# Patient Record
Sex: FEMALE | Race: WHITE | NOT HISPANIC OR LATINO | Employment: UNEMPLOYED | URBAN - METROPOLITAN AREA
[De-identification: names, ages, dates, MRNs, and addresses within clinical notes are randomized per-mention and may not be internally consistent; named-entity substitution may affect disease eponyms.]

---

## 2023-05-11 ENCOUNTER — PATIENT OUTREACH (OUTPATIENT)
Dept: PRIMARY CARE | Facility: CLINIC | Age: 58
End: 2023-05-11

## 2023-05-11 RX ORDER — CIPROFLOXACIN 750 MG/1
TABLET, FILM COATED ORAL 2 TIMES DAILY
COMMUNITY
Start: 2023-05-09 | End: 2023-07-11 | Stop reason: ALTCHOICE

## 2023-05-11 RX ORDER — SULFAMETHOXAZOLE AND TRIMETHOPRIM 800; 160 MG/1; MG/1
TABLET ORAL 2 TIMES DAILY
COMMUNITY
Start: 2023-05-22 | End: 2023-07-11 | Stop reason: ALTCHOICE

## 2023-05-11 RX ORDER — OXYCODONE HYDROCHLORIDE 5 MG/1
TABLET ORAL EVERY 6 HOURS PRN
COMMUNITY
Start: 2023-05-09 | End: 2023-05-25 | Stop reason: ALTCHOICE

## 2023-05-11 RX ORDER — VANCOMYCIN 1 G/200ML
1 INJECTION, SOLUTION INTRAVENOUS EVERY 12 HOURS
COMMUNITY
Start: 2023-05-09 | End: 2023-07-11 | Stop reason: ALTCHOICE

## 2023-05-11 NOTE — PROGRESS NOTES
Discharge Facility: [facility]  Discharge Diagnosis: LLE cellulitise/lymphedema  Admission Date: 5/4/23  Discharge Date:  5/9/23    PCP Appointment Date: TBD  Specialist Appointment Date:   Dr. Joaquín Vazquez - Infectious Disease - 02-Jun-2023 10:00    Hospital Encounter and Summary: Linked   See discharge assessment below for further details    Medications  Medications reviewed with patient/caregiver?: Yes (no new meds) (5/11/2023 10:28 AM)  Is the patient having any side effects they believe may be caused by any medication additions or changes?: No (5/11/2023 10:28 AM)  Does the patient have all medications ordered at discharge?: Yes (5/11/2023 10:28 AM)  Care Management Interventions: No intervention needed (5/11/2023 10:28 AM)  Is the patient taking all medications as directed (includes completed medication regime)?: Yes (5/11/2023 10:28 AM)  Care Management Interventions: Provided patient education (5/11/2023 10:28 AM)    Appointments  Does the patient have a primary care provider?: Yes (5/11/2023 10:28 AM)  Care Management Interventions: Advised patient to make appointment; Educated patient on importance of making appointment (5/11/2023 10:28 AM)  Has the patient kept scheduled appointments due by today?: Yes (5/11/2023 10:28 AM)    Self Management  What is the home health agency?: [facility] (5/11/2023 10:28 AM)  Has home health visited the patient within 72 hours of discharge?: Yes (5/11/2023 10:28 AM)  What Durable Medical Equipment (DME) was ordered?: n/a (5/11/2023 10:28 AM)    Patient Teaching  Does the patient have access to their discharge instructions?: Yes (5/11/2023 10:28 AM)  Care Management Interventions: Reviewed instructions with patient (5/11/2023 10:28 AM)  What is the patient's perception of their health status since discharge?: Improving (5/11/2023 10:28 AM)

## 2023-05-15 LAB
ANION GAP IN SER/PLAS: 14 MMOL/L (ref 10–20)
BASOPHILS (10*3/UL) IN BLOOD BY AUTOMATED COUNT: 0.05 X10E9/L (ref 0–0.1)
BASOPHILS/100 LEUKOCYTES IN BLOOD BY AUTOMATED COUNT: 0.6 % (ref 0–2)
CALCIUM (MG/DL) IN SER/PLAS: 9 MG/DL (ref 8.6–10.6)
CARBON DIOXIDE, TOTAL (MMOL/L) IN SER/PLAS: 27 MMOL/L (ref 21–32)
CHLORIDE (MMOL/L) IN SER/PLAS: 103 MMOL/L (ref 98–107)
CREATININE (MG/DL) IN SER/PLAS: 0.83 MG/DL (ref 0.5–1.05)
EOSINOPHILS (10*3/UL) IN BLOOD BY AUTOMATED COUNT: 0.48 X10E9/L (ref 0–0.7)
EOSINOPHILS/100 LEUKOCYTES IN BLOOD BY AUTOMATED COUNT: 5.6 % (ref 0–6)
ERYTHROCYTE DISTRIBUTION WIDTH (RATIO) BY AUTOMATED COUNT: 17.2 % (ref 11.5–14.5)
ERYTHROCYTE MEAN CORPUSCULAR HEMOGLOBIN CONCENTRATION (G/DL) BY AUTOMATED: 29.1 G/DL (ref 32–36)
ERYTHROCYTE MEAN CORPUSCULAR VOLUME (FL) BY AUTOMATED COUNT: 87 FL (ref 80–100)
ERYTHROCYTES (10*6/UL) IN BLOOD BY AUTOMATED COUNT: 4.2 X10E12/L (ref 4–5.2)
GFR FEMALE: 83 ML/MIN/1.73M2
GLUCOSE (MG/DL) IN SER/PLAS: 97 MG/DL (ref 74–99)
HEMATOCRIT (%) IN BLOOD BY AUTOMATED COUNT: 36.4 % (ref 36–46)
HEMOGLOBIN (G/DL) IN BLOOD: 10.6 G/DL (ref 12–16)
IMMATURE GRANULOCYTES/100 LEUKOCYTES IN BLOOD BY AUTOMATED COUNT: 0.7 % (ref 0–0.9)
LEUKOCYTES (10*3/UL) IN BLOOD BY AUTOMATED COUNT: 8.5 X10E9/L (ref 4.4–11.3)
LYMPHOCYTES (10*3/UL) IN BLOOD BY AUTOMATED COUNT: 1.8 X10E9/L (ref 1.2–4.8)
LYMPHOCYTES/100 LEUKOCYTES IN BLOOD BY AUTOMATED COUNT: 21.1 % (ref 13–44)
MONOCYTES (10*3/UL) IN BLOOD BY AUTOMATED COUNT: 0.62 X10E9/L (ref 0.1–1)
MONOCYTES/100 LEUKOCYTES IN BLOOD BY AUTOMATED COUNT: 7.3 % (ref 2–10)
NEUTROPHILS (10*3/UL) IN BLOOD BY AUTOMATED COUNT: 5.52 X10E9/L (ref 1.2–7.7)
NEUTROPHILS/100 LEUKOCYTES IN BLOOD BY AUTOMATED COUNT: 64.7 % (ref 40–80)
NRBC (PER 100 WBCS) BY AUTOMATED COUNT: 0 /100 WBC (ref 0–0)
PLATELETS (10*3/UL) IN BLOOD AUTOMATED COUNT: 350 X10E9/L (ref 150–450)
POTASSIUM (MMOL/L) IN SER/PLAS: 4.4 MMOL/L (ref 3.5–5.3)
SODIUM (MMOL/L) IN SER/PLAS: 140 MMOL/L (ref 136–145)
UREA NITROGEN (MG/DL) IN SER/PLAS: 10 MG/DL (ref 6–23)
VANCOMYCIN (UG/ML) IN SER/PLAS - TROUGH: 16.5 UG/ML (ref 5–20)

## 2023-05-22 ENCOUNTER — APPOINTMENT (OUTPATIENT)
Dept: PRIMARY CARE | Facility: CLINIC | Age: 58
End: 2023-05-22

## 2023-05-25 ENCOUNTER — OFFICE VISIT (OUTPATIENT)
Dept: PRIMARY CARE | Facility: CLINIC | Age: 58
End: 2023-05-25
Payer: MEDICARE

## 2023-05-25 ENCOUNTER — TELEPHONE (OUTPATIENT)
Dept: PRIMARY CARE | Facility: CLINIC | Age: 58
End: 2023-05-25

## 2023-05-25 VITALS
HEIGHT: 63 IN | BODY MASS INDEX: 51.91 KG/M2 | SYSTOLIC BLOOD PRESSURE: 146 MMHG | WEIGHT: 293 LBS | OXYGEN SATURATION: 96 % | TEMPERATURE: 97.7 F | DIASTOLIC BLOOD PRESSURE: 85 MMHG | HEART RATE: 88 BPM

## 2023-05-25 DIAGNOSIS — G62.9 NEUROPATHY: ICD-10-CM

## 2023-05-25 DIAGNOSIS — F41.9 ANXIETY: ICD-10-CM

## 2023-05-25 DIAGNOSIS — R53.81 MALAISE: ICD-10-CM

## 2023-05-25 DIAGNOSIS — D64.9 ANEMIA, UNSPECIFIED TYPE: Primary | ICD-10-CM

## 2023-05-25 PROCEDURE — 99495 TRANSJ CARE MGMT MOD F2F 14D: CPT | Performed by: FAMILY MEDICINE

## 2023-05-25 RX ORDER — ESCITALOPRAM OXALATE 10 MG/1
10 TABLET ORAL DAILY
Qty: 90 TABLET | Refills: 2 | Status: SHIPPED | OUTPATIENT
Start: 2023-05-25 | End: 2024-06-10

## 2023-05-25 RX ORDER — GABAPENTIN 300 MG/1
300 CAPSULE ORAL 3 TIMES DAILY
Qty: 90 CAPSULE | Refills: 5 | Status: SHIPPED | OUTPATIENT
Start: 2023-05-25 | End: 2024-01-25

## 2023-05-25 RX ORDER — ESCITALOPRAM OXALATE 10 MG/1
TABLET ORAL
COMMUNITY
Start: 2022-05-21 | End: 2023-05-25 | Stop reason: SDUPTHER

## 2023-05-25 RX ORDER — HYDROXYZINE HYDROCHLORIDE 25 MG/1
1 TABLET, FILM COATED ORAL 3 TIMES DAILY PRN
COMMUNITY
Start: 2022-05-21 | End: 2023-05-25 | Stop reason: SDUPTHER

## 2023-05-25 RX ORDER — CALCIUM CARBONATE 500(1250)
TABLET,CHEWABLE ORAL
COMMUNITY
Start: 2023-05-09 | End: 2023-10-09 | Stop reason: ENTERED-IN-ERROR

## 2023-05-25 RX ORDER — HYDROXYZINE HYDROCHLORIDE 25 MG/1
25 TABLET, FILM COATED ORAL 3 TIMES DAILY PRN
Qty: 270 TABLET | Refills: 1 | Status: SHIPPED | OUTPATIENT
Start: 2023-05-25

## 2023-05-25 RX ORDER — CEPHALEXIN 500 MG/1
CAPSULE ORAL EVERY 6 HOURS
COMMUNITY
Start: 2022-07-10 | End: 2023-07-11 | Stop reason: ALTCHOICE

## 2023-05-25 ASSESSMENT — ENCOUNTER SYMPTOMS
ARTHRALGIAS: 1
RESPIRATORY NEGATIVE: 1
FATIGUE: 1
CARDIOVASCULAR NEGATIVE: 1
GASTROINTESTINAL NEGATIVE: 1

## 2023-05-25 ASSESSMENT — PATIENT HEALTH QUESTIONNAIRE - PHQ9
2. FEELING DOWN, DEPRESSED OR HOPELESS: NOT AT ALL
1. LITTLE INTEREST OR PLEASURE IN DOING THINGS: NOT AT ALL
SUM OF ALL RESPONSES TO PHQ9 QUESTIONS 1 AND 2: 0

## 2023-05-25 NOTE — TELEPHONE ENCOUNTER
Rx Refill Request Telephone Encounter    Name:  Patricia Mckeon  :  [id number]  Medication Name:  Lomotil  Dose : 2.5 Mg  Route : Oral  Frequency : 1 per day  Quantity : 10 tablets  Directions : Take one tablet every day  Specific Pharmacy location:  [facility]  Date of last appointment:  2023  Date of next appointment:  N/A    Patient said she talked to Ottoniel about this med. They gave her 10 in the hospital. She was hoping to get some more or a medication like it.

## 2023-05-25 NOTE — PROGRESS NOTES
Subjective   Patient ID: Patricia Mckeon is a 57 y.o. female who presents for No chief complaint on file..  HPI  Sp hosp with cellulitis here for fu visit leg improved no acute dc cont with pain and swelling  Review of Systems   Constitutional:  Positive for fatigue.   HENT: Negative.     Respiratory: Negative.     Cardiovascular: Negative.    Gastrointestinal: Negative.    Genitourinary: Negative.    Musculoskeletal:  Positive for arthralgias and gait problem.       Objective   Physical Exam  Constitutional:       Appearance: Normal appearance.   HENT:      Head: Normocephalic and atraumatic.      Nose: Nose normal.      Mouth/Throat:      Mouth: Mucous membranes are moist.   Eyes:      Extraocular Movements: Extraocular movements intact.      Pupils: Pupils are equal, round, and reactive to light.   Cardiovascular:      Rate and Rhythm: Normal rate and regular rhythm.   Pulmonary:      Effort: Pulmonary effort is normal.      Breath sounds: Normal breath sounds.   Musculoskeletal:         General: Swelling, tenderness and deformity present.      Right lower leg: Edema present.      Left lower leg: Edema present.   Skin:     General: Skin is warm and dry.   Neurological:      Mental Status: She is alert.         Assessment/Plan

## 2023-05-26 ENCOUNTER — PATIENT OUTREACH (OUTPATIENT)
Dept: PRIMARY CARE | Facility: CLINIC | Age: 58
End: 2023-05-26

## 2023-05-26 NOTE — PROGRESS NOTES
Call regarding appt. with PCP on (5/25/23) after hospitalization.  At time of outreach call the patient feels as if their condition has (improved) since last visit.  Reviewed the PCP appointment with the pt and addressed any questions or concerns.  Patient has questions regarding her lasix and if she should continue 20mg daily. Will send message to provider to address.

## 2023-06-12 ENCOUNTER — LAB (OUTPATIENT)
Dept: LAB | Facility: LAB | Age: 58
End: 2023-06-12
Payer: MEDICARE

## 2023-06-12 DIAGNOSIS — R53.81 MALAISE: ICD-10-CM

## 2023-06-12 DIAGNOSIS — G62.9 NEUROPATHY: ICD-10-CM

## 2023-06-12 DIAGNOSIS — D64.9 ANEMIA, UNSPECIFIED TYPE: ICD-10-CM

## 2023-06-12 LAB
COBALAMIN (VITAMIN B12) (PG/ML) IN SER/PLAS: 126 PG/ML (ref 211–911)
ERYTHROCYTE DISTRIBUTION WIDTH (RATIO) BY AUTOMATED COUNT: 16.7 % (ref 11.5–14.5)
ERYTHROCYTE MEAN CORPUSCULAR HEMOGLOBIN CONCENTRATION (G/DL) BY AUTOMATED: 28.8 G/DL (ref 32–36)
ERYTHROCYTE MEAN CORPUSCULAR VOLUME (FL) BY AUTOMATED COUNT: 89 FL (ref 80–100)
ERYTHROCYTES (10*6/UL) IN BLOOD BY AUTOMATED COUNT: 4.55 X10E12/L (ref 4–5.2)
FOLATE (NG/ML) IN SER/PLAS: 4.1 NG/ML
HEMATOCRIT (%) IN BLOOD BY AUTOMATED COUNT: 40.3 % (ref 36–46)
HEMOGLOBIN (G/DL) IN BLOOD: 11.6 G/DL (ref 12–16)
IRON (UG/DL) IN SER/PLAS: 57 UG/DL (ref 35–150)
IRON BINDING CAPACITY (UG/DL) IN SER/PLAS: 335 UG/DL (ref 240–445)
IRON SATURATION (%) IN SER/PLAS: 17 % (ref 25–45)
LEUKOCYTES (10*3/UL) IN BLOOD BY AUTOMATED COUNT: 6.2 X10E9/L (ref 4.4–11.3)
PLATELETS (10*3/UL) IN BLOOD AUTOMATED COUNT: 301 X10E9/L (ref 150–450)
THYROTROPIN (MIU/L) IN SER/PLAS BY DETECTION LIMIT <= 0.05 MIU/L: 2.46 MIU/L (ref 0.44–3.98)

## 2023-06-12 PROCEDURE — 82607 VITAMIN B-12: CPT

## 2023-06-12 PROCEDURE — 83540 ASSAY OF IRON: CPT

## 2023-06-12 PROCEDURE — 82746 ASSAY OF FOLIC ACID SERUM: CPT

## 2023-06-12 PROCEDURE — 84443 ASSAY THYROID STIM HORMONE: CPT

## 2023-06-12 PROCEDURE — 85027 COMPLETE CBC AUTOMATED: CPT

## 2023-06-12 PROCEDURE — 36415 COLL VENOUS BLD VENIPUNCTURE: CPT

## 2023-06-12 PROCEDURE — 83550 IRON BINDING TEST: CPT

## 2023-06-20 ENCOUNTER — TELEPHONE (OUTPATIENT)
Dept: PRIMARY CARE | Facility: CLINIC | Age: 58
End: 2023-06-20

## 2023-06-20 NOTE — TELEPHONE ENCOUNTER
Spoke with pt; relayed message. Pt verbalized understanding. Pt will call back when she can speak to her friend about getting a ride.

## 2023-06-20 NOTE — TELEPHONE ENCOUNTER
----- Message from Ramesh Rothman DO sent at 6/16/2023  1:34 PM EDT -----  Patient's B12 and folate are low needs follow-up visit

## 2023-07-11 ENCOUNTER — OFFICE VISIT (OUTPATIENT)
Dept: PRIMARY CARE | Facility: CLINIC | Age: 58
End: 2023-07-11
Payer: MEDICARE

## 2023-07-11 VITALS
WEIGHT: 293 LBS | OXYGEN SATURATION: 95 % | HEART RATE: 75 BPM | BODY MASS INDEX: 51.91 KG/M2 | DIASTOLIC BLOOD PRESSURE: 83 MMHG | SYSTOLIC BLOOD PRESSURE: 136 MMHG | HEIGHT: 63 IN

## 2023-07-11 DIAGNOSIS — E66.01 CLASS 3 SEVERE OBESITY WITH SERIOUS COMORBIDITY AND BODY MASS INDEX (BMI) OF 60.0 TO 69.9 IN ADULT, UNSPECIFIED OBESITY TYPE (MULTI): ICD-10-CM

## 2023-07-11 DIAGNOSIS — D51.9 ANEMIA DUE TO VITAMIN B12 DEFICIENCY, UNSPECIFIED B12 DEFICIENCY TYPE: Primary | ICD-10-CM

## 2023-07-11 PROBLEM — F41.9 ANXIETY DISORDER, UNSPECIFIED: Status: ACTIVE | Noted: 2019-12-09

## 2023-07-11 PROBLEM — R73.9 HYPERGLYCEMIA: Status: ACTIVE | Noted: 2023-07-11

## 2023-07-11 PROBLEM — D64.9 ANEMIA: Status: ACTIVE | Noted: 2019-12-09

## 2023-07-11 PROBLEM — I89.0 LYMPHEDEMA: Status: ACTIVE | Noted: 2017-03-28

## 2023-07-11 PROBLEM — E66.9 OBESITY, UNSPECIFIED: Status: ACTIVE | Noted: 2019-12-09

## 2023-07-11 PROCEDURE — 3008F BODY MASS INDEX DOCD: CPT | Performed by: FAMILY MEDICINE

## 2023-07-11 PROCEDURE — 99213 OFFICE O/P EST LOW 20 MIN: CPT | Performed by: FAMILY MEDICINE

## 2023-07-11 RX ORDER — FOLIC ACID 1 MG/1
1 TABLET ORAL DAILY
Qty: 30 TABLET | Refills: 6 | Status: SHIPPED | OUTPATIENT
Start: 2023-07-11 | End: 2024-07-10

## 2023-07-11 NOTE — PROGRESS NOTES
Subjective   Patient ID: Patricia Mckeon is a 57 y.o. female.    HPI  Doing well has anemia b12 diff needs wt loss patient with exogenous obesity would like to try medication or possibly surgical intervention.  We will send her over to weight loss clinic and let them start the work-up.    Patient also has B12 deficiency and restart her on give her a shot of B12 I told to get some oral B12 and some oral folate we will recheck her blood work in about 6 to 8 weeks she may need to go on B12 IM injectionsConstitutional:  Positive for fatigue.   HENT: Negative.     Respiratory: Negative.     Cardiovascular: Negative.    Gastrointestinal: Negative.    Genitourinary: Negative.    Musculoskeletal:  Positive for arthralgias and gait problem.       Objective   Physical Exam  Constitutional:       Appearance: Normal appearance.   HENT:      Head: Normocephalic and atraumatic.      Nose: Nose normal.      Mouth/Throat:      Mouth: Mucous membranes are moist.   Eyes:      Extraocular Movements: Extraocular movements intact.      Pupils: Pupils are equal, round, and reactive to light.   Cardiovascular:      Rate and Rhythm: Normal rate and regular rhythm.   Pulmonary:      Effort: Pulmonary effort is normal.      Breath sounds: Normal breath sounds.   Musculoskeletal:         General: Swelling, tenderness and deformity present.      Right lower leg: Edema present.      Left lower leg: Edema present.   Skin:     General: Skin is warm and dry.   Neurological:      Mental Status: She is alert.   Review of Systems    Objective   Physical Exam    Assessment/Plan   Diagnoses and all orders for this visit:  Anemia due to vitamin B12 deficiency, unspecified B12 deficiency type  -     folic acid (Folvite) 1 mg tablet; Take 1 tablet (1 mg) by mouth once daily.  Class 3 severe obesity with serious comorbidity and body mass index (BMI) of 60.0 to 69.9 in adult, unspecified obesity type (CMS/HCC)  -     Referral to Comprehensive Weight Loss;  Future

## 2023-08-04 ENCOUNTER — PATIENT OUTREACH (OUTPATIENT)
Dept: PRIMARY CARE | Facility: CLINIC | Age: 58
End: 2023-08-04
Payer: MEDICAID

## 2023-09-13 PROBLEM — L97.909 VENOUS ULCER (MULTI): Status: ACTIVE | Noted: 2023-05-10

## 2023-09-13 PROBLEM — R26.2 DIFFICULTY IN WALKING, NOT ELSEWHERE CLASSIFIED: Status: ACTIVE | Noted: 2020-03-12

## 2023-09-13 PROBLEM — I83.009 VENOUS ULCER (MULTI): Status: ACTIVE | Noted: 2023-05-10

## 2023-09-13 PROBLEM — L97.221 CHRONIC ULCER OF LEFT CALF LIMITED TO BREAKDOWN OF SKIN (MULTI): Status: ACTIVE | Noted: 2023-09-13

## 2023-09-13 PROBLEM — I87.2 VENOUS INSUFFICIENCY: Status: ACTIVE | Noted: 2017-08-02

## 2023-09-13 PROBLEM — A49.02 MRSA INFECTION: Status: ACTIVE | Noted: 2023-09-13

## 2023-09-13 PROBLEM — S81.802A WOUND OF LEFT LOWER EXTREMITY: Status: ACTIVE | Noted: 2023-09-13

## 2023-09-13 PROBLEM — I89.0 LYMPHEDEMA OF BOTH LOWER EXTREMITIES: Status: ACTIVE | Noted: 2017-08-02

## 2023-10-09 ENCOUNTER — OFFICE VISIT (OUTPATIENT)
Dept: WOUND CARE | Facility: CLINIC | Age: 58
End: 2023-10-09
Payer: MEDICARE

## 2023-10-09 ENCOUNTER — HOME HEALTH ADMISSION (OUTPATIENT)
Dept: HOME HEALTH SERVICES | Facility: HOME HEALTH | Age: 58
End: 2023-10-09
Payer: MEDICARE

## 2023-10-09 VITALS
RESPIRATION RATE: 20 BRPM | DIASTOLIC BLOOD PRESSURE: 83 MMHG | BODY MASS INDEX: 51.91 KG/M2 | HEIGHT: 63 IN | WEIGHT: 293 LBS | SYSTOLIC BLOOD PRESSURE: 140 MMHG | HEART RATE: 83 BPM

## 2023-10-09 DIAGNOSIS — I87.2 VENOUS INSUFFICIENCY: ICD-10-CM

## 2023-10-09 DIAGNOSIS — L97.221 CHRONIC ULCER OF LEFT CALF LIMITED TO BREAKDOWN OF SKIN (MULTI): Primary | ICD-10-CM

## 2023-10-09 DIAGNOSIS — I89.0 LYMPHEDEMA: ICD-10-CM

## 2023-10-09 PROCEDURE — 1036F TOBACCO NON-USER: CPT | Performed by: INTERNAL MEDICINE

## 2023-10-09 PROCEDURE — 11042 DBRDMT SUBQ TIS 1ST 20SQCM/<: CPT | Performed by: INTERNAL MEDICINE

## 2023-10-09 PROCEDURE — 3008F BODY MASS INDEX DOCD: CPT | Performed by: INTERNAL MEDICINE

## 2023-10-09 ASSESSMENT — COLUMBIA-SUICIDE SEVERITY RATING SCALE - C-SSRS
1. IN THE PAST MONTH, HAVE YOU WISHED YOU WERE DEAD OR WISHED YOU COULD GO TO SLEEP AND NOT WAKE UP?: NO
2. HAVE YOU ACTUALLY HAD ANY THOUGHTS OF KILLING YOURSELF?: NO
6. HAVE YOU EVER DONE ANYTHING, STARTED TO DO ANYTHING, OR PREPARED TO DO ANYTHING TO END YOUR LIFE?: NO

## 2023-10-09 ASSESSMENT — PAIN SCALES - GENERAL: PAINLEVEL: 8

## 2023-10-09 ASSESSMENT — PATIENT HEALTH QUESTIONNAIRE - PHQ9
1. LITTLE INTEREST OR PLEASURE IN DOING THINGS: NOT AT ALL
2. FEELING DOWN, DEPRESSED OR HOPELESS: NOT AT ALL
SUM OF ALL RESPONSES TO PHQ9 QUESTIONS 1 AND 2: 0

## 2023-10-09 NOTE — PATIENT INSTRUCTIONS
PROVIDER IMPRESSIONS:  Post calf very fibrous. Change dressing to triple helix powder to the base. OK for foam to cover. Continue edema wear/tubigrip right for compression. Left tubigrip and edemawear distal - edemawear proximal. Change dressing 3 x a week - [facility] to conitnue.     VISIT ORDERS  Vascular Study Required: n  Labs Required: n  Radiology Imaging Required: n  Consultation Required: n  Rx Provided:   Changes to Plan of Care : y      NEW ORDERS:  Wash: fredy wash  Irrigate/Soak:   Skin Care: eucerin  Dressing:  triple helix powder and foam  Compression: right - edema wear/tubigrip - circaid to be ordered; left edema wear and tubigrip  Offloading:     DISCHARGE PLANNING:    Follow Up: 4 weeks  Nurse Visit: prn

## 2023-10-09 NOTE — PROGRESS NOTES
"ARRIVAL:  ambulating with walker  TRANSFER:  1 moderate assist    REFERRAL REQUESTED BY: Dr. Ramesh Rothman  LAST SEEN: 9/11/23    Patient ID: Patricia Mckeon is a 58 y.o. female     HPI  Here for follow up LLE lymphedema and ulcers. Reports this is doing OK. States she has new insurance and can get rides to lymphedema therapy.     CURRENT DRESSING:  Who is performing the dressing change:  home care   Dressing applied:  LLE ABD pad and tubigrip (patient took a shower just had ABD on today)  Dressing Frequency:  3 times a week     EDEMA MANAGEMENT  Compression:  Right: Double Tubigrip CALF 65.0cm   Left: Tubigrip   CALF 91.5cm      Other Modality  Sleeping in Bed:  yes  Elevating FOB:  reports elevating foot of the bed    Offloading/Pressure Relief  Activity Level:  ad bradly   Protection Devices: offloads pressure from wound on left lateral calf ulcer    Offloading/Pressure Relief Effective?     ROS  Feeling OK  No fevers or chills    Objective     Vitals    /83 (BP Location: Right arm, Patient Position: Sitting)   Pulse 83   Resp 20   Ht 1.6 m (5' 3\")   Wt (!) 171 kg (376 lb 3.2 oz)   BMI 66.64 kg/m²       Physical Exam  Skin :  Skin graft lower leg. Associated erythema    PULSES:     EXTREMITY TEMPERATURE:    RIGHT: normal   LEFT: normal     EDEMA:  3+ severe left  Moderate 2+ right    WOUND ASSESS/CARE  Wound 05/23/22 #1 LEFT LATERAL CALF ULCER   Date First Assessed: 05/23/22   Present on Original Admission: Yes  Primary Wound Type: (c) Other (comment)  Location: Pretibial  Wound Location Orientation: (c) Left;Lateral      Assessments 9/13/2023  3:20 PM 10/9/2023 10:45 AM   Site Assessment Fibrinous Granulation;Pink;Red   Cindi-Wound Assessment Intact;Erythematous Intact   Non-staged Wound Description Full thickness Full thickness   Wound Length (cm) 2.1 cm 2.5 cm   Wound Width (cm) 4.2 cm 4.3 cm   Wound Surface Area (cm^2) 8.82 cm^2 10.75 cm^2   Wound Depth (cm) 0.4 cm 0.2 cm   Wound Volume (cm^3) 3.528 " cm^3 2.15 cm^3   Wound Healing % -- 39   Wound Bed Granulation (%) -- 10 %   Drainage Description Serosanguineous Serosanguineous   Drainage Amount Small Moderate       No associated orders.       Wound 05/23/22 #8 LEFT ANTERIOR LATERAL CALF ULCER   Date First Assessed: 05/23/22   Present on Original Admission: Yes  Primary Wound Type: (c) Traumatic  Location: (c) Pretibial  Wound Location Orientation: Left;Anterior;Lateral      Assessments 9/13/2023  3:23 PM 10/9/2023 10:47 AM   Site Assessment Granulation Granulation;Pink;Red   Cindi-Wound Assessment Erythematous Intact   Non-staged Wound Description Full thickness Full thickness   Wound Length (cm) -- 7.8 cm   Wound Width (cm) -- 0.6 cm   Wound Surface Area (cm^2) -- 4.68 cm^2   Wound Depth (cm) -- 0.1 cm   Wound Volume (cm^3) -- 0.468 cm^3   Wound Healing % -- -54   Wound Bed Granulation (%) -- 50 %   Drainage Description -- Serosanguineous   Drainage Amount -- Moderate   Dressing Status -- Removed       No associated orders.       Wound 03/27/23 #6 LEFT ANTERIOR ANKLE WOUND   Date First Assessed: 03/27/23   Present on Original Admission: No  Primary Wound Type: Traumatic  Location: Ankle  Wound Location Orientation: Left;Anterior  Wound Outcome: Healed      Assessments 9/13/2023  3:29 PM   Site Assessment Dry;Fibrinous   Cindi-Wound Assessment Intact   Non-staged Wound Description Full thickness   Wound Length (cm) 1.4 cm   Wound Width (cm) 1.6 cm   Wound Surface Area (cm^2) 2.24 cm^2   Wound Depth (cm) 0.1 cm   Wound Volume (cm^3) 0.224 cm^3   Drainage Description Serosanguineous   Drainage Amount Small                                                                                                    10/09/2023 Healed   No associated orders.             Debridement   Wound 05/23/22 Other (comment) Pretibial Left;Lateral    Consent obtained? verbal  Performed by: physician  Debridement type: surgical  Level of debridement: subcutaneous tissue  Pain control:  lidocaine 2%  Post-debridement measurements  Length (cm): 2.5  Width (cm): 4.3  Depth (cm): 0.3  Percent debrided: 100%  Surface Area (cm^2): 10.75  Area debrided (cm^2): 10.75  Volume (cm^3): 3.23  Tissue and other material debrided: subcutaneous tissue  Devitalized tissue debrided: fibrin  Instrument(s) utilized: blade, curette and nippers  Bleeding: small  Hemostasis obtained with: not applicable  Response to treatment: procedure was tolerated well    Debridement   Wound 05/23/22 Traumatic Pretibial Left;Anterior;Lateral    Consent obtained? verbal  Performed by: physician  Debridement type: surgical  Level of debridement: subcutaneous tissue  Pain control: lidocaine 2%  Post-debridement measurements  Length (cm): 7.8  Width (cm): 0.6  Depth (cm): 0.2  Percent debrided: 100%  Surface Area (cm^2): 4.68  Area debrided (cm^2): 4.68  Volume (cm^3): 0.94  Tissue and other material debrided: subcutaneous tissue  Devitalized tissue debrided: biofilm, fibrin and slough  Instrument(s) utilized: curette  Bleeding: small  Hemostasis obtained with: not applicable  Response to treatment: procedure was tolerated well        Assessment/Plan     PROVIDER IMPRESSIONS:  Post calf very fibrous. Change dressing to triple helix powder to the base. OK for foam to cover. Continue edema wear/tubigrip right for compression. Left tubigrip and edemawear distal - edemawear proximal. [facility]     VISIT ORDERS  Vascular Study Required: n  Labs Required: n  Radiology Imaging Required: n  Consultation Required: n  Rx Provided:   Changes to Plan of Care : y      NEW ORDERS:  Wash: fredy wash  Irrigate/Soak:   Skin Care: eucerin  Dressing:  triple helix powder and foam  Compression: right - edema wear/tubigrip - circaid to be ordered; left edema wear and tubigrip  Offloading:     DISCHARGE PLANNING:    Follow Up: 4 weeks  Nurse Visit: prn      General Instructions:  Center RN to apply EMLA/Lidocaine 1%  jelly/LMX  topically to wound(s) prior to debridement by physician.  Center RN my see patient as a nurse consult in my absence.      RN Post Procedure Note:      RLE Aquaphor and double tubigrip.   LLE:  to wound base sprinkled triple help with mepilex.  Proximal lower leg with medium edemawear, distal lower leg with tubigrip.  Circaid ordered for RLE, revision sent.  KUNAL East RN    HCC/ECF/Assisted Living  Agency/Facility: [facility]   days/week 3XWK  Contacted Regarding Changes: YES

## 2023-10-13 ENCOUNTER — HOME CARE VISIT (OUTPATIENT)
Dept: HOME HEALTH SERVICES | Facility: HOME HEALTH | Age: 58
End: 2023-10-13
Payer: MEDICARE

## 2023-10-13 VITALS
OXYGEN SATURATION: 97 % | DIASTOLIC BLOOD PRESSURE: 78 MMHG | HEART RATE: 93 BPM | SYSTOLIC BLOOD PRESSURE: 138 MMHG | TEMPERATURE: 98.6 F | RESPIRATION RATE: 16 BRPM

## 2023-10-13 PROCEDURE — 1090000002 HH PPS REVENUE DEBIT

## 2023-10-13 PROCEDURE — 0023 HH SOC

## 2023-10-13 PROCEDURE — G0299 HHS/HOSPICE OF RN EA 15 MIN: HCPCS | Mod: HHH

## 2023-10-13 PROCEDURE — 1090000001 HH PPS REVENUE CREDIT

## 2023-10-13 PROCEDURE — 169592 NO-PAY CLAIM PROCEDURE

## 2023-10-13 ASSESSMENT — PAIN SCALES - PAIN ASSESSMENT IN ADVANCED DEMENTIA (PAINAD)
BREATHING: 0
FACIALEXPRESSION: 0 - SMILING OR INEXPRESSIVE.
CONSOLABILITY: 0
NEGVOCALIZATION: 0 - NONE.
CONSOLABILITY: 0 - NO NEED TO CONSOLE.
BODYLANGUAGE: 1
NEGVOCALIZATION: 0
BODYLANGUAGE: 1 - TENSE. DISTRESSED PACING. FIDGETING.
FACIALEXPRESSION: 0
TOTALSCORE: 1

## 2023-10-13 ASSESSMENT — ENCOUNTER SYMPTOMS
LIMITED RANGE OF MOTION: 1
PAIN: 1
LOSS OF SENSATION IN FEET: 0
DEPRESSION: 1
HIGHEST PAIN SEVERITY IN PAST 24 HOURS: 5/10
PAIN SEVERITY GOAL: 4/10
FREQUENCY: 1
OCCASIONAL FEELINGS OF UNSTEADINESS: 1
SUBJECTIVE PAIN PROGRESSION: UNCHANGED
PERSON REPORTING PAIN: PATIENT
LOWEST PAIN SEVERITY IN PAST 24 HOURS: 4/10
APPETITE LEVEL: GOOD

## 2023-10-13 ASSESSMENT — ACTIVITIES OF DAILY LIVING (ADL)
AMBULATION ASSISTANCE: STAND BY ASSIST
AMBULATION ASSISTANCE: 1
TRANSPORTATION: NEEDS ASSISTANCE
TRANSPORTATION ASSESSED: 1
ENTERING_EXITING_HOME: SUPERVISION
DRESSING_LB_CURRENT_FUNCTION: CONTACT GUARD ASSIST

## 2023-10-13 ASSESSMENT — PAIN DESCRIPTION - PAIN TYPE: TYPE: CHRONIC PAIN;NEUROPATHIC PAIN

## 2023-10-14 PROCEDURE — 1090000002 HH PPS REVENUE DEBIT

## 2023-10-14 PROCEDURE — 1090000001 HH PPS REVENUE CREDIT

## 2023-10-15 PROCEDURE — 1090000002 HH PPS REVENUE DEBIT

## 2023-10-15 PROCEDURE — 1090000001 HH PPS REVENUE CREDIT

## 2023-10-16 ENCOUNTER — APPOINTMENT (OUTPATIENT)
Dept: HOME HEALTH SERVICES | Facility: HOME HEALTH | Age: 58
End: 2023-10-16
Payer: MEDICARE

## 2023-10-16 PROCEDURE — 1090000002 HH PPS REVENUE DEBIT

## 2023-10-16 PROCEDURE — 1090000001 HH PPS REVENUE CREDIT

## 2023-10-17 PROCEDURE — 1090000002 HH PPS REVENUE DEBIT

## 2023-10-17 PROCEDURE — 1090000001 HH PPS REVENUE CREDIT

## 2023-10-18 ENCOUNTER — HOME CARE VISIT (OUTPATIENT)
Dept: HOME HEALTH SERVICES | Facility: HOME HEALTH | Age: 58
End: 2023-10-18
Payer: MEDICARE

## 2023-10-18 PROCEDURE — G0300 HHS/HOSPICE OF LPN EA 15 MIN: HCPCS | Mod: HHH

## 2023-10-18 PROCEDURE — 1090000002 HH PPS REVENUE DEBIT

## 2023-10-18 PROCEDURE — 1090000001 HH PPS REVENUE CREDIT

## 2023-10-19 VITALS — SYSTOLIC BLOOD PRESSURE: 110 MMHG | DIASTOLIC BLOOD PRESSURE: 72 MMHG | OXYGEN SATURATION: 100 % | HEART RATE: 76 BPM

## 2023-10-19 PROCEDURE — 1090000001 HH PPS REVENUE CREDIT

## 2023-10-19 PROCEDURE — 1090000002 HH PPS REVENUE DEBIT

## 2023-10-19 SDOH — ECONOMIC STABILITY: FOOD INSECURITY: MEALS PER DAY: 3

## 2023-10-19 ASSESSMENT — ENCOUNTER SYMPTOMS
LOWEST PAIN SEVERITY IN PAST 24 HOURS: 0/10
SUBJECTIVE PAIN PROGRESSION: UNCHANGED
MUSCLE WEAKNESS: 1
CHANGE IN APPETITE: UNCHANGED
PAIN SEVERITY GOAL: 0/10
HIGHEST PAIN SEVERITY IN PAST 24 HOURS: 7/10
PAIN LOCATION: LEFT LEG
PAIN: 1
APPETITE LEVEL: GOOD
LOWER EXTREMITY EDEMA: 1
LIMITED RANGE OF MOTION: 1

## 2023-10-19 NOTE — HOME HEALTH
Patient alert oriented x3,able to verbalize needs.Patient homebound due to taxing effort to leave home,patient is grossly obese,ambulates with walker,slow steady gait.Patient lives with hr son who is her caregiver.Lung sounds cta,no wheezing noted.vitals wnl this visit..Wound care per poc tolerated well,will continue to assess.

## 2023-10-20 ENCOUNTER — HOME CARE VISIT (OUTPATIENT)
Dept: HOME HEALTH SERVICES | Facility: HOME HEALTH | Age: 58
End: 2023-10-20
Payer: MEDICARE

## 2023-10-20 PROCEDURE — 1090000001 HH PPS REVENUE CREDIT

## 2023-10-20 PROCEDURE — 1090000002 HH PPS REVENUE DEBIT

## 2023-10-20 PROCEDURE — G0299 HHS/HOSPICE OF RN EA 15 MIN: HCPCS | Mod: HHH

## 2023-10-21 PROCEDURE — 1090000001 HH PPS REVENUE CREDIT

## 2023-10-21 PROCEDURE — 1090000002 HH PPS REVENUE DEBIT

## 2023-10-22 PROCEDURE — 1090000002 HH PPS REVENUE DEBIT

## 2023-10-22 PROCEDURE — 1090000001 HH PPS REVENUE CREDIT

## 2023-10-22 ASSESSMENT — ACTIVITIES OF DAILY LIVING (ADL)
AMBULATION ASSISTANCE: STAND BY ASSIST
MONEY MANAGEMENT (EXPENSES/BILLS): INDEPENDENT
AMBULATION ASSISTANCE: 1

## 2023-10-22 ASSESSMENT — PAIN SCALES - PAIN ASSESSMENT IN ADVANCED DEMENTIA (PAINAD)
BREATHING: 0
CONSOLABILITY: 0
FACIALEXPRESSION: 0 - SMILING OR INEXPRESSIVE.
TOTALSCORE: 0
BODYLANGUAGE: 0 - RELAXED.
NEGVOCALIZATION: 0
BODYLANGUAGE: 0
FACIALEXPRESSION: 0
CONSOLABILITY: 0 - NO NEED TO CONSOLE.
NEGVOCALIZATION: 0 - NONE.

## 2023-10-22 ASSESSMENT — ENCOUNTER SYMPTOMS
SUBJECTIVE PAIN PROGRESSION: UNCHANGED
HIGHEST PAIN SEVERITY IN PAST 24 HOURS: 0/10
CHANGE IN APPETITE: UNCHANGED
PAIN SEVERITY GOAL: 0/10
DEPRESSION: 0
DENIES PAIN: 1
LOSS OF SENSATION IN FEET: 1
PERSON REPORTING PAIN: PATIENT
LOWEST PAIN SEVERITY IN PAST 24 HOURS: 0/10
MUSCLE WEAKNESS: 1
APPETITE LEVEL: GOOD
OCCASIONAL FEELINGS OF UNSTEADINESS: 1

## 2023-10-23 ENCOUNTER — HOME CARE VISIT (OUTPATIENT)
Dept: HOME HEALTH SERVICES | Facility: HOME HEALTH | Age: 58
End: 2023-10-23
Payer: MEDICARE

## 2023-10-23 VITALS — HEART RATE: 84 BPM | TEMPERATURE: 97.1 F | DIASTOLIC BLOOD PRESSURE: 70 MMHG | SYSTOLIC BLOOD PRESSURE: 135 MMHG

## 2023-10-23 PROCEDURE — G0300 HHS/HOSPICE OF LPN EA 15 MIN: HCPCS | Mod: HHH

## 2023-10-23 PROCEDURE — 1090000002 HH PPS REVENUE DEBIT

## 2023-10-23 PROCEDURE — 1090000001 HH PPS REVENUE CREDIT

## 2023-10-23 ASSESSMENT — PAIN SCALES - PAIN ASSESSMENT IN ADVANCED DEMENTIA (PAINAD)
FACIALEXPRESSION: 0 - SMILING OR INEXPRESSIVE.
BREATHING: 0
NEGVOCALIZATION: 0
NEGVOCALIZATION: 0 - NONE.
BODYLANGUAGE: 0
FACIALEXPRESSION: 0
BODYLANGUAGE: 0 - RELAXED.

## 2023-10-23 ASSESSMENT — ENCOUNTER SYMPTOMS
PAIN: 1
APPETITE LEVEL: GOOD
PERSON REPORTING PAIN: PATIENT
PAIN LOCATION: LEFT LEG
OCCASIONAL FEELINGS OF UNSTEADINESS: 0
LAST BOWEL MOVEMENT: 66770
SUBJECTIVE PAIN PROGRESSION: UNCHANGED
HIGHEST PAIN SEVERITY IN PAST 24 HOURS: 7/10

## 2023-10-23 ASSESSMENT — ACTIVITIES OF DAILY LIVING (ADL): MONEY MANAGEMENT (EXPENSES/BILLS): INDEPENDENT

## 2023-10-24 PROCEDURE — 1090000001 HH PPS REVENUE CREDIT

## 2023-10-24 PROCEDURE — 1090000002 HH PPS REVENUE DEBIT

## 2023-10-25 ENCOUNTER — HOME CARE VISIT (OUTPATIENT)
Dept: HOME HEALTH SERVICES | Facility: HOME HEALTH | Age: 58
End: 2023-10-25
Payer: MEDICARE

## 2023-10-25 PROCEDURE — 1090000001 HH PPS REVENUE CREDIT

## 2023-10-25 PROCEDURE — G0300 HHS/HOSPICE OF LPN EA 15 MIN: HCPCS | Mod: HHH

## 2023-10-25 PROCEDURE — 1090000002 HH PPS REVENUE DEBIT

## 2023-10-25 ASSESSMENT — ACTIVITIES OF DAILY LIVING (ADL): OASIS_M1830: 03

## 2023-10-26 PROCEDURE — 1090000001 HH PPS REVENUE CREDIT

## 2023-10-26 PROCEDURE — 1090000002 HH PPS REVENUE DEBIT

## 2023-10-26 ASSESSMENT — ENCOUNTER SYMPTOMS
LOSS OF SENSATION IN FEET: 0
OCCASIONAL FEELINGS OF UNSTEADINESS: 0
DEPRESSION: 0
APPETITE LEVEL: GOOD
DENIES PAIN: 1

## 2023-10-26 ASSESSMENT — PAIN SCALES - PAIN ASSESSMENT IN ADVANCED DEMENTIA (PAINAD)
BODYLANGUAGE: 0 - RELAXED.
FACIALEXPRESSION: 0 - SMILING OR INEXPRESSIVE.
BODYLANGUAGE: 0
NEGVOCALIZATION: 0 - NONE.
FACIALEXPRESSION: 0
NEGVOCALIZATION: 0
BREATHING: 0

## 2023-10-26 ASSESSMENT — ACTIVITIES OF DAILY LIVING (ADL): MONEY MANAGEMENT (EXPENSES/BILLS): INDEPENDENT

## 2023-10-27 ENCOUNTER — HOME CARE VISIT (OUTPATIENT)
Dept: HOME HEALTH SERVICES | Facility: HOME HEALTH | Age: 58
End: 2023-10-27
Payer: MEDICARE

## 2023-10-27 VITALS
HEART RATE: 98 BPM | RESPIRATION RATE: 18 BRPM | TEMPERATURE: 97.8 F | OXYGEN SATURATION: 98 % | SYSTOLIC BLOOD PRESSURE: 140 MMHG | DIASTOLIC BLOOD PRESSURE: 80 MMHG

## 2023-10-27 PROCEDURE — 1090000002 HH PPS REVENUE DEBIT

## 2023-10-27 PROCEDURE — 1090000001 HH PPS REVENUE CREDIT

## 2023-10-27 PROCEDURE — G0180 MD CERTIFICATION HHA PATIENT: HCPCS | Performed by: INTERNAL MEDICINE

## 2023-10-27 PROCEDURE — G0299 HHS/HOSPICE OF RN EA 15 MIN: HCPCS | Mod: HHH

## 2023-10-27 ASSESSMENT — ENCOUNTER SYMPTOMS
SUBJECTIVE PAIN PROGRESSION: UNCHANGED
PAIN: 1
APPETITE LEVEL: GOOD
LOWEST PAIN SEVERITY IN PAST 24 HOURS: 6/10
PERSON REPORTING PAIN: PATIENT

## 2023-10-27 NOTE — HOME HEALTH
RN home visit for wound care. VSS. pain at baseline moderate 6/10. Wound care provided per orders. pictures and measurements uploaded in [employer]no need for supplies at this time. No falls reported. TCNR OS IN OFFICE TODAY SECONDARY TO BLOCKED XEN. PT ON BLOOD THINNERS SOME BLEEDING OCCURRING AFTER PROCEDURE. 9 LAYERED HYPHEMA. PT AWARE THAT VISION MAY BECOME MORE BLURRED DUE TO THIS. NO EYE RUBBING AND METAL SHIELD WHILE SLEEPING. REVIEWED POST OP DROPS.

## 2023-10-28 PROCEDURE — 1090000001 HH PPS REVENUE CREDIT

## 2023-10-28 PROCEDURE — 1090000002 HH PPS REVENUE DEBIT

## 2023-10-29 PROCEDURE — 1090000002 HH PPS REVENUE DEBIT

## 2023-10-29 PROCEDURE — 1090000001 HH PPS REVENUE CREDIT

## 2023-10-30 ENCOUNTER — HOME CARE VISIT (OUTPATIENT)
Dept: HOME HEALTH SERVICES | Facility: HOME HEALTH | Age: 58
End: 2023-10-30
Payer: MEDICARE

## 2023-10-30 VITALS
HEART RATE: 82 BPM | SYSTOLIC BLOOD PRESSURE: 130 MMHG | TEMPERATURE: 97.1 F | RESPIRATION RATE: 16 BRPM | DIASTOLIC BLOOD PRESSURE: 70 MMHG

## 2023-10-30 PROCEDURE — G0300 HHS/HOSPICE OF LPN EA 15 MIN: HCPCS | Mod: HHH

## 2023-10-30 PROCEDURE — 1090000002 HH PPS REVENUE DEBIT

## 2023-10-30 PROCEDURE — 1090000001 HH PPS REVENUE CREDIT

## 2023-10-31 PROCEDURE — 1090000001 HH PPS REVENUE CREDIT

## 2023-10-31 PROCEDURE — 1090000002 HH PPS REVENUE DEBIT

## 2023-10-31 ASSESSMENT — PAIN SCALES - PAIN ASSESSMENT IN ADVANCED DEMENTIA (PAINAD)
BREATHING: 0
BODYLANGUAGE: 0
FACIALEXPRESSION: 0 - SMILING OR INEXPRESSIVE.
BODYLANGUAGE: 0 - RELAXED.
FACIALEXPRESSION: 0

## 2023-10-31 ASSESSMENT — ACTIVITIES OF DAILY LIVING (ADL): MONEY MANAGEMENT (EXPENSES/BILLS): INDEPENDENT

## 2023-10-31 ASSESSMENT — ENCOUNTER SYMPTOMS
APPETITE LEVEL: GOOD
DENIES PAIN: 1
OCCASIONAL FEELINGS OF UNSTEADINESS: 0
DEPRESSION: 0
LAST BOWEL MOVEMENT: 66777
CHANGE IN APPETITE: UNCHANGED
LOSS OF SENSATION IN FEET: 0

## 2023-10-31 NOTE — HOME HEALTH
Alert/orientx4, VS WNL. Wound care completed per wound orders. patient denies pain or falls at this time. supplies ordered.

## 2023-11-01 ENCOUNTER — HOME CARE VISIT (OUTPATIENT)
Dept: HOME HEALTH SERVICES | Facility: HOME HEALTH | Age: 58
End: 2023-11-01
Payer: MEDICARE

## 2023-11-01 VITALS
RESPIRATION RATE: 16 BRPM | HEART RATE: 100 BPM | TEMPERATURE: 97.2 F | DIASTOLIC BLOOD PRESSURE: 70 MMHG | SYSTOLIC BLOOD PRESSURE: 130 MMHG

## 2023-11-01 PROCEDURE — 1090000001 HH PPS REVENUE CREDIT

## 2023-11-01 PROCEDURE — 1090000002 HH PPS REVENUE DEBIT

## 2023-11-01 PROCEDURE — G0300 HHS/HOSPICE OF LPN EA 15 MIN: HCPCS | Mod: HHH

## 2023-11-01 ASSESSMENT — PAIN SCALES - PAIN ASSESSMENT IN ADVANCED DEMENTIA (PAINAD)
BREATHING: 0
FACIALEXPRESSION: 0 - SMILING OR INEXPRESSIVE.
NEGVOCALIZATION: 0 - NONE.
BODYLANGUAGE: 0
FACIALEXPRESSION: 0
BODYLANGUAGE: 0 - RELAXED.
NEGVOCALIZATION: 0

## 2023-11-01 ASSESSMENT — ENCOUNTER SYMPTOMS
OCCASIONAL FEELINGS OF UNSTEADINESS: 0
APPETITE LEVEL: GOOD
LOSS OF SENSATION IN FEET: 0
DEPRESSION: 0
DENIES PAIN: 1
LAST BOWEL MOVEMENT: 66779

## 2023-11-01 ASSESSMENT — ACTIVITIES OF DAILY LIVING (ADL): MONEY MANAGEMENT (EXPENSES/BILLS): INDEPENDENT

## 2023-11-02 PROCEDURE — 1090000002 HH PPS REVENUE DEBIT

## 2023-11-02 PROCEDURE — 1090000001 HH PPS REVENUE CREDIT

## 2023-11-03 ENCOUNTER — HOME CARE VISIT (OUTPATIENT)
Dept: HOME HEALTH SERVICES | Facility: HOME HEALTH | Age: 58
End: 2023-11-03
Payer: MEDICARE

## 2023-11-03 VITALS
DIASTOLIC BLOOD PRESSURE: 70 MMHG | RESPIRATION RATE: 16 BRPM | HEART RATE: 82 BPM | SYSTOLIC BLOOD PRESSURE: 130 MMHG | TEMPERATURE: 97.9 F

## 2023-11-03 PROCEDURE — 1090000001 HH PPS REVENUE CREDIT

## 2023-11-03 PROCEDURE — 1090000002 HH PPS REVENUE DEBIT

## 2023-11-03 PROCEDURE — G0300 HHS/HOSPICE OF LPN EA 15 MIN: HCPCS | Mod: HHH

## 2023-11-04 PROCEDURE — 1090000002 HH PPS REVENUE DEBIT

## 2023-11-04 PROCEDURE — 1090000001 HH PPS REVENUE CREDIT

## 2023-11-04 ASSESSMENT — PAIN SCALES - PAIN ASSESSMENT IN ADVANCED DEMENTIA (PAINAD)
FACIALEXPRESSION: 0
BODYLANGUAGE: 0
BODYLANGUAGE: 0 - RELAXED.
NEGVOCALIZATION: 0 - NONE.
NEGVOCALIZATION: 0
FACIALEXPRESSION: 0 - SMILING OR INEXPRESSIVE.

## 2023-11-04 ASSESSMENT — ENCOUNTER SYMPTOMS
DENIES PAIN: 1
APPETITE LEVEL: GOOD
LOSS OF SENSATION IN FEET: 0
OCCASIONAL FEELINGS OF UNSTEADINESS: 0
DEPRESSION: 0
CHANGE IN APPETITE: UNCHANGED
LAST BOWEL MOVEMENT: 66781

## 2023-11-05 PROCEDURE — 1090000001 HH PPS REVENUE CREDIT

## 2023-11-05 PROCEDURE — 1090000002 HH PPS REVENUE DEBIT

## 2023-11-06 ENCOUNTER — HOME CARE VISIT (OUTPATIENT)
Dept: HOME HEALTH SERVICES | Facility: HOME HEALTH | Age: 58
End: 2023-11-06
Payer: MEDICARE

## 2023-11-06 VITALS
HEART RATE: 100 BPM | SYSTOLIC BLOOD PRESSURE: 130 MMHG | RESPIRATION RATE: 16 BRPM | DIASTOLIC BLOOD PRESSURE: 70 MMHG | TEMPERATURE: 97.5 F

## 2023-11-06 PROCEDURE — G0300 HHS/HOSPICE OF LPN EA 15 MIN: HCPCS | Mod: HHH

## 2023-11-06 PROCEDURE — 1090000002 HH PPS REVENUE DEBIT

## 2023-11-06 PROCEDURE — 1090000001 HH PPS REVENUE CREDIT

## 2023-11-07 PROCEDURE — 1090000002 HH PPS REVENUE DEBIT

## 2023-11-07 PROCEDURE — 1090000001 HH PPS REVENUE CREDIT

## 2023-11-08 ENCOUNTER — HOME CARE VISIT (OUTPATIENT)
Dept: HOME HEALTH SERVICES | Facility: HOME HEALTH | Age: 58
End: 2023-11-08
Payer: MEDICARE

## 2023-11-08 VITALS
SYSTOLIC BLOOD PRESSURE: 130 MMHG | TEMPERATURE: 97.7 F | RESPIRATION RATE: 16 BRPM | DIASTOLIC BLOOD PRESSURE: 70 MMHG | HEART RATE: 100 BPM

## 2023-11-08 PROCEDURE — 1090000002 HH PPS REVENUE DEBIT

## 2023-11-08 PROCEDURE — G0300 HHS/HOSPICE OF LPN EA 15 MIN: HCPCS | Mod: HHH

## 2023-11-08 PROCEDURE — 1090000001 HH PPS REVENUE CREDIT

## 2023-11-09 PROCEDURE — 1090000002 HH PPS REVENUE DEBIT

## 2023-11-09 PROCEDURE — 1090000001 HH PPS REVENUE CREDIT

## 2023-11-10 ENCOUNTER — HOME CARE VISIT (OUTPATIENT)
Dept: HOME HEALTH SERVICES | Facility: HOME HEALTH | Age: 58
End: 2023-11-10
Payer: MEDICARE

## 2023-11-10 VITALS
TEMPERATURE: 98.1 F | RESPIRATION RATE: 18 BRPM | HEART RATE: 98 BPM | DIASTOLIC BLOOD PRESSURE: 70 MMHG | OXYGEN SATURATION: 98 % | SYSTOLIC BLOOD PRESSURE: 130 MMHG

## 2023-11-10 PROCEDURE — 1090000001 HH PPS REVENUE CREDIT

## 2023-11-10 PROCEDURE — G0299 HHS/HOSPICE OF RN EA 15 MIN: HCPCS | Mod: HHH

## 2023-11-10 PROCEDURE — 1090000002 HH PPS REVENUE DEBIT

## 2023-11-10 ASSESSMENT — ENCOUNTER SYMPTOMS
SUBJECTIVE PAIN PROGRESSION: UNCHANGED
LOWEST PAIN SEVERITY IN PAST 24 HOURS: 4/10
PAIN: 1
CHANGE IN APPETITE: UNCHANGED
APPETITE LEVEL: GOOD
HIGHEST PAIN SEVERITY IN PAST 24 HOURS: 7/10
LAST BOWEL MOVEMENT: 66788
PAIN LOCATION - PAIN SEVERITY: 6/10
PERSON REPORTING PAIN: PATIENT

## 2023-11-10 NOTE — HOME HEALTH
RN home visit for wound care. VSS. pain at baseline moderate 6/10. Wound care provided per orders. pictures and measurements uploaded in [employer].no need for supplies at this time. No falls reported.

## 2023-11-10 NOTE — Clinical Note
Hue. I saw Ms Mckeon today in her home for wound care. The periarea was slightly more red then the last time I saw her and patient reported increased drainage. No other signs of infection. Patient is due to be seen in MD Martin office next Friday.  Melania, I see you are assigned her visits for Moday and Wed next week. Please let us know if there are any s.s of infection.

## 2023-11-11 PROCEDURE — 1090000002 HH PPS REVENUE DEBIT

## 2023-11-11 PROCEDURE — 1090000001 HH PPS REVENUE CREDIT

## 2023-11-12 PROCEDURE — 1090000001 HH PPS REVENUE CREDIT

## 2023-11-12 PROCEDURE — 1090000002 HH PPS REVENUE DEBIT

## 2023-11-12 ASSESSMENT — ENCOUNTER SYMPTOMS
DENIES PAIN: 1
CHANGE IN APPETITE: UNCHANGED
OCCASIONAL FEELINGS OF UNSTEADINESS: 0
LAST BOWEL MOVEMENT: 66786
OCCASIONAL FEELINGS OF UNSTEADINESS: 0
DEPRESSION: 0
LOSS OF SENSATION IN FEET: 0
APPETITE LEVEL: GOOD
LOSS OF SENSATION IN FEET: 0
DENIES PAIN: 1
DEPRESSION: 0
APPETITE LEVEL: GOOD
LAST BOWEL MOVEMENT: 66784

## 2023-11-12 ASSESSMENT — PAIN SCALES - PAIN ASSESSMENT IN ADVANCED DEMENTIA (PAINAD)
FACIALEXPRESSION: 0
BODYLANGUAGE: 0 - RELAXED.
NEGVOCALIZATION: 0
BODYLANGUAGE: 0
NEGVOCALIZATION: 0
NEGVOCALIZATION: 0 - NONE.
NEGVOCALIZATION: 0 - NONE.
BODYLANGUAGE: 0
FACIALEXPRESSION: 0 - SMILING OR INEXPRESSIVE.
BREATHING: 0
FACIALEXPRESSION: 0 - SMILING OR INEXPRESSIVE.
BODYLANGUAGE: 0 - RELAXED.
FACIALEXPRESSION: 0
BREATHING: 0

## 2023-11-12 ASSESSMENT — ACTIVITIES OF DAILY LIVING (ADL)
MONEY MANAGEMENT (EXPENSES/BILLS): INDEPENDENT
MONEY MANAGEMENT (EXPENSES/BILLS): INDEPENDENT

## 2023-11-13 PROCEDURE — 1090000001 HH PPS REVENUE CREDIT

## 2023-11-13 PROCEDURE — 1090000002 HH PPS REVENUE DEBIT

## 2023-11-14 ENCOUNTER — HOME CARE VISIT (OUTPATIENT)
Dept: HOME HEALTH SERVICES | Facility: HOME HEALTH | Age: 58
End: 2023-11-14
Payer: MEDICARE

## 2023-11-14 VITALS
DIASTOLIC BLOOD PRESSURE: 66 MMHG | RESPIRATION RATE: 18 BRPM | HEART RATE: 91 BPM | SYSTOLIC BLOOD PRESSURE: 112 MMHG | TEMPERATURE: 97.8 F

## 2023-11-14 PROCEDURE — G0300 HHS/HOSPICE OF LPN EA 15 MIN: HCPCS | Mod: HHH

## 2023-11-14 PROCEDURE — 0023 HH SOC

## 2023-11-14 PROCEDURE — 1090000002 HH PPS REVENUE DEBIT

## 2023-11-14 PROCEDURE — 1090000001 HH PPS REVENUE CREDIT

## 2023-11-14 ASSESSMENT — ENCOUNTER SYMPTOMS
PAIN LOCATION: LEFT FOOT
HIGHEST PAIN SEVERITY IN PAST 24 HOURS: 8/10
PAIN LOCATION - PAIN QUALITY: ACHING
LOWEST PAIN SEVERITY IN PAST 24 HOURS: 0/10
PAIN LOCATION - RELIEVING FACTORS: MEDICATION
LAST BOWEL MOVEMENT: 66792
PAIN: 1
LOWER EXTREMITY EDEMA: 1
PAIN LOCATION - PAIN FREQUENCY: INFREQUENT
APPETITE LEVEL: GOOD
PAIN SEVERITY GOAL: 0/10
PAIN LOCATION - PAIN SEVERITY: 6/10

## 2023-11-15 ENCOUNTER — HOME CARE VISIT (OUTPATIENT)
Dept: HOME HEALTH SERVICES | Facility: HOME HEALTH | Age: 58
End: 2023-11-15
Payer: MEDICARE

## 2023-11-15 PROCEDURE — G0300 HHS/HOSPICE OF LPN EA 15 MIN: HCPCS | Mod: HHH

## 2023-11-15 PROCEDURE — 1090000001 HH PPS REVENUE CREDIT

## 2023-11-15 PROCEDURE — 1090000002 HH PPS REVENUE DEBIT

## 2023-11-15 ASSESSMENT — ENCOUNTER SYMPTOMS
APPETITE LEVEL: GOOD
LAST BOWEL MOVEMENT: 66793
LOWER EXTREMITY EDEMA: 1

## 2023-11-16 PROCEDURE — 1090000001 HH PPS REVENUE CREDIT

## 2023-11-16 PROCEDURE — 1090000002 HH PPS REVENUE DEBIT

## 2023-11-17 ENCOUNTER — APPOINTMENT (OUTPATIENT)
Dept: WOUND CARE | Facility: HOSPITAL | Age: 58
End: 2023-11-17
Payer: MEDICARE

## 2023-11-17 ENCOUNTER — HOME CARE VISIT (OUTPATIENT)
Dept: HOME HEALTH SERVICES | Facility: HOME HEALTH | Age: 58
End: 2023-11-17
Payer: MEDICARE

## 2023-11-17 VITALS
DIASTOLIC BLOOD PRESSURE: 70 MMHG | HEART RATE: 67 BPM | TEMPERATURE: 97.8 F | SYSTOLIC BLOOD PRESSURE: 130 MMHG | RESPIRATION RATE: 16 BRPM

## 2023-11-17 PROCEDURE — 1090000002 HH PPS REVENUE DEBIT

## 2023-11-17 PROCEDURE — G0300 HHS/HOSPICE OF LPN EA 15 MIN: HCPCS | Mod: HHH

## 2023-11-17 PROCEDURE — 1090000001 HH PPS REVENUE CREDIT

## 2023-11-17 ASSESSMENT — PAIN SCALES - PAIN ASSESSMENT IN ADVANCED DEMENTIA (PAINAD)
BREATHING: 0
NEGVOCALIZATION: 0
BODYLANGUAGE: 0 - RELAXED.
BODYLANGUAGE: 0
NEGVOCALIZATION: 0 - NONE.
FACIALEXPRESSION: 0 - SMILING OR INEXPRESSIVE.
FACIALEXPRESSION: 0

## 2023-11-17 ASSESSMENT — ENCOUNTER SYMPTOMS
LAST BOWEL MOVEMENT: 66794
CHANGE IN APPETITE: UNCHANGED
APPETITE LEVEL: GOOD
DENIES PAIN: 1
OCCASIONAL FEELINGS OF UNSTEADINESS: 0
DEPRESSION: 0
LOSS OF SENSATION IN FEET: 0

## 2023-11-17 ASSESSMENT — ACTIVITIES OF DAILY LIVING (ADL): MONEY MANAGEMENT (EXPENSES/BILLS): INDEPENDENT

## 2023-11-18 PROCEDURE — 1090000002 HH PPS REVENUE DEBIT

## 2023-11-18 PROCEDURE — 1090000001 HH PPS REVENUE CREDIT

## 2023-11-19 PROCEDURE — 1090000002 HH PPS REVENUE DEBIT

## 2023-11-19 PROCEDURE — 1090000001 HH PPS REVENUE CREDIT

## 2023-11-20 ENCOUNTER — HOME CARE VISIT (OUTPATIENT)
Dept: HOME HEALTH SERVICES | Facility: HOME HEALTH | Age: 58
End: 2023-11-20
Payer: MEDICARE

## 2023-11-20 VITALS
HEART RATE: 86 BPM | DIASTOLIC BLOOD PRESSURE: 70 MMHG | RESPIRATION RATE: 16 BRPM | SYSTOLIC BLOOD PRESSURE: 135 MMHG | TEMPERATURE: 97.2 F

## 2023-11-20 PROCEDURE — 1090000001 HH PPS REVENUE CREDIT

## 2023-11-20 PROCEDURE — G0300 HHS/HOSPICE OF LPN EA 15 MIN: HCPCS | Mod: HHH

## 2023-11-20 PROCEDURE — 1090000002 HH PPS REVENUE DEBIT

## 2023-11-20 ASSESSMENT — ENCOUNTER SYMPTOMS
OCCASIONAL FEELINGS OF UNSTEADINESS: 0
CHANGE IN APPETITE: UNCHANGED
DEPRESSION: 0
LOSS OF SENSATION IN FEET: 0
DENIES PAIN: 1
LAST BOWEL MOVEMENT: 66798
APPETITE LEVEL: GOOD

## 2023-11-20 ASSESSMENT — PAIN SCALES - PAIN ASSESSMENT IN ADVANCED DEMENTIA (PAINAD)
FACIALEXPRESSION: 0
BREATHING: 0
NEGVOCALIZATION: 0
BODYLANGUAGE: 0
FACIALEXPRESSION: 0 - SMILING OR INEXPRESSIVE.
NEGVOCALIZATION: 0 - NONE.
BODYLANGUAGE: 0 - RELAXED.

## 2023-11-20 ASSESSMENT — ACTIVITIES OF DAILY LIVING (ADL): MONEY MANAGEMENT (EXPENSES/BILLS): INDEPENDENT

## 2023-11-21 PROCEDURE — 1090000002 HH PPS REVENUE DEBIT

## 2023-11-21 PROCEDURE — 1090000001 HH PPS REVENUE CREDIT

## 2023-11-22 ENCOUNTER — HOME CARE VISIT (OUTPATIENT)
Dept: HOME HEALTH SERVICES | Facility: HOME HEALTH | Age: 58
End: 2023-11-22
Payer: MEDICARE

## 2023-11-22 PROCEDURE — 1090000001 HH PPS REVENUE CREDIT

## 2023-11-22 PROCEDURE — G0300 HHS/HOSPICE OF LPN EA 15 MIN: HCPCS | Mod: HHH

## 2023-11-22 PROCEDURE — 1090000002 HH PPS REVENUE DEBIT

## 2023-11-23 PROCEDURE — 1090000001 HH PPS REVENUE CREDIT

## 2023-11-23 PROCEDURE — 1090000002 HH PPS REVENUE DEBIT

## 2023-11-24 ENCOUNTER — HOME CARE VISIT (OUTPATIENT)
Dept: HOME HEALTH SERVICES | Facility: HOME HEALTH | Age: 58
End: 2023-11-24
Payer: MEDICARE

## 2023-11-24 VITALS
OXYGEN SATURATION: 97 % | DIASTOLIC BLOOD PRESSURE: 84 MMHG | SYSTOLIC BLOOD PRESSURE: 132 MMHG | TEMPERATURE: 97.7 F | HEART RATE: 105 BPM | RESPIRATION RATE: 18 BRPM

## 2023-11-24 PROCEDURE — G0299 HHS/HOSPICE OF RN EA 15 MIN: HCPCS | Mod: HHH

## 2023-11-24 PROCEDURE — 1090000001 HH PPS REVENUE CREDIT

## 2023-11-24 PROCEDURE — 1090000002 HH PPS REVENUE DEBIT

## 2023-11-24 ASSESSMENT — ENCOUNTER SYMPTOMS
PAIN: 1
PERSON REPORTING PAIN: PATIENT
APPETITE LEVEL: GOOD
PAIN LOCATION - PAIN SEVERITY: 6/10
LAST BOWEL MOVEMENT: 66802

## 2023-11-24 NOTE — HOME HEALTH
RN home visit for wound care. VSS. pain at baseline moderate 6/10. Wound care provided per orders, leg felt heavy. Patient had to reschedule [facility] Martin office visit this last week, rescheduled for January. No need for supplies at this time. No falls reported.

## 2023-11-25 PROCEDURE — 1090000001 HH PPS REVENUE CREDIT

## 2023-11-25 PROCEDURE — 1090000002 HH PPS REVENUE DEBIT

## 2023-11-26 PROCEDURE — 1090000001 HH PPS REVENUE CREDIT

## 2023-11-26 PROCEDURE — 1090000002 HH PPS REVENUE DEBIT

## 2023-11-27 PROCEDURE — 1090000002 HH PPS REVENUE DEBIT

## 2023-11-27 PROCEDURE — 1090000001 HH PPS REVENUE CREDIT

## 2023-11-28 ENCOUNTER — HOME CARE VISIT (OUTPATIENT)
Dept: HOME HEALTH SERVICES | Facility: HOME HEALTH | Age: 58
End: 2023-11-28
Payer: MEDICARE

## 2023-11-28 PROCEDURE — 1090000002 HH PPS REVENUE DEBIT

## 2023-11-28 PROCEDURE — G0300 HHS/HOSPICE OF LPN EA 15 MIN: HCPCS | Mod: HHH

## 2023-11-28 PROCEDURE — 1090000001 HH PPS REVENUE CREDIT

## 2023-11-29 PROCEDURE — 1090000001 HH PPS REVENUE CREDIT

## 2023-11-29 PROCEDURE — 1090000002 HH PPS REVENUE DEBIT

## 2023-11-30 ENCOUNTER — APPOINTMENT (OUTPATIENT)
Dept: HOME HEALTH SERVICES | Facility: HOME HEALTH | Age: 58
End: 2023-11-30
Payer: MEDICARE

## 2023-11-30 PROCEDURE — 1090000002 HH PPS REVENUE DEBIT

## 2023-11-30 PROCEDURE — 1090000001 HH PPS REVENUE CREDIT

## 2023-12-01 ENCOUNTER — HOME CARE VISIT (OUTPATIENT)
Dept: HOME HEALTH SERVICES | Facility: HOME HEALTH | Age: 58
End: 2023-12-01
Payer: MEDICARE

## 2023-12-01 VITALS
DIASTOLIC BLOOD PRESSURE: 80 MMHG | OXYGEN SATURATION: 98 % | HEART RATE: 98 BPM | TEMPERATURE: 98.7 F | SYSTOLIC BLOOD PRESSURE: 122 MMHG | RESPIRATION RATE: 18 BRPM

## 2023-12-01 PROCEDURE — G0299 HHS/HOSPICE OF RN EA 15 MIN: HCPCS | Mod: HHH

## 2023-12-01 PROCEDURE — 1090000001 HH PPS REVENUE CREDIT

## 2023-12-01 PROCEDURE — 1090000002 HH PPS REVENUE DEBIT

## 2023-12-01 ASSESSMENT — ENCOUNTER SYMPTOMS
LAST BOWEL MOVEMENT: 66809
CHANGE IN APPETITE: UNCHANGED
PAIN: 1
PAIN LOCATION - PAIN SEVERITY: 6/10
SUBJECTIVE PAIN PROGRESSION: UNCHANGED
APPETITE LEVEL: GOOD
PERSON REPORTING PAIN: PATIENT

## 2023-12-01 NOTE — HOME HEALTH
RN follow up visit for wound care. VSS. Bseline pain score. Medications reviewed. No changes. Wound care provided. No need for supplies at this time. Calendar corrected to run through end of cert period, to be recert next friday    SN to follow for wound care

## 2023-12-02 VITALS
OXYGEN SATURATION: 95 % | HEART RATE: 75 BPM | DIASTOLIC BLOOD PRESSURE: 70 MMHG | SYSTOLIC BLOOD PRESSURE: 130 MMHG | RESPIRATION RATE: 16 BRPM | TEMPERATURE: 97.3 F

## 2023-12-02 PROCEDURE — 1090000001 HH PPS REVENUE CREDIT

## 2023-12-02 PROCEDURE — 1090000002 HH PPS REVENUE DEBIT

## 2023-12-02 ASSESSMENT — PAIN SCALES - PAIN ASSESSMENT IN ADVANCED DEMENTIA (PAINAD)
FACIALEXPRESSION: 0 - SMILING OR INEXPRESSIVE.
FACIALEXPRESSION: 0
BREATHING: 0
NEGVOCALIZATION: 0 - NONE.
BODYLANGUAGE: 0 - RELAXED.
NEGVOCALIZATION: 0 - NONE.
BREATHING: 0
NEGVOCALIZATION: 0
BODYLANGUAGE: 0 - RELAXED.
BODYLANGUAGE: 0
BODYLANGUAGE: 0
NEGVOCALIZATION: 0
FACIALEXPRESSION: 0
FACIALEXPRESSION: 0 - SMILING OR INEXPRESSIVE.

## 2023-12-02 ASSESSMENT — ENCOUNTER SYMPTOMS
DENIES PAIN: 1
DENIES PAIN: 1
APPETITE LEVEL: GOOD
CHANGE IN APPETITE: UNCHANGED
OCCASIONAL FEELINGS OF UNSTEADINESS: 0
LAST BOWEL MOVEMENT: 66806
APPETITE LEVEL: GOOD
OCCASIONAL FEELINGS OF UNSTEADINESS: 0
LOSS OF SENSATION IN FEET: 0
CHANGE IN APPETITE: UNCHANGED
LAST BOWEL MOVEMENT: 66800
LOSS OF SENSATION IN FEET: 0
DEPRESSION: 0
DEPRESSION: 0

## 2023-12-02 ASSESSMENT — ACTIVITIES OF DAILY LIVING (ADL)
MONEY MANAGEMENT (EXPENSES/BILLS): INDEPENDENT
MONEY MANAGEMENT (EXPENSES/BILLS): INDEPENDENT

## 2023-12-03 PROCEDURE — 1090000001 HH PPS REVENUE CREDIT

## 2023-12-03 PROCEDURE — 1090000002 HH PPS REVENUE DEBIT

## 2023-12-04 ENCOUNTER — HOME CARE VISIT (OUTPATIENT)
Dept: HOME HEALTH SERVICES | Facility: HOME HEALTH | Age: 58
End: 2023-12-04
Payer: MEDICARE

## 2023-12-04 VITALS
RESPIRATION RATE: 16 BRPM | TEMPERATURE: 97.7 F | HEART RATE: 91 BPM | DIASTOLIC BLOOD PRESSURE: 70 MMHG | SYSTOLIC BLOOD PRESSURE: 135 MMHG

## 2023-12-04 PROCEDURE — G0300 HHS/HOSPICE OF LPN EA 15 MIN: HCPCS | Mod: HHH

## 2023-12-04 PROCEDURE — 1090000002 HH PPS REVENUE DEBIT

## 2023-12-04 PROCEDURE — 1090000001 HH PPS REVENUE CREDIT

## 2023-12-05 PROCEDURE — 1090000001 HH PPS REVENUE CREDIT

## 2023-12-05 PROCEDURE — 1090000002 HH PPS REVENUE DEBIT

## 2023-12-05 ASSESSMENT — PAIN SCALES - PAIN ASSESSMENT IN ADVANCED DEMENTIA (PAINAD)
BODYLANGUAGE: 0 - RELAXED.
FACIALEXPRESSION: 0 - SMILING OR INEXPRESSIVE.
BREATHING: 0
BODYLANGUAGE: 0
NEGVOCALIZATION: 0 - NONE.
NEGVOCALIZATION: 0
FACIALEXPRESSION: 0

## 2023-12-05 ASSESSMENT — ACTIVITIES OF DAILY LIVING (ADL): MONEY MANAGEMENT (EXPENSES/BILLS): INDEPENDENT

## 2023-12-05 ASSESSMENT — ENCOUNTER SYMPTOMS
LAST BOWEL MOVEMENT: 66812
OCCASIONAL FEELINGS OF UNSTEADINESS: 0
DENIES PAIN: 1
LOSS OF SENSATION IN FEET: 0
APPETITE LEVEL: GOOD
DEPRESSION: 0

## 2023-12-06 ENCOUNTER — HOME CARE VISIT (OUTPATIENT)
Dept: HOME HEALTH SERVICES | Facility: HOME HEALTH | Age: 58
End: 2023-12-06
Payer: MEDICARE

## 2023-12-06 VITALS
DIASTOLIC BLOOD PRESSURE: 80 MMHG | RESPIRATION RATE: 16 BRPM | SYSTOLIC BLOOD PRESSURE: 130 MMHG | HEART RATE: 97 BPM | TEMPERATURE: 97.9 F

## 2023-12-06 PROCEDURE — G0300 HHS/HOSPICE OF LPN EA 15 MIN: HCPCS | Mod: HHH

## 2023-12-06 PROCEDURE — 1090000001 HH PPS REVENUE CREDIT

## 2023-12-06 PROCEDURE — 1090000002 HH PPS REVENUE DEBIT

## 2023-12-07 PROCEDURE — 1090000001 HH PPS REVENUE CREDIT

## 2023-12-07 PROCEDURE — 1090000002 HH PPS REVENUE DEBIT

## 2023-12-08 ENCOUNTER — HOME CARE VISIT (OUTPATIENT)
Dept: HOME HEALTH SERVICES | Facility: HOME HEALTH | Age: 58
End: 2023-12-08
Payer: MEDICARE

## 2023-12-08 VITALS
SYSTOLIC BLOOD PRESSURE: 136 MMHG | RESPIRATION RATE: 18 BRPM | OXYGEN SATURATION: 98 % | DIASTOLIC BLOOD PRESSURE: 82 MMHG | HEART RATE: 108 BPM | TEMPERATURE: 97.4 F

## 2023-12-08 PROCEDURE — 1090000001 HH PPS REVENUE CREDIT

## 2023-12-08 PROCEDURE — G0299 HHS/HOSPICE OF RN EA 15 MIN: HCPCS | Mod: HHH

## 2023-12-08 PROCEDURE — 1090000002 HH PPS REVENUE DEBIT

## 2023-12-08 ASSESSMENT — ENCOUNTER SYMPTOMS
FATIGUES EASILY: 1
APPETITE LEVEL: GOOD
PERSON REPORTING PAIN: PATIENT
CHANGE IN APPETITE: UNCHANGED
SUBJECTIVE PAIN PROGRESSION: UNCHANGED
PAIN LOCATION - PAIN SEVERITY: 6/10
PAIN: 1
MUSCLE WEAKNESS: 1

## 2023-12-08 ASSESSMENT — ACTIVITIES OF DAILY LIVING (ADL)
OASIS_M1830: 01
ENTERING_EXITING_HOME: SUPERVISION

## 2023-12-08 NOTE — HOME HEALTH
RN recertification visit. Continued skilled need for wound care. Patient unable to reach or visualize wound, no caregiver available. VSS. Baseline pain level. Medications Reviewed. No changes. No falls reported.      Wound care performed. Supplies ordered: abd pads, vashe wound solu, non woven gauze, boarded silicone, size f tubular.    OT eval placed for lymphedema assistance.    SN to follow for wound care.

## 2023-12-09 PROCEDURE — 1090000002 HH PPS REVENUE DEBIT

## 2023-12-09 PROCEDURE — 1090000001 HH PPS REVENUE CREDIT

## 2023-12-10 PROCEDURE — 1090000001 HH PPS REVENUE CREDIT

## 2023-12-10 PROCEDURE — 1090000002 HH PPS REVENUE DEBIT

## 2023-12-11 ENCOUNTER — HOME CARE VISIT (OUTPATIENT)
Dept: HOME HEALTH SERVICES | Facility: HOME HEALTH | Age: 58
End: 2023-12-11
Payer: MEDICARE

## 2023-12-11 PROCEDURE — 1090000002 HH PPS REVENUE DEBIT

## 2023-12-11 PROCEDURE — 1090000001 HH PPS REVENUE CREDIT

## 2023-12-11 PROCEDURE — G0300 HHS/HOSPICE OF LPN EA 15 MIN: HCPCS | Mod: HHH

## 2023-12-12 PROCEDURE — 1090000001 HH PPS REVENUE CREDIT

## 2023-12-12 PROCEDURE — 1090000002 HH PPS REVENUE DEBIT

## 2023-12-13 PROCEDURE — 1090000001 HH PPS REVENUE CREDIT

## 2023-12-13 PROCEDURE — 1090000002 HH PPS REVENUE DEBIT

## 2023-12-14 PROCEDURE — 1090000001 HH PPS REVENUE CREDIT

## 2023-12-14 PROCEDURE — 1090000002 HH PPS REVENUE DEBIT

## 2023-12-15 ENCOUNTER — HOME CARE VISIT (OUTPATIENT)
Dept: HOME HEALTH SERVICES | Facility: HOME HEALTH | Age: 58
End: 2023-12-15
Payer: MEDICARE

## 2023-12-15 VITALS
HEART RATE: 103 BPM | OXYGEN SATURATION: 98 % | RESPIRATION RATE: 18 BRPM | DIASTOLIC BLOOD PRESSURE: 64 MMHG | TEMPERATURE: 97.7 F | SYSTOLIC BLOOD PRESSURE: 132 MMHG

## 2023-12-15 PROCEDURE — G0299 HHS/HOSPICE OF RN EA 15 MIN: HCPCS | Mod: HHH

## 2023-12-15 PROCEDURE — 400014 HH F/U

## 2023-12-15 PROCEDURE — 1090000001 HH PPS REVENUE CREDIT

## 2023-12-15 PROCEDURE — 1090000002 HH PPS REVENUE DEBIT

## 2023-12-15 ASSESSMENT — ACTIVITIES OF DAILY LIVING (ADL): MONEY MANAGEMENT (EXPENSES/BILLS): INDEPENDENT

## 2023-12-15 ASSESSMENT — ENCOUNTER SYMPTOMS
PAIN LOCATION - PAIN SEVERITY: 6/10
CHANGE IN APPETITE: UNCHANGED
PAIN: 1
APPETITE LEVEL: GOOD
DENIES PAIN: 1
LOSS OF SENSATION IN FEET: 0
APPETITE LEVEL: GOOD
DEPRESSION: 0
PERSON REPORTING PAIN: PATIENT
SKIN LESIONS: 1
OCCASIONAL FEELINGS OF UNSTEADINESS: 0
SUBJECTIVE PAIN PROGRESSION: UNCHANGED
LAST BOWEL MOVEMENT: 66814

## 2023-12-15 ASSESSMENT — PAIN SCALES - PAIN ASSESSMENT IN ADVANCED DEMENTIA (PAINAD)
BREATHING: 0
BODYLANGUAGE: 0
FACIALEXPRESSION: 0
NEGVOCALIZATION: 0 - NONE.
NEGVOCALIZATION: 0
FACIALEXPRESSION: 0 - SMILING OR INEXPRESSIVE.
BODYLANGUAGE: 0 - RELAXED.

## 2023-12-15 NOTE — HOME HEALTH
RN follow up visit for wound care. VSS. Baseline pain score. Medications reviewed. No changes. Wound care provided. Supply order status checked, RN reached out to [facility] regarding delay. Pictures and measurements uploaded.    SN to follow for wound care

## 2023-12-16 VITALS
RESPIRATION RATE: 16 BRPM | TEMPERATURE: 97.5 F | HEART RATE: 76 BPM | SYSTOLIC BLOOD PRESSURE: 130 MMHG | DIASTOLIC BLOOD PRESSURE: 70 MMHG | OXYGEN SATURATION: 95 %

## 2023-12-16 PROCEDURE — 1090000002 HH PPS REVENUE DEBIT

## 2023-12-16 PROCEDURE — 1090000001 HH PPS REVENUE CREDIT

## 2023-12-16 ASSESSMENT — PAIN SCALES - PAIN ASSESSMENT IN ADVANCED DEMENTIA (PAINAD)
NEGVOCALIZATION: 0
FACIALEXPRESSION: 0 - SMILING OR INEXPRESSIVE.
NEGVOCALIZATION: 0 - NONE.
BREATHING: 0
BODYLANGUAGE: 0 - RELAXED.
BODYLANGUAGE: 0
FACIALEXPRESSION: 0

## 2023-12-16 ASSESSMENT — ACTIVITIES OF DAILY LIVING (ADL): MONEY MANAGEMENT (EXPENSES/BILLS): INDEPENDENT

## 2023-12-16 ASSESSMENT — ENCOUNTER SYMPTOMS
APPETITE LEVEL: GOOD
DEPRESSION: 0
OCCASIONAL FEELINGS OF UNSTEADINESS: 0
CHANGE IN APPETITE: UNCHANGED
LOSS OF SENSATION IN FEET: 0
LAST BOWEL MOVEMENT: 66819

## 2023-12-17 PROCEDURE — 1090000002 HH PPS REVENUE DEBIT

## 2023-12-17 PROCEDURE — 1090000001 HH PPS REVENUE CREDIT

## 2023-12-18 ENCOUNTER — HOME CARE VISIT (OUTPATIENT)
Dept: HOME HEALTH SERVICES | Facility: HOME HEALTH | Age: 58
End: 2023-12-18
Payer: MEDICARE

## 2023-12-18 VITALS
DIASTOLIC BLOOD PRESSURE: 86 MMHG | HEART RATE: 87 BPM | SYSTOLIC BLOOD PRESSURE: 138 MMHG | RESPIRATION RATE: 18 BRPM | TEMPERATURE: 97.6 F

## 2023-12-18 PROCEDURE — 1090000002 HH PPS REVENUE DEBIT

## 2023-12-18 PROCEDURE — 1090000001 HH PPS REVENUE CREDIT

## 2023-12-18 PROCEDURE — G0300 HHS/HOSPICE OF LPN EA 15 MIN: HCPCS | Mod: HHH

## 2023-12-18 ASSESSMENT — ENCOUNTER SYMPTOMS
PAIN SEVERITY GOAL: 0/10
PAIN: 1
PERSON REPORTING PAIN: PATIENT
SUBJECTIVE PAIN PROGRESSION: UNCHANGED
PAIN LOCATION - PAIN SEVERITY: 6/10
HIGHEST PAIN SEVERITY IN PAST 24 HOURS: 8/10
APPETITE LEVEL: GOOD
LAST BOWEL MOVEMENT: 66826
PAIN LOCATION - PAIN FREQUENCY: FREQUENT
PAIN LOCATION: LEFT FOOT
CHANGE IN APPETITE: UNCHANGED
PAIN LOCATION - RELIEVING FACTORS: MEDICATION
LOWEST PAIN SEVERITY IN PAST 24 HOURS: 0/10

## 2023-12-19 PROCEDURE — 1090000001 HH PPS REVENUE CREDIT

## 2023-12-19 PROCEDURE — 1090000002 HH PPS REVENUE DEBIT

## 2023-12-20 ENCOUNTER — HOME CARE VISIT (OUTPATIENT)
Dept: HOME HEALTH SERVICES | Facility: HOME HEALTH | Age: 58
End: 2023-12-20
Payer: MEDICARE

## 2023-12-20 VITALS
HEART RATE: 105 BPM | DIASTOLIC BLOOD PRESSURE: 74 MMHG | SYSTOLIC BLOOD PRESSURE: 118 MMHG | RESPIRATION RATE: 18 BRPM | TEMPERATURE: 97.6 F

## 2023-12-20 PROCEDURE — 1090000001 HH PPS REVENUE CREDIT

## 2023-12-20 PROCEDURE — G0179 MD RECERTIFICATION HHA PT: HCPCS | Performed by: INTERNAL MEDICINE

## 2023-12-20 PROCEDURE — G0300 HHS/HOSPICE OF LPN EA 15 MIN: HCPCS | Mod: HHH

## 2023-12-20 PROCEDURE — 1090000002 HH PPS REVENUE DEBIT

## 2023-12-20 ASSESSMENT — ENCOUNTER SYMPTOMS
HIGHEST PAIN SEVERITY IN PAST 24 HOURS: 10/10
PAIN: 1
PAIN SEVERITY GOAL: 0/10
PAIN LOCATION - PAIN FREQUENCY: FREQUENT
PAIN LOCATION - PAIN SEVERITY: 7/10
SUBJECTIVE PAIN PROGRESSION: UNCHANGED
CHANGE IN APPETITE: UNCHANGED
PAIN LOCATION - RELIEVING FACTORS: MEDICATION
LOWEST PAIN SEVERITY IN PAST 24 HOURS: 2/10
PAIN LOCATION: LEFT FOOT
LAST BOWEL MOVEMENT: 66827
APPETITE LEVEL: GOOD

## 2023-12-21 PROCEDURE — 1090000001 HH PPS REVENUE CREDIT

## 2023-12-21 PROCEDURE — 1090000002 HH PPS REVENUE DEBIT

## 2023-12-22 ENCOUNTER — HOME CARE VISIT (OUTPATIENT)
Dept: HOME HEALTH SERVICES | Facility: HOME HEALTH | Age: 58
End: 2023-12-22
Payer: MEDICARE

## 2023-12-22 VITALS
DIASTOLIC BLOOD PRESSURE: 74 MMHG | HEART RATE: 98 BPM | TEMPERATURE: 97.4 F | SYSTOLIC BLOOD PRESSURE: 134 MMHG | RESPIRATION RATE: 18 BRPM

## 2023-12-22 PROCEDURE — 1090000001 HH PPS REVENUE CREDIT

## 2023-12-22 PROCEDURE — 1090000002 HH PPS REVENUE DEBIT

## 2023-12-22 PROCEDURE — G0300 HHS/HOSPICE OF LPN EA 15 MIN: HCPCS | Mod: HHH

## 2023-12-22 ASSESSMENT — ENCOUNTER SYMPTOMS
LOWEST PAIN SEVERITY IN PAST 24 HOURS: 2/10
PAIN LOCATION: LEFT FOOT
PAIN SEVERITY GOAL: 0/10
HIGHEST PAIN SEVERITY IN PAST 24 HOURS: 8/10
LAST BOWEL MOVEMENT: 66829
PAIN LOCATION - PAIN FREQUENCY: FREQUENT
PAIN: 1
SUBJECTIVE PAIN PROGRESSION: UNCHANGED
CHANGE IN APPETITE: UNCHANGED
LOWER EXTREMITY EDEMA: 1
PAIN LOCATION - PAIN SEVERITY: 6/10
PAIN LOCATION - RELIEVING FACTORS: MEDICATION

## 2023-12-23 PROCEDURE — 1090000001 HH PPS REVENUE CREDIT

## 2023-12-23 PROCEDURE — 1090000002 HH PPS REVENUE DEBIT

## 2023-12-24 PROCEDURE — 1090000002 HH PPS REVENUE DEBIT

## 2023-12-24 PROCEDURE — 1090000001 HH PPS REVENUE CREDIT

## 2023-12-25 ENCOUNTER — HOME CARE VISIT (OUTPATIENT)
Dept: HOME HEALTH SERVICES | Facility: HOME HEALTH | Age: 58
End: 2023-12-25
Payer: MEDICARE

## 2023-12-25 VITALS
RESPIRATION RATE: 18 BRPM | TEMPERATURE: 97.5 F | DIASTOLIC BLOOD PRESSURE: 80 MMHG | SYSTOLIC BLOOD PRESSURE: 140 MMHG | HEART RATE: 93 BPM

## 2023-12-25 PROCEDURE — G0300 HHS/HOSPICE OF LPN EA 15 MIN: HCPCS | Mod: HHH

## 2023-12-25 PROCEDURE — 1090000002 HH PPS REVENUE DEBIT

## 2023-12-25 PROCEDURE — 1090000001 HH PPS REVENUE CREDIT

## 2023-12-25 ASSESSMENT — ENCOUNTER SYMPTOMS
PAIN SEVERITY GOAL: 0/10
LOWEST PAIN SEVERITY IN PAST 24 HOURS: 4/10
PAIN: 1
PAIN LOCATION: LEFT FOOT
SUBJECTIVE PAIN PROGRESSION: UNCHANGED
PAIN LOCATION - PAIN SEVERITY: 7/10
HIGHEST PAIN SEVERITY IN PAST 24 HOURS: 8/10
CHANGE IN APPETITE: UNCHANGED
LAST BOWEL MOVEMENT: 66833
PAIN LOCATION - RELIEVING FACTORS: MEDICATION
PAIN LOCATION - PAIN FREQUENCY: FREQUENT

## 2023-12-26 PROCEDURE — 1090000001 HH PPS REVENUE CREDIT

## 2023-12-26 PROCEDURE — 1090000002 HH PPS REVENUE DEBIT

## 2023-12-27 ENCOUNTER — HOME CARE VISIT (OUTPATIENT)
Dept: HOME HEALTH SERVICES | Facility: HOME HEALTH | Age: 58
End: 2023-12-27
Payer: MEDICARE

## 2023-12-27 VITALS
TEMPERATURE: 97.6 F | HEART RATE: 89 BPM | DIASTOLIC BLOOD PRESSURE: 74 MMHG | SYSTOLIC BLOOD PRESSURE: 136 MMHG | RESPIRATION RATE: 18 BRPM

## 2023-12-27 PROCEDURE — G0300 HHS/HOSPICE OF LPN EA 15 MIN: HCPCS | Mod: HHH

## 2023-12-27 PROCEDURE — 1090000001 HH PPS REVENUE CREDIT

## 2023-12-27 PROCEDURE — 1090000002 HH PPS REVENUE DEBIT

## 2023-12-27 ASSESSMENT — ENCOUNTER SYMPTOMS
LAST BOWEL MOVEMENT: 66835
PAIN SEVERITY GOAL: 0/10
PAIN LOCATION: LEFT FOOT
HIGHEST PAIN SEVERITY IN PAST 24 HOURS: 8/10
SUBJECTIVE PAIN PROGRESSION: UNCHANGED
CHANGE IN APPETITE: UNCHANGED
LOWEST PAIN SEVERITY IN PAST 24 HOURS: 2/10
APPETITE LEVEL: GOOD
PAIN LOCATION - PAIN FREQUENCY: FREQUENT
PAIN: 1
PAIN LOCATION - RELIEVING FACTORS: MEDICATION
PAIN LOCATION - PAIN SEVERITY: 7/10

## 2023-12-28 PROCEDURE — 1090000001 HH PPS REVENUE CREDIT

## 2023-12-28 PROCEDURE — 1090000002 HH PPS REVENUE DEBIT

## 2023-12-29 ENCOUNTER — HOME CARE VISIT (OUTPATIENT)
Dept: HOME HEALTH SERVICES | Facility: HOME HEALTH | Age: 58
End: 2023-12-29
Payer: MEDICARE

## 2023-12-29 VITALS
DIASTOLIC BLOOD PRESSURE: 76 MMHG | TEMPERATURE: 98.1 F | RESPIRATION RATE: 18 BRPM | HEART RATE: 100 BPM | SYSTOLIC BLOOD PRESSURE: 120 MMHG

## 2023-12-29 PROCEDURE — G0300 HHS/HOSPICE OF LPN EA 15 MIN: HCPCS | Mod: HHH

## 2023-12-29 PROCEDURE — 1090000001 HH PPS REVENUE CREDIT

## 2023-12-29 PROCEDURE — 1090000002 HH PPS REVENUE DEBIT

## 2023-12-29 ASSESSMENT — ENCOUNTER SYMPTOMS
CHANGE IN APPETITE: UNCHANGED
PAIN LOCATION: LEFT FOOT
LAST BOWEL MOVEMENT: 66837
LOWEST PAIN SEVERITY IN PAST 24 HOURS: 2/10
PAIN: 1
PAIN SEVERITY GOAL: 0/10
HIGHEST PAIN SEVERITY IN PAST 24 HOURS: 8/10
PAIN LOCATION - PAIN SEVERITY: 5/10
PAIN LOCATION - RELIEVING FACTORS: MEDICATION
APPETITE LEVEL: GOOD
PAIN LOCATION - PAIN FREQUENCY: FREQUENT
PERSON REPORTING PAIN: PATIENT
SUBJECTIVE PAIN PROGRESSION: UNCHANGED

## 2023-12-30 PROCEDURE — 1090000002 HH PPS REVENUE DEBIT

## 2023-12-30 PROCEDURE — 1090000001 HH PPS REVENUE CREDIT

## 2023-12-31 PROCEDURE — 1090000002 HH PPS REVENUE DEBIT

## 2023-12-31 PROCEDURE — 1090000001 HH PPS REVENUE CREDIT

## 2024-01-01 ENCOUNTER — HOME CARE VISIT (OUTPATIENT)
Dept: HOME HEALTH SERVICES | Facility: HOME HEALTH | Age: 59
End: 2024-01-01
Payer: MEDICARE

## 2024-01-01 VITALS
HEART RATE: 100 BPM | OXYGEN SATURATION: 96 % | TEMPERATURE: 98.1 F | RESPIRATION RATE: 18 BRPM | SYSTOLIC BLOOD PRESSURE: 122 MMHG | DIASTOLIC BLOOD PRESSURE: 78 MMHG

## 2024-01-01 PROCEDURE — 1090000001 HH PPS REVENUE CREDIT

## 2024-01-01 PROCEDURE — 1090000002 HH PPS REVENUE DEBIT

## 2024-01-01 PROCEDURE — G0299 HHS/HOSPICE OF RN EA 15 MIN: HCPCS | Mod: HHH

## 2024-01-01 ASSESSMENT — ENCOUNTER SYMPTOMS
PAIN: 1
CHANGE IN APPETITE: UNCHANGED
PERSON REPORTING PAIN: PATIENT
APPETITE LEVEL: GOOD
SUBJECTIVE PAIN PROGRESSION: UNCHANGED
PAIN LOCATION - PAIN SEVERITY: 7/10

## 2024-01-01 NOTE — HOME HEALTH
RN follow up visit for wound care. VSS. Baseline pain score. Medications reviewed. No changes. Wound care provided. Wound bed improved, less sloughing, more light pink to wound bed. Supply order placed for mepilex    SN to follow for wound care

## 2024-01-02 ENCOUNTER — OFFICE VISIT (OUTPATIENT)
Dept: WOUND CARE | Facility: HOSPITAL | Age: 59
End: 2024-01-02
Payer: MEDICARE

## 2024-01-02 VITALS
HEART RATE: 66 BPM | SYSTOLIC BLOOD PRESSURE: 165 MMHG | WEIGHT: 293 LBS | HEIGHT: 62 IN | RESPIRATION RATE: 18 BRPM | BODY MASS INDEX: 53.92 KG/M2 | DIASTOLIC BLOOD PRESSURE: 93 MMHG

## 2024-01-02 DIAGNOSIS — I87.2 VENOUS INSUFFICIENCY: ICD-10-CM

## 2024-01-02 DIAGNOSIS — I89.0 LYMPHEDEMA: ICD-10-CM

## 2024-01-02 DIAGNOSIS — L97.221 CHRONIC ULCER OF LEFT CALF LIMITED TO BREAKDOWN OF SKIN (MULTI): Primary | ICD-10-CM

## 2024-01-02 PROCEDURE — 11042 DBRDMT SUBQ TIS 1ST 20SQCM/<: CPT | Performed by: INTERNAL MEDICINE

## 2024-01-02 PROCEDURE — 11045 DBRDMT SUBQ TISS EACH ADDL: CPT | Performed by: INTERNAL MEDICINE

## 2024-01-02 PROCEDURE — 1090000002 HH PPS REVENUE DEBIT

## 2024-01-02 PROCEDURE — 1090000001 HH PPS REVENUE CREDIT

## 2024-01-02 PROCEDURE — 3008F BODY MASS INDEX DOCD: CPT | Performed by: INTERNAL MEDICINE

## 2024-01-02 PROCEDURE — 1036F TOBACCO NON-USER: CPT | Performed by: INTERNAL MEDICINE

## 2024-01-02 RX ORDER — IBUPROFEN 200 MG
600 TABLET ORAL EVERY 8 HOURS PRN
COMMUNITY

## 2024-01-02 ASSESSMENT — COLUMBIA-SUICIDE SEVERITY RATING SCALE - C-SSRS
6. HAVE YOU EVER DONE ANYTHING, STARTED TO DO ANYTHING, OR PREPARED TO DO ANYTHING TO END YOUR LIFE?: NO
2. HAVE YOU ACTUALLY HAD ANY THOUGHTS OF KILLING YOURSELF?: NO
1. IN THE PAST MONTH, HAVE YOU WISHED YOU WERE DEAD OR WISHED YOU COULD GO TO SLEEP AND NOT WAKE UP?: NO

## 2024-01-02 ASSESSMENT — PAIN SCALES - GENERAL: PAINLEVEL: 6

## 2024-01-02 ASSESSMENT — ENCOUNTER SYMPTOMS
LOSS OF SENSATION IN FEET: 0
DEPRESSION: 0
OCCASIONAL FEELINGS OF UNSTEADINESS: 0

## 2024-01-02 NOTE — PATIENT INSTRUCTIONS
Assessment/Plan   Ulcers larger since last seen. Change dressing as noted. Compression outdated - renew tubigrip. Likely needs better compression. Elevate at night and at rest. Follow up 3 weeks. Continue with [facility]     VISIT ORDERS:  Vascular Study Required: n  Labs Required: n  Radiology Imaging Required: n  Consultation Required: n  Rx Provided:   Changes to Plan of Care: y      NEW ORDERS:  Wash: CHG  Irrigate/Soak:  Skin Care: A&D  Dressing:  triple helix and mepilex change MWF  Compression: double tubigrip leonel  Offloading: keep pressure off the ulcers    DISCHARGE PLANNING:    Follow Up: 3 week  Nurse Visit: prn    General Instructions:  Center RN to apply EMLA/Lidocaine 1% jelly/LMX  topically to wound(s) prior to debridement by physician.  Center RN my see patient as a nurse consult in my absence.      RN Post Procedure Note:        HCC/ECF/Assisted Living  Agency/Facility: [facility]  days/week: Monday/Wednesday/Friday  Contacted Regarding Changes: yes

## 2024-01-03 ENCOUNTER — HOME CARE VISIT (OUTPATIENT)
Dept: HOME HEALTH SERVICES | Facility: HOME HEALTH | Age: 59
End: 2024-01-03
Payer: MEDICARE

## 2024-01-03 VITALS
DIASTOLIC BLOOD PRESSURE: 90 MMHG | RESPIRATION RATE: 18 BRPM | TEMPERATURE: 97.8 F | SYSTOLIC BLOOD PRESSURE: 146 MMHG | HEART RATE: 84 BPM

## 2024-01-03 PROCEDURE — 1090000002 HH PPS REVENUE DEBIT

## 2024-01-03 PROCEDURE — 1090000001 HH PPS REVENUE CREDIT

## 2024-01-03 PROCEDURE — G0300 HHS/HOSPICE OF LPN EA 15 MIN: HCPCS | Mod: HHH

## 2024-01-03 ASSESSMENT — ENCOUNTER SYMPTOMS
APPETITE LEVEL: GOOD
LAST BOWEL MOVEMENT: 66841
PAIN LOCATION - PAIN FREQUENCY: FREQUENT
PAIN LOCATION - RELIEVING FACTORS: MEDICATION
PAIN LOCATION - PAIN SEVERITY: 7/10
LOWER EXTREMITY EDEMA: 1
PAIN: 1
SUBJECTIVE PAIN PROGRESSION: UNCHANGED
PAIN LOCATION: LEFT FOOT
PAIN SEVERITY GOAL: 0/10
LOWEST PAIN SEVERITY IN PAST 24 HOURS: 4/10
HIGHEST PAIN SEVERITY IN PAST 24 HOURS: 10/10

## 2024-01-04 PROCEDURE — 1090000001 HH PPS REVENUE CREDIT

## 2024-01-04 PROCEDURE — 1090000002 HH PPS REVENUE DEBIT

## 2024-01-05 ENCOUNTER — HOME CARE VISIT (OUTPATIENT)
Dept: HOME HEALTH SERVICES | Facility: HOME HEALTH | Age: 59
End: 2024-01-05
Payer: MEDICARE

## 2024-01-05 VITALS
OXYGEN SATURATION: 96 % | HEART RATE: 86 BPM | TEMPERATURE: 97.8 F | SYSTOLIC BLOOD PRESSURE: 134 MMHG | RESPIRATION RATE: 18 BRPM | DIASTOLIC BLOOD PRESSURE: 84 MMHG

## 2024-01-05 PROCEDURE — G0299 HHS/HOSPICE OF RN EA 15 MIN: HCPCS | Mod: HHH

## 2024-01-05 PROCEDURE — 1090000001 HH PPS REVENUE CREDIT

## 2024-01-05 PROCEDURE — 1090000002 HH PPS REVENUE DEBIT

## 2024-01-05 ASSESSMENT — ENCOUNTER SYMPTOMS
SUBJECTIVE PAIN PROGRESSION: WAXING AND WANING
LIMITED RANGE OF MOTION: 1
SKIN LESIONS: 1
PERSON REPORTING PAIN: PATIENT
APPETITE LEVEL: GOOD
PAIN: 1
CHANGE IN APPETITE: UNCHANGED
PAIN LOCATION - PAIN SEVERITY: 7/10
HIGHEST PAIN SEVERITY IN PAST 24 HOURS: 8/10

## 2024-01-05 NOTE — Clinical Note
helix on wound bed, then hydrofera blue then mepilex. Patient showered and removed drsg prior to my arrival, but showed me she removed hydrofera camelia from leg.  ----- Message -----  From: Adelita Martin MD  Sent: 1/6/2024  10:59 AM EST  To: Emi Garcia RN; Fina East RN      Triple helix patch    Please clarify if you removed a hydrofera blue from her leg bc that should NOT be the case. AND we would NEVER place hydrofera blue under the triple helix - that makes no sense.  tc  ----- Message -----  From: Emi Garcia RN  Sent: 1/5/2024   8:28 PM EST  To: Adelita Martin MD    Hello Dr. Martin. Just wanted to clarify orders from most recent visit. I have copied and paste what is written in office note:  NEW ORDERS:  Wash: CHG  Irrigate/Soak:  Skin Care: A&D  Dressing:  triple helix and mepilex change MWF  Compression: double tubigrip leonel  Offloading: keep pressure off the ulcers    Do you want triple helix powder or patch? Patient stating office RN also placed hydrofera camelia between helix and mepilex.   Please clarify.

## 2024-01-05 NOTE — HOME HEALTH
RN follow up visit for wound care. VSS. Pain slightly increased since recent MD apt with debridement, but slowly improving. Medications reviewed. Patient compliant. New wound care orders placed, this RN did reach out to RN who wrote note, patient stating they continued to use hydrofera camelia but it is not in new order. RN has yet to respond. Unable to forward to [place], as theyre status is office line. Case communication sent. Pictures and measurements uploaded. Wound office also added a sacral wound and thigh wound to avatar...these do not exist. [facility] ordering website giving error message. SN to order materials at next visit.     SN to follow for wound care

## 2024-01-05 NOTE — Clinical Note
Hue Martin. Just wanted to clarify orders from most recent visit. I have copied and paste what is written in office note:  NEW ORDERS:  Wash: CHG  Irrigate/Soak:  Skin Care: A&D  Dressing:  triple helix and mepilex change MWF  Compression: double tubigrip leonel  Offloading: keep pressure off the ulcers    Do you want triple helix powder or patch? Patient stating office RN also placed hydrofera camelia between helix and mepilex.   Please clarify.

## 2024-01-06 PROCEDURE — 1090000002 HH PPS REVENUE DEBIT

## 2024-01-06 PROCEDURE — 1090000001 HH PPS REVENUE CREDIT

## 2024-01-07 PROCEDURE — 1090000001 HH PPS REVENUE CREDIT

## 2024-01-07 PROCEDURE — 1090000002 HH PPS REVENUE DEBIT

## 2024-01-08 ENCOUNTER — HOME CARE VISIT (OUTPATIENT)
Dept: HOME HEALTH SERVICES | Facility: HOME HEALTH | Age: 59
End: 2024-01-08
Payer: MEDICARE

## 2024-01-08 VITALS
TEMPERATURE: 97.6 F | RESPIRATION RATE: 18 BRPM | HEART RATE: 99 BPM | SYSTOLIC BLOOD PRESSURE: 108 MMHG | DIASTOLIC BLOOD PRESSURE: 66 MMHG

## 2024-01-08 PROCEDURE — G0300 HHS/HOSPICE OF LPN EA 15 MIN: HCPCS | Mod: HHH

## 2024-01-08 PROCEDURE — 1090000002 HH PPS REVENUE DEBIT

## 2024-01-08 PROCEDURE — 1090000001 HH PPS REVENUE CREDIT

## 2024-01-08 ASSESSMENT — ENCOUNTER SYMPTOMS
SUBJECTIVE PAIN PROGRESSION: UNCHANGED
CHANGE IN APPETITE: UNCHANGED
PAIN LOCATION - RELIEVING FACTORS: MEDICATION
PAIN LOCATION: LEFT FOOT
PERSON REPORTING PAIN: PATIENT
PAIN LOCATION - PAIN FREQUENCY: FREQUENT
HIGHEST PAIN SEVERITY IN PAST 24 HOURS: 8/10
PAIN LOCATION - PAIN SEVERITY: 6/10
LOWEST PAIN SEVERITY IN PAST 24 HOURS: 4/10
LOWER EXTREMITY EDEMA: 1
APPETITE LEVEL: GOOD
PAIN: 1
PAIN SEVERITY GOAL: 0/10
LAST BOWEL MOVEMENT: 66847

## 2024-01-09 PROCEDURE — 1090000002 HH PPS REVENUE DEBIT

## 2024-01-09 PROCEDURE — 1090000001 HH PPS REVENUE CREDIT

## 2024-01-10 ENCOUNTER — HOME CARE VISIT (OUTPATIENT)
Dept: HOME HEALTH SERVICES | Facility: HOME HEALTH | Age: 59
End: 2024-01-10
Payer: MEDICARE

## 2024-01-10 PROCEDURE — 1090000001 HH PPS REVENUE CREDIT

## 2024-01-10 PROCEDURE — 1090000003 HH PPS REVENUE ADJ

## 2024-01-10 PROCEDURE — 1090000002 HH PPS REVENUE DEBIT

## 2024-01-11 PROCEDURE — 1090000002 HH PPS REVENUE DEBIT

## 2024-01-11 PROCEDURE — 1090000001 HH PPS REVENUE CREDIT

## 2024-01-12 PROCEDURE — 1090000002 HH PPS REVENUE DEBIT

## 2024-01-12 PROCEDURE — 1090000001 HH PPS REVENUE CREDIT

## 2024-01-13 ENCOUNTER — HOME CARE VISIT (OUTPATIENT)
Dept: HOME HEALTH SERVICES | Facility: HOME HEALTH | Age: 59
End: 2024-01-13
Payer: MEDICARE

## 2024-01-13 VITALS
HEART RATE: 102 BPM | SYSTOLIC BLOOD PRESSURE: 132 MMHG | OXYGEN SATURATION: 97 % | DIASTOLIC BLOOD PRESSURE: 84 MMHG | TEMPERATURE: 97.6 F | RESPIRATION RATE: 16 BRPM

## 2024-01-13 PROCEDURE — 1090000001 HH PPS REVENUE CREDIT

## 2024-01-13 PROCEDURE — 1090000002 HH PPS REVENUE DEBIT

## 2024-01-13 PROCEDURE — G0300 HHS/HOSPICE OF LPN EA 15 MIN: HCPCS | Mod: HHH

## 2024-01-13 PROCEDURE — 400014 HH F/U

## 2024-01-13 ASSESSMENT — ENCOUNTER SYMPTOMS
APPETITE LEVEL: GOOD
CHANGE IN APPETITE: UNCHANGED

## 2024-01-14 PROCEDURE — 1090000002 HH PPS REVENUE DEBIT

## 2024-01-14 PROCEDURE — 1090000001 HH PPS REVENUE CREDIT

## 2024-01-15 ENCOUNTER — HOME CARE VISIT (OUTPATIENT)
Dept: HOME HEALTH SERVICES | Facility: HOME HEALTH | Age: 59
End: 2024-01-15
Payer: MEDICARE

## 2024-01-15 VITALS
SYSTOLIC BLOOD PRESSURE: 130 MMHG | RESPIRATION RATE: 16 BRPM | TEMPERATURE: 97.6 F | HEART RATE: 78 BPM | DIASTOLIC BLOOD PRESSURE: 80 MMHG

## 2024-01-15 PROCEDURE — 1090000001 HH PPS REVENUE CREDIT

## 2024-01-15 PROCEDURE — 1090000002 HH PPS REVENUE DEBIT

## 2024-01-15 PROCEDURE — G0300 HHS/HOSPICE OF LPN EA 15 MIN: HCPCS | Mod: HHH

## 2024-01-16 PROCEDURE — 1090000002 HH PPS REVENUE DEBIT

## 2024-01-16 PROCEDURE — 1090000001 HH PPS REVENUE CREDIT

## 2024-01-17 ENCOUNTER — HOME CARE VISIT (OUTPATIENT)
Dept: HOME HEALTH SERVICES | Facility: HOME HEALTH | Age: 59
End: 2024-01-17
Payer: MEDICARE

## 2024-01-17 VITALS
SYSTOLIC BLOOD PRESSURE: 128 MMHG | RESPIRATION RATE: 16 BRPM | TEMPERATURE: 97.5 F | DIASTOLIC BLOOD PRESSURE: 65 MMHG | HEART RATE: 89 BPM

## 2024-01-17 PROCEDURE — G0300 HHS/HOSPICE OF LPN EA 15 MIN: HCPCS | Mod: HHH

## 2024-01-17 PROCEDURE — 1090000002 HH PPS REVENUE DEBIT

## 2024-01-17 PROCEDURE — 1090000001 HH PPS REVENUE CREDIT

## 2024-01-18 PROCEDURE — 1090000002 HH PPS REVENUE DEBIT

## 2024-01-18 PROCEDURE — 1090000001 HH PPS REVENUE CREDIT

## 2024-01-19 ENCOUNTER — APPOINTMENT (OUTPATIENT)
Dept: HOME HEALTH SERVICES | Facility: HOME HEALTH | Age: 59
End: 2024-01-19
Payer: MEDICARE

## 2024-01-19 PROCEDURE — 1090000002 HH PPS REVENUE DEBIT

## 2024-01-19 PROCEDURE — 1090000001 HH PPS REVENUE CREDIT

## 2024-01-20 PROCEDURE — 1090000002 HH PPS REVENUE DEBIT

## 2024-01-20 PROCEDURE — 1090000001 HH PPS REVENUE CREDIT

## 2024-01-21 PROCEDURE — 1090000001 HH PPS REVENUE CREDIT

## 2024-01-21 PROCEDURE — 1090000002 HH PPS REVENUE DEBIT

## 2024-01-22 ENCOUNTER — HOME CARE VISIT (OUTPATIENT)
Dept: HOME HEALTH SERVICES | Facility: HOME HEALTH | Age: 59
End: 2024-01-22
Payer: MEDICARE

## 2024-01-22 VITALS
TEMPERATURE: 98.5 F | DIASTOLIC BLOOD PRESSURE: 78 MMHG | SYSTOLIC BLOOD PRESSURE: 130 MMHG | RESPIRATION RATE: 18 BRPM | HEART RATE: 95 BPM

## 2024-01-22 PROCEDURE — G0300 HHS/HOSPICE OF LPN EA 15 MIN: HCPCS | Mod: HHH

## 2024-01-22 PROCEDURE — 1090000001 HH PPS REVENUE CREDIT

## 2024-01-22 PROCEDURE — 1090000002 HH PPS REVENUE DEBIT

## 2024-01-22 ASSESSMENT — ENCOUNTER SYMPTOMS
PAIN LOCATION - PAIN FREQUENCY: FREQUENT
APPETITE LEVEL: GOOD
PAIN LOCATION - PAIN SEVERITY: 6/10
PAIN LOCATION - RELIEVING FACTORS: MEDICATION
LOWEST PAIN SEVERITY IN PAST 24 HOURS: 4/10
PAIN SEVERITY GOAL: 0/10
LOWER EXTREMITY EDEMA: 1
PAIN: 1
HIGHEST PAIN SEVERITY IN PAST 24 HOURS: 8/10
PERSON REPORTING PAIN: PATIENT
CHANGE IN APPETITE: UNCHANGED
SUBJECTIVE PAIN PROGRESSION: UNCHANGED
LAST BOWEL MOVEMENT: 66861
PAIN LOCATION: LEFT FOOT

## 2024-01-23 PROCEDURE — 1090000002 HH PPS REVENUE DEBIT

## 2024-01-23 PROCEDURE — 1090000001 HH PPS REVENUE CREDIT

## 2024-01-24 ENCOUNTER — HOME CARE VISIT (OUTPATIENT)
Dept: HOME HEALTH SERVICES | Facility: HOME HEALTH | Age: 59
End: 2024-01-24
Payer: MEDICARE

## 2024-01-24 PROCEDURE — 1090000002 HH PPS REVENUE DEBIT

## 2024-01-24 PROCEDURE — G0300 HHS/HOSPICE OF LPN EA 15 MIN: HCPCS | Mod: HHH

## 2024-01-24 PROCEDURE — 1090000001 HH PPS REVENUE CREDIT

## 2024-01-24 ASSESSMENT — ENCOUNTER SYMPTOMS
OCCASIONAL FEELINGS OF UNSTEADINESS: 0
SUBJECTIVE PAIN PROGRESSION: UNCHANGED
PAIN LOCATION: LEFT LEG
HIGHEST PAIN SEVERITY IN PAST 24 HOURS: 6/10
APPETITE LEVEL: GOOD
CHANGE IN APPETITE: UNCHANGED
PAIN SEVERITY GOAL: 0/10
PAIN: 1
LOWEST PAIN SEVERITY IN PAST 24 HOURS: 6/10
PERSON REPORTING PAIN: PATIENT

## 2024-01-25 PROCEDURE — 1090000002 HH PPS REVENUE DEBIT

## 2024-01-25 PROCEDURE — 1090000001 HH PPS REVENUE CREDIT

## 2024-01-26 ENCOUNTER — HOME CARE VISIT (OUTPATIENT)
Dept: HOME HEALTH SERVICES | Facility: HOME HEALTH | Age: 59
End: 2024-01-26
Payer: MEDICARE

## 2024-01-26 PROCEDURE — G0300 HHS/HOSPICE OF LPN EA 15 MIN: HCPCS | Mod: HHH

## 2024-01-26 PROCEDURE — 1090000001 HH PPS REVENUE CREDIT

## 2024-01-26 PROCEDURE — 1090000002 HH PPS REVENUE DEBIT

## 2024-01-27 PROCEDURE — 1090000002 HH PPS REVENUE DEBIT

## 2024-01-27 PROCEDURE — 1090000001 HH PPS REVENUE CREDIT

## 2024-01-27 ASSESSMENT — PAIN SCALES - PAIN ASSESSMENT IN ADVANCED DEMENTIA (PAINAD)
NEGVOCALIZATION: 0
FACIALEXPRESSION: 0 - SMILING OR INEXPRESSIVE.
NEGVOCALIZATION: 0 - NONE.
FACIALEXPRESSION: 0
BODYLANGUAGE: 0
BODYLANGUAGE: 0
BODYLANGUAGE: 0 - RELAXED.
FACIALEXPRESSION: 0 - SMILING OR INEXPRESSIVE.
BREATHING: 0
NEGVOCALIZATION: 0 - NONE.
BODYLANGUAGE: 0 - RELAXED.
BREATHING: 0
FACIALEXPRESSION: 0
NEGVOCALIZATION: 0

## 2024-01-27 ASSESSMENT — ENCOUNTER SYMPTOMS
LAST BOWEL MOVEMENT: 66854
APPETITE LEVEL: GOOD
LAST BOWEL MOVEMENT: 66856
APPETITE LEVEL: GOOD
OCCASIONAL FEELINGS OF UNSTEADINESS: 0
DENIES PAIN: 1
DENIES PAIN: 1
OCCASIONAL FEELINGS OF UNSTEADINESS: 0

## 2024-01-27 ASSESSMENT — ACTIVITIES OF DAILY LIVING (ADL)
MONEY MANAGEMENT (EXPENSES/BILLS): INDEPENDENT
MONEY MANAGEMENT (EXPENSES/BILLS): INDEPENDENT

## 2024-01-28 PROCEDURE — 1090000002 HH PPS REVENUE DEBIT

## 2024-01-28 PROCEDURE — 1090000001 HH PPS REVENUE CREDIT

## 2024-01-29 ENCOUNTER — HOME CARE VISIT (OUTPATIENT)
Dept: HOME HEALTH SERVICES | Facility: HOME HEALTH | Age: 59
End: 2024-01-29
Payer: MEDICARE

## 2024-01-29 VITALS
SYSTOLIC BLOOD PRESSURE: 134 MMHG | RESPIRATION RATE: 18 BRPM | HEART RATE: 94 BPM | TEMPERATURE: 97.8 F | DIASTOLIC BLOOD PRESSURE: 74 MMHG

## 2024-01-29 VITALS — DIASTOLIC BLOOD PRESSURE: 64 MMHG | TEMPERATURE: 97.2 F | SYSTOLIC BLOOD PRESSURE: 124 MMHG | HEART RATE: 105 BPM

## 2024-01-29 PROCEDURE — 1090000001 HH PPS REVENUE CREDIT

## 2024-01-29 PROCEDURE — G0300 HHS/HOSPICE OF LPN EA 15 MIN: HCPCS | Mod: HHH

## 2024-01-29 PROCEDURE — 1090000002 HH PPS REVENUE DEBIT

## 2024-01-29 ASSESSMENT — ENCOUNTER SYMPTOMS
PAIN LOCATION - PAIN SEVERITY: 6/10
SUBJECTIVE PAIN PROGRESSION: UNCHANGED
OCCASIONAL FEELINGS OF UNSTEADINESS: 0
PAIN SEVERITY GOAL: 0/10
LOWER EXTREMITY EDEMA: 1
CHANGE IN APPETITE: UNCHANGED
PAIN SEVERITY GOAL: 0/10
PAIN LOCATION: LEFT FOOT
PAIN LOCATION - PAIN QUALITY: ACHING
LOWEST PAIN SEVERITY IN PAST 24 HOURS: 4/10
PAIN LOCATION - RELIEVING FACTORS: MEDICATION
LAST BOWEL MOVEMENT: 66867
CHANGE IN APPETITE: UNCHANGED
HIGHEST PAIN SEVERITY IN PAST 24 HOURS: 8/10
PAIN LOCATION - PAIN FREQUENCY: FREQUENT
SUBJECTIVE PAIN PROGRESSION: UNCHANGED
PAIN: 1
PAIN: 1
LOWEST PAIN SEVERITY IN PAST 24 HOURS: 6/10
HIGHEST PAIN SEVERITY IN PAST 24 HOURS: 6/10
APPETITE LEVEL: GOOD
APPETITE LEVEL: GOOD

## 2024-01-30 ENCOUNTER — OFFICE VISIT (OUTPATIENT)
Dept: WOUND CARE | Facility: HOSPITAL | Age: 59
End: 2024-01-30
Payer: MEDICARE

## 2024-01-30 VITALS
HEIGHT: 62 IN | HEART RATE: 102 BPM | BODY MASS INDEX: 53.92 KG/M2 | WEIGHT: 293 LBS | RESPIRATION RATE: 18 BRPM | DIASTOLIC BLOOD PRESSURE: 80 MMHG | SYSTOLIC BLOOD PRESSURE: 150 MMHG

## 2024-01-30 DIAGNOSIS — I89.0 LYMPHEDEMA: ICD-10-CM

## 2024-01-30 DIAGNOSIS — L97.221 CHRONIC ULCER OF LEFT CALF LIMITED TO BREAKDOWN OF SKIN (MULTI): Primary | ICD-10-CM

## 2024-01-30 PROCEDURE — 1090000001 HH PPS REVENUE CREDIT

## 2024-01-30 PROCEDURE — 1090000002 HH PPS REVENUE DEBIT

## 2024-01-30 PROCEDURE — 1036F TOBACCO NON-USER: CPT | Performed by: INTERNAL MEDICINE

## 2024-01-30 PROCEDURE — 3008F BODY MASS INDEX DOCD: CPT | Performed by: INTERNAL MEDICINE

## 2024-01-30 PROCEDURE — 11042 DBRDMT SUBQ TIS 1ST 20SQCM/<: CPT | Performed by: INTERNAL MEDICINE

## 2024-01-30 PROCEDURE — 11045 DBRDMT SUBQ TISS EACH ADDL: CPT | Performed by: INTERNAL MEDICINE

## 2024-01-30 ASSESSMENT — PAIN SCALES - GENERAL: PAINLEVEL: 6

## 2024-01-30 NOTE — PATIENT INSTRUCTIONS
Assessment/Plan   Multiple LLE ulcers as noted. Hydrofera blue has improved the base. Change dressing to fibracol and dry dressing change MWF. Continue the double tubigrip for compression. Elevate AMAP. Follow up 3 weeks.      VISIT ORDERS:  Vascular Study Required: n  Labs Required: n  Radiology Imaging Required: n  Consultation Required: n  Rx Provided:   Changes to Plan of Care: y      NEW ORDERS:  Wash: Michell  Irrigate/Soak:  Skin Care:  Dressing:  fibracol and dry dressing  Compression:  double tubigrip   Offloading:     DISCHARGE PLANNING:    Follow Up: 3 weeks continue [facility]  Nurse Visit: prn

## 2024-01-30 NOTE — PROGRESS NOTES
"REFERRAL REQUESTED BY:  Ramesh Rothman DO PCP  LAST SEEN:  01/02/2024    Patient ID: Patricia Mckeon is a 58 y.o. female     HPI:   Here for follow up LLE lymphedema and ulcers. Reports new ulcers. Reports the swelling has improved.     CURRENT DRESSING:  Who is performing the dressing change:  [facility]  Dressing applied: Hydrofera blue and mepilex to both Lateral ulcers  Dressing Frequency: 3x/wk MWF    EDEMA MANAGEMENT:  Compression:  Right: Double Tubigrip CALF 53.0 CM   Left: Double Tubigrip   CALF 85.0 CM     Other Modality  Sleeping in Bed: yes  Elevating FOB:  yes    Offloading/Pressure Relief  Activity Level:  ad bradly  Protection Devices:  Post op shoe LLE    Offloading/Pressure Relief Effective?     ROS:      Objective     VITALS:  /80 (BP Location: Left arm)   Pulse 102   Resp 18   Ht 1.575 m (5' 2\")   Wt (!) 169 kg (371 lb 9.6 oz)   BMI 67.97 kg/m²       Physical Exam    ARRIVAL:  Ambulating and Walker  TRANSFER:  None    PSYCHIATRIC:  Oriented to person, place and time: A & O x 3    CONSTITUTIONAL:    Appearance:  Well Groomed    SKIN:   scarring related to prev surgery, lymphedema     PULSES:     EXTREMITY TEMPERATURE:    RIGHT: normal  LEFT: normal    EDEMA: severe    WOUND ASSESSMENT:    Wound 05/23/22 Venous Ulcer Leg Left;Lateral (Active)   Date First Assessed: 05/23/22   Present on Original Admission: Yes  Primary Wound Type: Venous Ulcer  Location: Leg  Wound Location Orientation: (c) Left;Lateral      Assessments 9/13/2023  3:20 PM 1/30/2024  8:45 AM   Wound Image      Site Assessment Fibrinous Pink;Red;Fibrinous   Cindi-Wound Assessment Intact;Erythematous Intact   Non-staged Wound Description Full thickness Full thickness   Wound Length (cm) 2.1 cm 3.9 cm   Wound Width (cm) 4.2 cm 0.7 cm   Wound Surface Area (cm^2) 8.82 cm^2 2.73 cm^2   Wound Depth (cm) 0.4 cm 0.6 cm   Wound Volume (cm^3) 3.528 cm^3 1.638 cm^3   Wound Healing % -- 54   Wound Bed Granulation (%) -- 25 % "   Drainage Description Serosanguineous Serosanguineous   Drainage Amount Small Small       Inactive Orders   Date Order Priority Status Authorizing Provider   01/02/24 1056 Debridement Routine Completed Adelita Martin MD   10/09/23 1135 Debridement Routine Completed Adelita Martin MD       Wound 05/23/22 Venous Ulcer Leg Left;Anterior;Lateral (Active)   Date First Assessed: 05/23/22   Present on Original Admission: Yes  Primary Wound Type: Venous Ulcer  Location: (c) Leg  Wound Location Orientation: Left;Anterior;Lateral      Assessments 9/13/2023  3:23 PM 1/30/2024  8:43 AM   Wound Image      Site Assessment Granulation Pink;Red;Fibrinous   Cindi-Wound Assessment Erythematous Intact   Non-staged Wound Description Full thickness Full thickness   Wound Length (cm) -- 3.6 cm   Wound Width (cm) -- 6.1 cm   Wound Surface Area (cm^2) -- 21.96 cm^2   Wound Depth (cm) -- 0.2 cm   Wound Volume (cm^3) -- 4.392 cm^3   Wound Healing % -- -1345   Wound Bed Granulation (%) -- 10 %   Drainage Description -- Serosanguineous   Drainage Amount -- Small       Inactive Orders   Date Order Priority Status Authorizing Provider   01/02/24 1059 Debridement Routine Completed Adelita Martin MD   10/09/23 1136 Debridement Routine Completed Adelita Martin MD       Wound 01/29/24 Venous Ulcer Leg Left;Medial (Active)   Date First Assessed: 01/29/24   Hand Hygiene Completed: Yes  Primary Wound Type: Venous Ulcer  Location: Leg  Wound Location Orientation: Left;Medial      Assessments 1/30/2024  8:48 AM   Wound Image     Site Assessment Pink;Red;Fibrinous   Cindi-Wound Assessment Intact   Non-staged Wound Description Full thickness   Wound Length (cm) 0.9 cm   Wound Width (cm) 0.7 cm   Wound Surface Area (cm^2) 0.63 cm^2   Wound Depth (cm) 0.1 cm   Wound Volume (cm^3) 0.063 cm^3   Wound Bed Granulation (%) 50 %   Drainage Description Serosanguineous   Drainage Amount Small       No associated orders.       Wound 01/29/24 Venous Ulcer Leg  Left;Proximal;Lateral (Active)   Date First Assessed: 01/29/24   Hand Hygiene Completed: Yes  Primary Wound Type: Venous Ulcer  Location: Leg  Wound Location Orientation: Left;Proximal;Lateral      Assessments 1/30/2024  8:46 AM   Wound Image     Site Assessment Pink;Red;Fibrinous   Cindi-Wound Assessment Intact   Non-staged Wound Description Full thickness   Wound Length (cm) 2.6 cm   Wound Width (cm) 3.7 cm   Wound Surface Area (cm^2) 9.62 cm^2   Wound Depth (cm) 0.1 cm   Wound Volume (cm^3) 0.962 cm^3   Wound Bed Granulation (%) 10 %   Drainage Description Serosanguineous   Drainage Amount Small       No associated orders.       Wound 01/30/24 Venous Ulcer Leg Left;Dorsal (Active)   Date First Assessed: 01/30/24   Hand Hygiene Completed: Yes  Primary Wound Type: Venous Ulcer  Location: Leg  Wound Location Orientation: Left;Dorsal      Assessments 1/30/2024  8:51 AM   Wound Image     Site Assessment Pink;Red;Fibrinous   Cindi-Wound Assessment Intact   Non-staged Wound Description Full thickness   Wound Length (cm) 0.3 cm   Wound Width (cm) 0.5 cm   Wound Surface Area (cm^2) 0.15 cm^2   Wound Depth (cm) 0.2 cm   Wound Volume (cm^3) 0.03 cm^3   Wound Bed Granulation (%) 50 %   Drainage Description Serosanguineous   Drainage Amount Small       No associated orders.       Wound 01/30/24 Venous Ulcer Leg Left;Anterior (Active)   Date First Assessed: 01/30/24   Primary Wound Type: Venous Ulcer  Location: Leg  Wound Location Orientation: Left;Anterior      Assessments 1/30/2024  8:56 AM   Wound Image     Site Assessment Pink;Red;Fibrinous   Cindi-Wound Assessment Intact   Non-staged Wound Description Full thickness   Wound Length (cm) 0.1 cm   Wound Width (cm) 1.1 cm   Wound Surface Area (cm^2) 0.11 cm^2   Wound Depth (cm) 0.1 cm   Wound Volume (cm^3) 0.011 cm^3   Wound Bed Granulation (%) 90 %   Drainage Description Serosanguineous   Drainage Amount Small       No associated orders.          Debridement   Wound 05/23/22  Venous Ulcer Leg Left;Lateral    Consent obtained? verbal  Consent given by: patient  Performed by: physician and resident  Debridement type: surgical  Level of debridement: subcutaneous tissue  Pain control: lidocaine 2%  Post-debridement measurements  Length (cm): 6  Width (cm): 3.5  Depth (cm): 0.3  Percent debrided: 100%  Surface Area (cm^2): 21  Area debrided (cm^2): 21  Volume (cm^3): 6.3  Tissue and other material debrided: subcutaneous tissue  Devitalized tissue debrided: biofilm, fibrin and slough  Instrument(s) utilized: nippers and curette  Bleeding: small  Hemostasis obtained with: not applicable  Response to treatment: procedure was tolerated well    Debridement   Wound 05/23/22 Venous Ulcer Leg Left;Anterior;Lateral    Consent obtained? verbal  Consent given by: patient  Performed by: physician and resident  Debridement type: surgical  Level of debridement: subcutaneous tissue  Pain control: lidocaine 2%  Post-debridement measurements  Length (cm): 3.9  Width (cm): 0.7  Depth (cm): 0.6  Percent debrided: 100%  Surface Area (cm^2): 2.73  Area debrided (cm^2): 2.73  Volume (cm^3): 1.64  Tissue and other material debrided: hypergranulation and subcutaneous tissue  Devitalized tissue debrided: biofilm, fibrin and slough  Instrument(s) utilized: curette  Bleeding: small  Hemostasis obtained with: not applicable  Response to treatment: procedure was tolerated well    Debridement   Wound 01/29/24 Venous Ulcer Leg Left;Medial    Consent obtained? verbal  Consent given by: patient  Performed by: physician  Debridement type: surgical  Level of debridement: subcutaneous tissue  Pain control: lidocaine 2%  Post-debridement measurements  Length (cm): 0.6  Width (cm): 0.7  Depth (cm): 0.1  Percent debrided: 100%  Surface Area (cm^2): 0.42  Area debrided (cm^2): 0.42  Volume (cm^3): 0.04  Tissue and other material debrided: epidermis and subcutaneous tissue  Devitalized tissue debrided: biofilm, fibrin and  slough  Comment regarding debrided tissue: Loose edges  Instrument(s) utilized: nippers and curette  Bleeding: small  Hemostasis obtained with: not applicable  Response to treatment: procedure was tolerated well    Debridement   Wound 01/29/24 Venous Ulcer Leg Left;Proximal;Lateral    Consent obtained? verbal  Consent given by: patient  Performed by: physician and resident  Debridement type: surgical  Level of debridement: subcutaneous tissue  Pain control: lidocaine 2%  Post-debridement measurements  Length (cm): 2.6  Width (cm): 3.7  Depth (cm): 0.1  Percent debrided: 100%  Surface Area (cm^2): 9.62  Area debrided (cm^2): 9.62  Volume (cm^3): 0.96  Tissue and other material debrided: hypergranulation and subcutaneous tissue  Devitalized tissue debrided: fibrin and slough  Instrument(s) utilized: curette  Bleeding: small  Hemostasis obtained with: not applicable  Response to treatment: procedure was tolerated well    Debridement   Wound 01/30/24 Venous Ulcer Leg Left;Anterior    Consent obtained? verbal  Consent given by: patient  Performed by: physician  Debridement type: surgical  Level of debridement: subcutaneous tissue  Pain control: lidocaine 2%  Post-debridement measurements  Length (cm): 0.2  Width (cm): 1.1  Depth (cm): 0.1  Percent debrided: 100%  Surface Area (cm^2): 0.22  Area debrided (cm^2): 0.22  Volume (cm^3): 0.02  Tissue and other material debrided: subcutaneous tissue  Devitalized tissue debrided: fibrin and slough  Instrument(s) utilized: curette  Bleeding: small  Response to treatment: procedure was tolerated well        Assessment/Plan   Multiple LLE ulcers as noted. Hydrofera blue has improved the base. Change dressing to fibracol and dry dressing change MWF. Continue the double tubigrip for compression. Elevate AMAP. Follow up 3 weeks.      VISIT ORDERS:  Vascular Study Required: n  Labs Required: n  Radiology Imaging Required: n  Consultation Required: n  Rx Provided:   Changes to Plan of  Care: y      NEW ORDERS:  Wash: Michell  Irrigate/Soak:  Skin Care:  Dressing:  fibracol and dry dressing  Compression:  double tubigrip   Offloading:     DISCHARGE PLANNING:    Follow Up: 3 weeks continue [facility]  Nurse Visit: prn    General Instructions:  Center RN to apply EMLA/Lidocaine 1% jelly/LMX  topically to wound(s) prior to debridement by physician.  Center RN my see patient as a nurse consult in my absence.      RN Post Procedure Note:        HCC/ECF/Assisted Living  Agency/Facility: [facility]  days/week: 3  Contacted Regarding Changes: Yes

## 2024-01-31 ENCOUNTER — HOME CARE VISIT (OUTPATIENT)
Dept: HOME HEALTH SERVICES | Facility: HOME HEALTH | Age: 59
End: 2024-01-31
Payer: MEDICARE

## 2024-01-31 VITALS
RESPIRATION RATE: 18 BRPM | HEART RATE: 98 BPM | TEMPERATURE: 97.5 F | SYSTOLIC BLOOD PRESSURE: 104 MMHG | DIASTOLIC BLOOD PRESSURE: 66 MMHG

## 2024-01-31 PROCEDURE — 1090000001 HH PPS REVENUE CREDIT

## 2024-01-31 PROCEDURE — G0300 HHS/HOSPICE OF LPN EA 15 MIN: HCPCS | Mod: HHH

## 2024-01-31 PROCEDURE — 1090000002 HH PPS REVENUE DEBIT

## 2024-01-31 ASSESSMENT — ENCOUNTER SYMPTOMS
LOWEST PAIN SEVERITY IN PAST 24 HOURS: 4/10
CHANGE IN APPETITE: UNCHANGED
PAIN LOCATION - PAIN FREQUENCY: FREQUENT
PAIN SEVERITY GOAL: 0/10
PAIN: 1
PAIN LOCATION - PAIN SEVERITY: 6/10
PAIN LOCATION: LEFT FOOT
PAIN LOCATION - RELIEVING FACTORS: MEDICATION
PERSON REPORTING PAIN: PATIENT
APPETITE LEVEL: GOOD
HIGHEST PAIN SEVERITY IN PAST 24 HOURS: 8/10
SUBJECTIVE PAIN PROGRESSION: UNCHANGED
LAST BOWEL MOVEMENT: 66870
PAIN LOCATION - PAIN QUALITY: ACHING

## 2024-02-01 PROCEDURE — 1090000002 HH PPS REVENUE DEBIT

## 2024-02-01 PROCEDURE — 1090000001 HH PPS REVENUE CREDIT

## 2024-02-02 ENCOUNTER — HOME CARE VISIT (OUTPATIENT)
Dept: HOME HEALTH SERVICES | Facility: HOME HEALTH | Age: 59
End: 2024-02-02
Payer: MEDICARE

## 2024-02-02 PROCEDURE — G0300 HHS/HOSPICE OF LPN EA 15 MIN: HCPCS | Mod: HHH

## 2024-02-02 PROCEDURE — 1090000002 HH PPS REVENUE DEBIT

## 2024-02-02 PROCEDURE — 1090000001 HH PPS REVENUE CREDIT

## 2024-02-02 ASSESSMENT — ENCOUNTER SYMPTOMS
SUBJECTIVE PAIN PROGRESSION: UNCHANGED
APPETITE LEVEL: GOOD
HIGHEST PAIN SEVERITY IN PAST 24 HOURS: 6/10
PAIN SEVERITY GOAL: 0/10
PAIN: 1
OCCASIONAL FEELINGS OF UNSTEADINESS: 0
CHANGE IN APPETITE: UNCHANGED
LOWEST PAIN SEVERITY IN PAST 24 HOURS: 6/10

## 2024-02-03 PROCEDURE — 1090000002 HH PPS REVENUE DEBIT

## 2024-02-03 PROCEDURE — 1090000001 HH PPS REVENUE CREDIT

## 2024-02-04 PROCEDURE — 1090000001 HH PPS REVENUE CREDIT

## 2024-02-04 PROCEDURE — 1090000002 HH PPS REVENUE DEBIT

## 2024-02-05 ENCOUNTER — HOME CARE VISIT (OUTPATIENT)
Dept: HOME HEALTH SERVICES | Facility: HOME HEALTH | Age: 59
End: 2024-02-05
Payer: MEDICARE

## 2024-02-05 VITALS — HEART RATE: 104 BPM | TEMPERATURE: 97.5 F

## 2024-02-05 PROCEDURE — 1090000001 HH PPS REVENUE CREDIT

## 2024-02-05 PROCEDURE — 1090000002 HH PPS REVENUE DEBIT

## 2024-02-05 PROCEDURE — G0300 HHS/HOSPICE OF LPN EA 15 MIN: HCPCS | Mod: HHH

## 2024-02-05 ASSESSMENT — ENCOUNTER SYMPTOMS
CHANGE IN APPETITE: UNCHANGED
OCCASIONAL FEELINGS OF UNSTEADINESS: 0
PAIN: 1
PERSON REPORTING PAIN: PATIENT
LOWEST PAIN SEVERITY IN PAST 24 HOURS: 6/10
APPETITE LEVEL: GOOD
SUBJECTIVE PAIN PROGRESSION: UNCHANGED
PAIN SEVERITY GOAL: 0/10
HIGHEST PAIN SEVERITY IN PAST 24 HOURS: 6/10

## 2024-02-06 PROCEDURE — 1090000002 HH PPS REVENUE DEBIT

## 2024-02-06 PROCEDURE — 1090000001 HH PPS REVENUE CREDIT

## 2024-02-07 ENCOUNTER — HOME CARE VISIT (OUTPATIENT)
Dept: HOME HEALTH SERVICES | Facility: HOME HEALTH | Age: 59
End: 2024-02-07
Payer: MEDICARE

## 2024-02-07 PROCEDURE — 1090000002 HH PPS REVENUE DEBIT

## 2024-02-07 PROCEDURE — 1090000001 HH PPS REVENUE CREDIT

## 2024-02-07 PROCEDURE — G0300 HHS/HOSPICE OF LPN EA 15 MIN: HCPCS | Mod: HHH

## 2024-02-07 ASSESSMENT — ENCOUNTER SYMPTOMS
PAIN: 1
LOWEST PAIN SEVERITY IN PAST 24 HOURS: 6/10
SUBJECTIVE PAIN PROGRESSION: UNCHANGED
PAIN SEVERITY GOAL: 0/10
CHANGE IN APPETITE: UNCHANGED
HIGHEST PAIN SEVERITY IN PAST 24 HOURS: 6/10
APPETITE LEVEL: GOOD
OCCASIONAL FEELINGS OF UNSTEADINESS: 0

## 2024-02-08 PROCEDURE — 1090000001 HH PPS REVENUE CREDIT

## 2024-02-08 PROCEDURE — 1090000002 HH PPS REVENUE DEBIT

## 2024-02-09 ENCOUNTER — HOME CARE VISIT (OUTPATIENT)
Dept: HOME HEALTH SERVICES | Facility: HOME HEALTH | Age: 59
End: 2024-02-09
Payer: MEDICARE

## 2024-02-09 VITALS
RESPIRATION RATE: 18 BRPM | DIASTOLIC BLOOD PRESSURE: 68 MMHG | OXYGEN SATURATION: 97 % | TEMPERATURE: 98.6 F | SYSTOLIC BLOOD PRESSURE: 128 MMHG

## 2024-02-09 PROCEDURE — 1090000002 HH PPS REVENUE DEBIT

## 2024-02-09 PROCEDURE — G0299 HHS/HOSPICE OF RN EA 15 MIN: HCPCS | Mod: HHH

## 2024-02-09 PROCEDURE — 1090000001 HH PPS REVENUE CREDIT

## 2024-02-09 ASSESSMENT — ENCOUNTER SYMPTOMS
CHANGE IN APPETITE: UNCHANGED
APPETITE LEVEL: GOOD
PAIN: 1
PAIN LOCATION - PAIN SEVERITY: 7/10
PAIN LOCATION: LEFT LEG
LIMITED RANGE OF MOTION: 1
PERSON REPORTING PAIN: PATIENT

## 2024-02-09 ASSESSMENT — ACTIVITIES OF DAILY LIVING (ADL)
OASIS_M1830: 01
ENTERING_EXITING_HOME: SUPERVISION

## 2024-02-09 NOTE — HOME HEALTH
RN recertification visit. Continued skilled need for wound care. Patient unable to reach or visualize wound, no caregiver available. VSS. Baseline pain level. Medications Reviewed. No changes. No falls reported.      Wound care performed. Orders updated in care plan. Supplies ordered non bordered foam 6x6. Pictures and measurements uploaded.    SN to follow for wound care.

## 2024-02-10 PROCEDURE — 1090000001 HH PPS REVENUE CREDIT

## 2024-02-10 PROCEDURE — 1090000002 HH PPS REVENUE DEBIT

## 2024-02-11 PROCEDURE — 1090000002 HH PPS REVENUE DEBIT

## 2024-02-11 PROCEDURE — 1090000001 HH PPS REVENUE CREDIT

## 2024-02-12 ENCOUNTER — HOME CARE VISIT (OUTPATIENT)
Dept: HOME HEALTH SERVICES | Facility: HOME HEALTH | Age: 59
End: 2024-02-12
Payer: MEDICARE

## 2024-02-12 VITALS — SYSTOLIC BLOOD PRESSURE: 130 MMHG | HEART RATE: 92 BPM | DIASTOLIC BLOOD PRESSURE: 80 MMHG | TEMPERATURE: 97.6 F

## 2024-02-12 PROCEDURE — G0300 HHS/HOSPICE OF LPN EA 15 MIN: HCPCS | Mod: HHH

## 2024-02-12 PROCEDURE — 1090000002 HH PPS REVENUE DEBIT

## 2024-02-12 PROCEDURE — 1090000001 HH PPS REVENUE CREDIT

## 2024-02-12 PROCEDURE — 400014 HH F/U

## 2024-02-12 ASSESSMENT — ENCOUNTER SYMPTOMS
SUBJECTIVE PAIN PROGRESSION: UNCHANGED
LOWEST PAIN SEVERITY IN PAST 24 HOURS: 6/10
APPETITE LEVEL: GOOD
OCCASIONAL FEELINGS OF UNSTEADINESS: 0
HIGHEST PAIN SEVERITY IN PAST 24 HOURS: 6/10
CHANGE IN APPETITE: UNCHANGED
PERSON REPORTING PAIN: PATIENT
PAIN: 1
PAIN SEVERITY GOAL: 0/10

## 2024-02-13 PROCEDURE — 1090000002 HH PPS REVENUE DEBIT

## 2024-02-13 PROCEDURE — 1090000001 HH PPS REVENUE CREDIT

## 2024-02-14 ENCOUNTER — HOME CARE VISIT (OUTPATIENT)
Dept: HOME HEALTH SERVICES | Facility: HOME HEALTH | Age: 59
End: 2024-02-14
Payer: MEDICARE

## 2024-02-14 VITALS
DIASTOLIC BLOOD PRESSURE: 70 MMHG | RESPIRATION RATE: 16 BRPM | TEMPERATURE: 97.5 F | SYSTOLIC BLOOD PRESSURE: 130 MMHG | HEART RATE: 89 BPM

## 2024-02-14 PROCEDURE — 1090000002 HH PPS REVENUE DEBIT

## 2024-02-14 PROCEDURE — 1090000001 HH PPS REVENUE CREDIT

## 2024-02-14 PROCEDURE — G0300 HHS/HOSPICE OF LPN EA 15 MIN: HCPCS | Mod: HHH

## 2024-02-15 PROCEDURE — 1090000002 HH PPS REVENUE DEBIT

## 2024-02-15 PROCEDURE — 1090000001 HH PPS REVENUE CREDIT

## 2024-02-16 ENCOUNTER — HOME CARE VISIT (OUTPATIENT)
Dept: HOME HEALTH SERVICES | Facility: HOME HEALTH | Age: 59
End: 2024-02-16
Payer: MEDICARE

## 2024-02-16 VITALS
HEART RATE: 74 BPM | RESPIRATION RATE: 17 BRPM | TEMPERATURE: 98.2 F | OXYGEN SATURATION: 97 % | SYSTOLIC BLOOD PRESSURE: 134 MMHG | DIASTOLIC BLOOD PRESSURE: 72 MMHG

## 2024-02-16 PROCEDURE — 1090000001 HH PPS REVENUE CREDIT

## 2024-02-16 PROCEDURE — 1090000002 HH PPS REVENUE DEBIT

## 2024-02-16 PROCEDURE — G0300 HHS/HOSPICE OF LPN EA 15 MIN: HCPCS | Mod: HHH

## 2024-02-17 PROCEDURE — 1090000001 HH PPS REVENUE CREDIT

## 2024-02-17 PROCEDURE — 1090000002 HH PPS REVENUE DEBIT

## 2024-02-18 PROCEDURE — 1090000001 HH PPS REVENUE CREDIT

## 2024-02-18 PROCEDURE — 1090000002 HH PPS REVENUE DEBIT

## 2024-02-18 ASSESSMENT — ENCOUNTER SYMPTOMS
APPETITE LEVEL: GOOD
CHANGE IN APPETITE: UNCHANGED

## 2024-02-19 ENCOUNTER — HOME CARE VISIT (OUTPATIENT)
Dept: HOME HEALTH SERVICES | Facility: HOME HEALTH | Age: 59
End: 2024-02-19
Payer: MEDICARE

## 2024-02-19 VITALS — DIASTOLIC BLOOD PRESSURE: 78 MMHG | HEART RATE: 91 BPM | SYSTOLIC BLOOD PRESSURE: 134 MMHG | TEMPERATURE: 98 F

## 2024-02-19 PROCEDURE — G0300 HHS/HOSPICE OF LPN EA 15 MIN: HCPCS | Mod: HHH

## 2024-02-19 PROCEDURE — 1090000002 HH PPS REVENUE DEBIT

## 2024-02-19 PROCEDURE — 1090000001 HH PPS REVENUE CREDIT

## 2024-02-19 ASSESSMENT — ENCOUNTER SYMPTOMS
APPETITE LEVEL: GOOD
HIGHEST PAIN SEVERITY IN PAST 24 HOURS: 6/10
LAST BOWEL MOVEMENT: 66889
OCCASIONAL FEELINGS OF UNSTEADINESS: 0
PAIN SEVERITY GOAL: 0/10
SUBJECTIVE PAIN PROGRESSION: UNCHANGED
CONSTIPATION: 1
PAIN: 1
LOWEST PAIN SEVERITY IN PAST 24 HOURS: 6/10
CHANGE IN APPETITE: UNCHANGED

## 2024-02-20 ENCOUNTER — OFFICE VISIT (OUTPATIENT)
Dept: WOUND CARE | Facility: HOSPITAL | Age: 59
End: 2024-02-20
Payer: MEDICARE

## 2024-02-20 VITALS
BODY MASS INDEX: 51.53 KG/M2 | RESPIRATION RATE: 18 BRPM | HEIGHT: 62 IN | SYSTOLIC BLOOD PRESSURE: 154 MMHG | DIASTOLIC BLOOD PRESSURE: 78 MMHG | WEIGHT: 280 LBS | HEART RATE: 90 BPM

## 2024-02-20 DIAGNOSIS — I89.0 LYMPHEDEMA: ICD-10-CM

## 2024-02-20 DIAGNOSIS — L97.221 CHRONIC ULCER OF LEFT CALF LIMITED TO BREAKDOWN OF SKIN (MULTI): Primary | ICD-10-CM

## 2024-02-20 DIAGNOSIS — I87.2 VENOUS INSUFFICIENCY: ICD-10-CM

## 2024-02-20 PROCEDURE — 1090000002 HH PPS REVENUE DEBIT

## 2024-02-20 PROCEDURE — 11042 DBRDMT SUBQ TIS 1ST 20SQCM/<: CPT | Performed by: INTERNAL MEDICINE

## 2024-02-20 PROCEDURE — 3008F BODY MASS INDEX DOCD: CPT | Performed by: INTERNAL MEDICINE

## 2024-02-20 PROCEDURE — 11045 DBRDMT SUBQ TISS EACH ADDL: CPT | Performed by: INTERNAL MEDICINE

## 2024-02-20 PROCEDURE — 1036F TOBACCO NON-USER: CPT | Performed by: INTERNAL MEDICINE

## 2024-02-20 PROCEDURE — 1090000001 HH PPS REVENUE CREDIT

## 2024-02-20 RX ORDER — NYSTATIN 100000 [USP'U]/G
1 POWDER TOPICAL 2 TIMES DAILY
Qty: 60 G | Refills: 1 | Status: SHIPPED | OUTPATIENT
Start: 2024-02-20 | End: 2025-02-19

## 2024-02-20 NOTE — PATIENT INSTRUCTIONS
Assessment/Plan   LLE ulcers in the setting of lymphedema and prev surgery. Discussed interdry or simila material into the creases - use with nystatin powder. Change dressing to iodoflex and mepliex. Change MWF.     VISIT ORDERS:  Vascular Study Required: n  Labs Required: n  Radiology Imaging Required: n  Consultation Required: n  Rx Provided:   Changes to Plan of Care: y      NEW ORDERS:  Wash: fredy wash  Irrigate/Soak:  Skin Care: nystatin powder to the skin folds  Dressing: interdry or similar to the folds; iodoflex and mepilex to the ulcer change MWF  Compression: double tubigrip to lower calf. Spandage and tubigrip to the thigh  Offloading:     DISCHARGE PLANNING:    Follow Up: 2 weeks  Nurse Visit: prn

## 2024-02-20 NOTE — PROGRESS NOTES
REFERRAL REQUESTED BY:     Ramesh Rothman DO PCP   LAST SEEN:  01/30/2024    Patient ID: Patricia Mckeon is a 58 y.o. female     HPI:   Here for follow up left calf ulcers. Has more swelling. Does not have a device to help with this.     CURRENT DRESSING:  Who is performing the dressing change:  [facility]  Dressing applied: Fibracol and mepilex DSD; bribracol and 4x4 to the crease  Dressing Frequency: MWF    EDEMA MANAGEMENT:  Compression:  Right: Double Tubigrip CALF 55.0 CM   Left: Double Tubigrip   CALF 88.0 CM     Other Modality  Sleeping in Bed: yes  Elevating FOB:  yes    Offloading/Pressure Relief  Activity Level:  ad bradly  Protection Devices:  Darco shoe LLE    Offloading/Pressure Relief Effective?     ROS:      Objective     VITALS:  There were no vitals taken for this visit.      Physical Exam    ARRIVAL:  Ambulating and Walker  TRANSFER:  None    PSYCHIATRIC:  Oriented to person, place and time: A & O x 3    CONSTITUTIONAL:    Appearance:  Well Groomed    SKIN:   chronic scarring from surgery     PULSES:     EXTREMITY TEMPERATURE:    RIGHT: normal  LEFT: normal    EDEMA: severe    WOUND ASSESSMENT:    Wound 01/29/24 Venous Ulcer Leg Left;Medial (Active)   Date First Assessed: 01/29/24   Hand Hygiene Completed: Yes  Primary Wound Type: Venous Ulcer  Location: Leg  Wound Location Orientation: Left;Medial      Assessments 1/30/2024  8:48 AM 2/20/2024  9:20 AM   Wound Image       Site Assessment Pink;Red;Fibrinous --   Cindi-Wound Assessment Intact --   Non-staged Wound Description Full thickness --   Shape -- HEALED   Wound Length (cm) 0.9 cm --   Wound Width (cm) 0.7 cm --   Wound Surface Area (cm^2) 0.63 cm^2 --   Wound Depth (cm) 0.1 cm --   Wound Volume (cm^3) 0.063 cm^3 --   Wound Bed Granulation (%) 50 % --   Drainage Description Serosanguineous --   Drainage Amount Small --       Inactive Orders   Date Order Priority Status Authorizing Provider   01/30/24 0957 Debridement Routine  Completed Adelita Martin MD       Wound 01/30/24 Venous Ulcer Leg Left;Dorsal (Active)   Date First Assessed: 01/30/24   Hand Hygiene Completed: Yes  Primary Wound Type: Venous Ulcer  Location: Leg  Wound Location Orientation: Left;Dorsal      Assessments 1/30/2024  8:51 AM 2/20/2024  9:18 AM   Wound Image       Site Assessment Pink;Red;Fibrinous --   Cindi-Wound Assessment Intact --   Non-staged Wound Description Full thickness --   Shape -- HEALED   Wound Length (cm) 0.3 cm --   Wound Width (cm) 0.5 cm --   Wound Surface Area (cm^2) 0.15 cm^2 --   Wound Depth (cm) 0.2 cm --   Wound Volume (cm^3) 0.03 cm^3 --   Wound Bed Granulation (%) 50 % --   Drainage Description Serosanguineous --   Drainage Amount Small --       No associated orders.       Wound 01/30/24 Venous Ulcer Leg Left;Anterior (Active)   Date First Assessed: 01/30/24   Primary Wound Type: Venous Ulcer  Location: Leg  Wound Location Orientation: Left;Anterior      Assessments 1/30/2024  8:56 AM 2/20/2024  9:20 AM   Wound Image       Site Assessment Pink;Red;Fibrinous --   Cindi-Wound Assessment Intact --   Non-staged Wound Description Full thickness --   Shape -- HEALED   Wound Length (cm) 0.1 cm --   Wound Width (cm) 1.1 cm --   Wound Surface Area (cm^2) 0.11 cm^2 --   Wound Depth (cm) 0.1 cm --   Wound Volume (cm^3) 0.011 cm^3 --   Wound Bed Granulation (%) 90 % --   Drainage Description Serosanguineous --   Drainage Amount Small --       Inactive Orders   Date Order Priority Status Authorizing Provider   01/30/24 0959 Debridement Routine Completed Adelita Martin MD       Wound 01/29/24 Venous Ulcer Leg Left;Proximal;Lateral (Active)   Date First Assessed: 01/29/24   Primary Wound Type: Venous Ulcer  Location: Leg  Wound Location Orientation: Left;Proximal;Lateral      Assessments 2/20/2024  9:24 AM   Wound Image     Site Assessment Pink;Red;Fibrinous   Cindi-Wound Assessment Intact   Non-staged Wound Description Full thickness   Wound Length (cm)  1.5 cm   Wound Width (cm) 0.4 cm   Wound Surface Area (cm^2) 0.6 cm^2   Wound Depth (cm) 0.1 cm   Wound Volume (cm^3) 0.06 cm^3   Wound Bed Granulation (%) 10 %   Drainage Description Serosanguineous   Drainage Amount Moderate       No associated orders.       Wound 01/29/24 Venous Ulcer Leg Left;Anterior;Lateral (Active)   Date First Assessed: 01/29/24   Primary Wound Type: Venous Ulcer  Location: Leg  Wound Location Orientation: Left;Anterior;Lateral      Assessments 2/20/2024  9:28 AM   Wound Image     Site Assessment Pink;Red;Fibrinous   Cindi-Wound Assessment Intact   Non-staged Wound Description Full thickness   Wound Length (cm) 1 cm   Wound Width (cm) 2 cm   Wound Surface Area (cm^2) 2 cm^2   Wound Depth (cm) 0.1 cm   Wound Volume (cm^3) 0.2 cm^3   Wound Bed Granulation (%) 10 %   Drainage Description Serosanguineous   Drainage Amount Small       No associated orders.       Wound 01/29/24 Venous Ulcer Leg Left;Lateral (Active)   Date First Assessed: 01/29/24   Primary Wound Type: Venous Ulcer  Location: Leg  Wound Location Orientation: Left;Lateral      Assessments 2/20/2024  9:22 AM   Wound Image     Site Assessment Pink;Red;Fibrinous   Cindi-Wound Assessment Intact   Non-staged Wound Description Full thickness   Wound Length (cm) 3 cm   Wound Width (cm) 5.5 cm   Wound Surface Area (cm^2) 16.5 cm^2   Wound Depth (cm) 0.2 cm   Wound Volume (cm^3) 3.3 cm^3   Wound Bed Granulation (%) 10 %   Drainage Description Serosanguineous   Drainage Amount Moderate       No associated orders.          Debridement   Wound 01/29/24 Venous Ulcer Leg Left;Lateral    Consent obtained? verbal  Consent given by: patient  Performed by: physician and resident  Debridement type: surgical  Level of debridement: subcutaneous tissue  Pain control: lidocaine 2%  Post-debridement measurements  Length (cm): 3.5  Width (cm): 5.5  Depth (cm): 0.3  Percent debrided: 100%  Surface Area (cm^2): 19.25  Area debrided (cm^2): 19.25  Volume  (cm^3): 5.78  Tissue and other material debrided: subcutaneous tissue  Devitalized tissue debrided: fibrin, necrotic debris and slough  Instrument(s) utilized: curette and nippers  Bleeding: small  Hemostasis obtained with: not applicable  Response to treatment: procedure was tolerated well    Debridement   Wound 01/29/24 Venous Ulcer Leg Left;Anterior;Lateral    Consent obtained? verbal  Consent given by: patient  Performed by: physician and resident  Debridement type: surgical  Level of debridement: subcutaneous tissue  Pain control: lidocaine 2%  Post-debridement measurements  Length (cm): 1  Width (cm): 2  Depth (cm): 0.2  Percent debrided: 100%  Surface Area (cm^2): 2  Area debrided (cm^2): 2  Volume (cm^3): 0.4  Tissue and other material debrided: hypergranulation and subcutaneous tissue  Devitalized tissue debrided: fibrin and slough  Instrument(s) utilized: curette  Bleeding: small  Hemostasis obtained with: not applicable  Response to treatment: procedure was tolerated well    Debridement   Wound 01/29/24 Venous Ulcer Leg Left;Proximal;Lateral    Consent obtained? verbal  Consent given by: patient  Performed by: physician  Debridement type: surgical  Level of debridement: subcutaneous tissue  Pain control: lidocaine 2%  Post-debridement measurements  Length (cm): 1.5  Width (cm): 0.4  Depth (cm): 0.3  Percent debrided: 100%  Surface Area (cm^2): 0.6  Area debrided (cm^2): 0.6  Volume (cm^3): 0.18  Tissue and other material debrided: hypergranulation and subcutaneous tissue  Devitalized tissue debrided: fibrin and slough  Instrument(s) utilized: curette  Bleeding: small  Hemostasis obtained with: not applicable  Response to treatment: procedure was tolerated well        Assessment/Plan   LLE ulcers in the setting of lymphedema and prev surgery. Discussed interdry or simila material into the creases - use with nystatin powder. Change dressing to iodoflex and mepliex. Change MWF.     VISIT ORDERS:  Vascular  Study Required: n  Labs Required: n  Radiology Imaging Required: n  Consultation Required: n  Rx Provided:   Changes to Plan of Care: y      NEW ORDERS:  Wash: fredy wash  Irrigate/Soak:  Skin Care: nystatin powder to the skin folds  Dressing: interdry or similar to the folds; iodoflex and mepilex to the ulcer change MWF  Compression: double tubigrip to lower calf. Spandage and tubigrip to the thigh  Offloading:     DISCHARGE PLANNING:    Follow Up: 2 weeks  Nurse Visit: prn    General Instructions:  Center RN to apply EMLA/Lidocaine 1% jelly/LMX  topically to wound(s) prior to debridement by physician.  Center RN my see patient as a nurse consult in my absence.      RN Post Procedure Note:  LLE rewashed with soap and water, rinsed and patted dry. Eucerin applied to intact skin. Iodorsorb applied to wound base and covered with mepilex and 4x4s. Mepilex transfer applied to skin folds. Edema wear applied to left thigh, double tubigrib to lower calf, and RLE; double tubigrip for compression. Patient tolerated well. Orders faxed to home care agency. SALAS Parra RN      HCC/ECF/Assisted Living  Agency/Facility: [facility]  days/week: 3   Contacted Regarding Changes: Y

## 2024-02-21 ENCOUNTER — HOME CARE VISIT (OUTPATIENT)
Dept: HOME HEALTH SERVICES | Facility: HOME HEALTH | Age: 59
End: 2024-02-21
Payer: MEDICARE

## 2024-02-21 VITALS — DIASTOLIC BLOOD PRESSURE: 78 MMHG | TEMPERATURE: 97.1 F | HEART RATE: 77 BPM | SYSTOLIC BLOOD PRESSURE: 132 MMHG

## 2024-02-21 PROCEDURE — 1090000002 HH PPS REVENUE DEBIT

## 2024-02-21 PROCEDURE — G0300 HHS/HOSPICE OF LPN EA 15 MIN: HCPCS | Mod: HHH

## 2024-02-21 PROCEDURE — 1090000001 HH PPS REVENUE CREDIT

## 2024-02-21 ASSESSMENT — ENCOUNTER SYMPTOMS
CHANGE IN APPETITE: UNCHANGED
SUBJECTIVE PAIN PROGRESSION: UNCHANGED
LOWEST PAIN SEVERITY IN PAST 24 HOURS: 6/10
PAIN: 1
HIGHEST PAIN SEVERITY IN PAST 24 HOURS: 8/10
OCCASIONAL FEELINGS OF UNSTEADINESS: 0
PAIN SEVERITY GOAL: 0/10
PERSON REPORTING PAIN: PATIENT
APPETITE LEVEL: GOOD

## 2024-02-22 PROCEDURE — 1090000001 HH PPS REVENUE CREDIT

## 2024-02-22 PROCEDURE — 1090000002 HH PPS REVENUE DEBIT

## 2024-02-22 PROCEDURE — G0179 MD RECERTIFICATION HHA PT: HCPCS | Performed by: INTERNAL MEDICINE

## 2024-02-23 ENCOUNTER — HOME CARE VISIT (OUTPATIENT)
Dept: HOME HEALTH SERVICES | Facility: HOME HEALTH | Age: 59
End: 2024-02-23
Payer: MEDICARE

## 2024-02-23 PROCEDURE — 1090000002 HH PPS REVENUE DEBIT

## 2024-02-23 PROCEDURE — G0300 HHS/HOSPICE OF LPN EA 15 MIN: HCPCS | Mod: HHH

## 2024-02-23 PROCEDURE — 1090000001 HH PPS REVENUE CREDIT

## 2024-02-24 PROCEDURE — 1090000002 HH PPS REVENUE DEBIT

## 2024-02-24 PROCEDURE — 1090000001 HH PPS REVENUE CREDIT

## 2024-02-24 ASSESSMENT — ENCOUNTER SYMPTOMS
OCCASIONAL FEELINGS OF UNSTEADINESS: 0
DENIES PAIN: 1
APPETITE LEVEL: GOOD

## 2024-02-24 ASSESSMENT — PAIN SCALES - PAIN ASSESSMENT IN ADVANCED DEMENTIA (PAINAD)
FACIALEXPRESSION: 0 - SMILING OR INEXPRESSIVE.
FACIALEXPRESSION: 0
BODYLANGUAGE: 0
NEGVOCALIZATION: 0 - NONE.
BREATHING: 0
NEGVOCALIZATION: 0
BODYLANGUAGE: 0 - RELAXED.

## 2024-02-24 ASSESSMENT — ACTIVITIES OF DAILY LIVING (ADL): MONEY MANAGEMENT (EXPENSES/BILLS): INDEPENDENT

## 2024-02-25 PROCEDURE — 1090000001 HH PPS REVENUE CREDIT

## 2024-02-25 PROCEDURE — 1090000002 HH PPS REVENUE DEBIT

## 2024-02-25 ASSESSMENT — ENCOUNTER SYMPTOMS
APPETITE LEVEL: GOOD
SUBJECTIVE PAIN PROGRESSION: UNCHANGED
CHANGE IN APPETITE: UNCHANGED
HIGHEST PAIN SEVERITY IN PAST 24 HOURS: 6/10
LOWEST PAIN SEVERITY IN PAST 24 HOURS: 6/10
OCCASIONAL FEELINGS OF UNSTEADINESS: 0
LOWER EXTREMITY EDEMA: 1
PAIN SEVERITY GOAL: 0/10
PAIN: 1

## 2024-02-26 ENCOUNTER — HOME CARE VISIT (OUTPATIENT)
Dept: HOME HEALTH SERVICES | Facility: HOME HEALTH | Age: 59
End: 2024-02-26
Payer: MEDICARE

## 2024-02-26 PROCEDURE — 1090000002 HH PPS REVENUE DEBIT

## 2024-02-26 PROCEDURE — 1090000001 HH PPS REVENUE CREDIT

## 2024-02-26 PROCEDURE — G0300 HHS/HOSPICE OF LPN EA 15 MIN: HCPCS | Mod: HHH

## 2024-02-27 VITALS — SYSTOLIC BLOOD PRESSURE: 132 MMHG | HEART RATE: 88 BPM | DIASTOLIC BLOOD PRESSURE: 72 MMHG | TEMPERATURE: 97.4 F

## 2024-02-27 PROCEDURE — 1090000002 HH PPS REVENUE DEBIT

## 2024-02-27 PROCEDURE — 1090000001 HH PPS REVENUE CREDIT

## 2024-02-27 ASSESSMENT — ENCOUNTER SYMPTOMS
SUBJECTIVE PAIN PROGRESSION: UNCHANGED
HIGHEST PAIN SEVERITY IN PAST 24 HOURS: 6/10
PERSON REPORTING PAIN: PATIENT
APPETITE LEVEL: GOOD
PAIN SEVERITY GOAL: 0/10
OCCASIONAL FEELINGS OF UNSTEADINESS: 0
PAIN: 1
LOWEST PAIN SEVERITY IN PAST 24 HOURS: 6/10
CHANGE IN APPETITE: UNCHANGED

## 2024-02-28 ENCOUNTER — HOME CARE VISIT (OUTPATIENT)
Dept: HOME HEALTH SERVICES | Facility: HOME HEALTH | Age: 59
End: 2024-02-28
Payer: MEDICARE

## 2024-02-28 VITALS — SYSTOLIC BLOOD PRESSURE: 130 MMHG | DIASTOLIC BLOOD PRESSURE: 76 MMHG | HEART RATE: 101 BPM

## 2024-02-28 PROCEDURE — 1090000002 HH PPS REVENUE DEBIT

## 2024-02-28 PROCEDURE — 1090000001 HH PPS REVENUE CREDIT

## 2024-02-28 PROCEDURE — G0300 HHS/HOSPICE OF LPN EA 15 MIN: HCPCS | Mod: HHH

## 2024-02-28 ASSESSMENT — ENCOUNTER SYMPTOMS
PAIN SEVERITY GOAL: 0/10
PERSON REPORTING PAIN: PATIENT
CHANGE IN APPETITE: UNCHANGED
APPETITE LEVEL: GOOD
BOWEL PATTERN NORMAL: 1
OCCASIONAL FEELINGS OF UNSTEADINESS: 0
PAIN: 1
SUBJECTIVE PAIN PROGRESSION: UNCHANGED
STOOL FREQUENCY: DAILY
LAST BOWEL MOVEMENT: 66898
LOWEST PAIN SEVERITY IN PAST 24 HOURS: 6/10
HIGHEST PAIN SEVERITY IN PAST 24 HOURS: 6/10

## 2024-02-29 PROCEDURE — 1090000002 HH PPS REVENUE DEBIT

## 2024-02-29 PROCEDURE — 1090000001 HH PPS REVENUE CREDIT

## 2024-03-01 ENCOUNTER — HOME CARE VISIT (OUTPATIENT)
Dept: HOME HEALTH SERVICES | Facility: HOME HEALTH | Age: 59
End: 2024-03-01
Payer: MEDICARE

## 2024-03-01 PROCEDURE — 1090000002 HH PPS REVENUE DEBIT

## 2024-03-01 PROCEDURE — 1090000001 HH PPS REVENUE CREDIT

## 2024-03-01 PROCEDURE — G0300 HHS/HOSPICE OF LPN EA 15 MIN: HCPCS | Mod: HHH

## 2024-03-02 PROCEDURE — 1090000002 HH PPS REVENUE DEBIT

## 2024-03-02 PROCEDURE — 1090000001 HH PPS REVENUE CREDIT

## 2024-03-03 VITALS — TEMPERATURE: 98.1 F | SYSTOLIC BLOOD PRESSURE: 144 MMHG | HEART RATE: 74 BPM | DIASTOLIC BLOOD PRESSURE: 80 MMHG

## 2024-03-03 PROCEDURE — 1090000002 HH PPS REVENUE DEBIT

## 2024-03-03 PROCEDURE — 1090000001 HH PPS REVENUE CREDIT

## 2024-03-03 ASSESSMENT — ENCOUNTER SYMPTOMS
CHANGE IN APPETITE: UNCHANGED
LAST BOWEL MOVEMENT: 66899
APPETITE LEVEL: GOOD
MUSCLE WEAKNESS: 1
PAIN LOCATION: LEFT LEG
PERSON REPORTING PAIN: PATIENT
PAIN SEVERITY GOAL: 0/10
PAIN: 1
OCCASIONAL FEELINGS OF UNSTEADINESS: 0
SUBJECTIVE PAIN PROGRESSION: UNCHANGED
LOWEST PAIN SEVERITY IN PAST 24 HOURS: 6/10
HIGHEST PAIN SEVERITY IN PAST 24 HOURS: 6/10

## 2024-03-03 ASSESSMENT — ACTIVITIES OF DAILY LIVING (ADL): MONEY MANAGEMENT (EXPENSES/BILLS): INDEPENDENT

## 2024-03-04 ENCOUNTER — HOME CARE VISIT (OUTPATIENT)
Dept: HOME HEALTH SERVICES | Facility: HOME HEALTH | Age: 59
End: 2024-03-04
Payer: MEDICARE

## 2024-03-04 VITALS
OXYGEN SATURATION: 98 % | DIASTOLIC BLOOD PRESSURE: 78 MMHG | RESPIRATION RATE: 18 BRPM | HEART RATE: 89 BPM | TEMPERATURE: 98.7 F | SYSTOLIC BLOOD PRESSURE: 128 MMHG

## 2024-03-04 PROCEDURE — 1090000002 HH PPS REVENUE DEBIT

## 2024-03-04 PROCEDURE — G0299 HHS/HOSPICE OF RN EA 15 MIN: HCPCS | Mod: HHH

## 2024-03-04 PROCEDURE — 1090000001 HH PPS REVENUE CREDIT

## 2024-03-04 ASSESSMENT — ENCOUNTER SYMPTOMS
CHANGE IN APPETITE: UNCHANGED
DENIES PAIN: 1
APPETITE LEVEL: GOOD

## 2024-03-05 PROCEDURE — 1090000001 HH PPS REVENUE CREDIT

## 2024-03-05 PROCEDURE — 1090000002 HH PPS REVENUE DEBIT

## 2024-03-06 PROCEDURE — 1090000001 HH PPS REVENUE CREDIT

## 2024-03-06 PROCEDURE — 1090000002 HH PPS REVENUE DEBIT

## 2024-03-07 ENCOUNTER — HOME CARE VISIT (OUTPATIENT)
Dept: HOME HEALTH SERVICES | Facility: HOME HEALTH | Age: 59
End: 2024-03-07
Payer: MEDICARE

## 2024-03-07 PROCEDURE — 1090000002 HH PPS REVENUE DEBIT

## 2024-03-07 PROCEDURE — 1090000001 HH PPS REVENUE CREDIT

## 2024-03-08 ENCOUNTER — HOME CARE VISIT (OUTPATIENT)
Dept: HOME HEALTH SERVICES | Facility: HOME HEALTH | Age: 59
End: 2024-03-08
Payer: MEDICARE

## 2024-03-08 VITALS
HEART RATE: 100 BPM | RESPIRATION RATE: 18 BRPM | DIASTOLIC BLOOD PRESSURE: 80 MMHG | OXYGEN SATURATION: 96 % | TEMPERATURE: 97.4 F | SYSTOLIC BLOOD PRESSURE: 128 MMHG

## 2024-03-08 PROCEDURE — 1090000002 HH PPS REVENUE DEBIT

## 2024-03-08 PROCEDURE — G0299 HHS/HOSPICE OF RN EA 15 MIN: HCPCS | Mod: HHH

## 2024-03-08 PROCEDURE — 1090000001 HH PPS REVENUE CREDIT

## 2024-03-08 ASSESSMENT — ENCOUNTER SYMPTOMS
PAIN LOCATION: LEFT LEG
APPETITE LEVEL: GOOD
PAIN LOCATION - PAIN SEVERITY: 6/10
CHANGE IN APPETITE: UNCHANGED
LIMITED RANGE OF MOTION: 1
PERSON REPORTING PAIN: PATIENT
PAIN: 1

## 2024-03-08 NOTE — HOME HEALTH
RN follow up visit for wound care. VSS. Baseline pain. Medications reviewed, no changes. Wound care provided per orders. No supplies needed at this time. Pictures and measurements uploaded. Patient has wound care apt next week.    SN to follow for wound care
Oriented - self; Oriented - place; Oriented - time

## 2024-03-09 PROCEDURE — 1090000002 HH PPS REVENUE DEBIT

## 2024-03-09 PROCEDURE — 1090000001 HH PPS REVENUE CREDIT

## 2024-03-10 PROCEDURE — 1090000002 HH PPS REVENUE DEBIT

## 2024-03-10 PROCEDURE — 1090000001 HH PPS REVENUE CREDIT

## 2024-03-11 ENCOUNTER — HOME CARE VISIT (OUTPATIENT)
Dept: HOME HEALTH SERVICES | Facility: HOME HEALTH | Age: 59
End: 2024-03-11
Payer: MEDICARE

## 2024-03-11 ENCOUNTER — OFFICE VISIT (OUTPATIENT)
Dept: WOUND CARE | Facility: CLINIC | Age: 59
End: 2024-03-11
Payer: MEDICARE

## 2024-03-11 VITALS
SYSTOLIC BLOOD PRESSURE: 162 MMHG | BODY MASS INDEX: 51.91 KG/M2 | RESPIRATION RATE: 20 BRPM | WEIGHT: 293 LBS | HEIGHT: 63 IN | DIASTOLIC BLOOD PRESSURE: 78 MMHG | HEART RATE: 88 BPM

## 2024-03-11 DIAGNOSIS — L97.221 CHRONIC ULCER OF LEFT CALF LIMITED TO BREAKDOWN OF SKIN (MULTI): Primary | ICD-10-CM

## 2024-03-11 DIAGNOSIS — I89.0 LYMPHEDEMA: ICD-10-CM

## 2024-03-11 DIAGNOSIS — I87.2 VENOUS INSUFFICIENCY: ICD-10-CM

## 2024-03-11 PROCEDURE — 11042 DBRDMT SUBQ TIS 1ST 20SQCM/<: CPT | Performed by: INTERNAL MEDICINE

## 2024-03-11 PROCEDURE — 1036F TOBACCO NON-USER: CPT | Performed by: INTERNAL MEDICINE

## 2024-03-11 PROCEDURE — 1090000001 HH PPS REVENUE CREDIT

## 2024-03-11 PROCEDURE — 3008F BODY MASS INDEX DOCD: CPT | Performed by: INTERNAL MEDICINE

## 2024-03-11 PROCEDURE — 1090000002 HH PPS REVENUE DEBIT

## 2024-03-11 PROCEDURE — 11045 DBRDMT SUBQ TISS EACH ADDL: CPT | Performed by: INTERNAL MEDICINE

## 2024-03-11 ASSESSMENT — PATIENT HEALTH QUESTIONNAIRE - PHQ9
1. LITTLE INTEREST OR PLEASURE IN DOING THINGS: NOT AT ALL
SUM OF ALL RESPONSES TO PHQ9 QUESTIONS 1 AND 2: 0
2. FEELING DOWN, DEPRESSED OR HOPELESS: NOT AT ALL

## 2024-03-11 ASSESSMENT — PAIN SCALES - GENERAL: PAINLEVEL: 6

## 2024-03-11 NOTE — PATIENT INSTRUCTIONS
Assessment/Plan   LLE ulcers as noted - little improvement overall. Continue the iodosorb and mepilex. Continue the interdry. Continue the edemawear and tubigrip compression.  Follow up 3 weeks.    VISIT ORDERS:  Vascular Study Required: n  Labs Required: n  Radiology Imaging Required: n  Consultation Required: n  Rx Provided:   Changes to Plan of Care: n      NEW ORDERS:  Wash: fredy wash  Irrigate/Soak:  Skin Care: zinc surrounding  Dressing:  iodoflex and mepilex change  3 x a week  Compression:  edemawear and tubigrip  Offloading:     DISCHARGE PLANNING:    Follow Up: 3 weeks  Nurse Visit: prn

## 2024-03-11 NOTE — PROGRESS NOTES
"REFERRAL REQUESTED BY:  Ramesh Rothman DO PCP    LAST SEEN:  2/20/2024    Patient ID: Patricia Mckeon is a 58 y.o. female     HPI:   Here for follow up left calf ulcers. Missed the [facility] visit last week - states Tuesday they called at 10 pm for a visit. Wednesday - did not get to shower before -so refused the visit. Dressing changed on Friday.      CURRENT DRESSING:  Who is performing the dressing change:  [facility]  Dressing applied: Iodosorb, mepilex to ulcers and interdry to skin folds with nystatin powder  Dressing Frequency: 3x/week    EDEMA MANAGEMENT:  Compression:  Right: Double Tubigrip CALF 57.0cm   Left: Tubigrip/EdemaWear  CALF 80.5cm     Other Modality  Sleeping in Bed: yes  Elevating FOB:  yes    Offloading/Pressure Relief  Activity Level:  ad bradly  Protection Devices:  left foot surgical shoe    Offloading/Pressure Relief Effective?     ROS:      Objective     VITALS:  /78 (BP Location: Left arm, Patient Position: Sitting)   Pulse 88   Resp 20   Ht 1.6 m (5' 3\")   Wt (!) 168 kg (370 lb 14.4 oz)   BMI 65.70 kg/m²       Physical Exam    ARRIVAL:  Walker  TRANSFER:  1 assist    PSYCHIATRIC:  Oriented to person, place and time: A & O x 3    CONSTITUTIONAL:    Appearance:  Well Groomed    SKIN:   chronic skin changes, more erythema     PULSES:     EXTREMITY TEMPERATURE:    RIGHT: normal  LEFT: normal    EDEMA: severe    WOUND ASSESSMENT:    Wound 01/29/24 Venous Ulcer Leg Left;Lateral;Proximal (Active)   Final Assessment Date: (c)   Date First Assessed: 01/29/24   Hand Hygiene Completed: Yes  Primary Wound Type: Venous Ulcer  Location: Leg  Wound Location Orientation: Left;Lateral;Proximal  Wound Outcome: Other (Comment)      Assessments 1/30/2024  8:46 AM 3/11/2024  9:24 AM   Wound Image       Site Assessment Pink;Red;Fibrinous Red;Fibrinous   Cindi-Wound Assessment Intact Intact   Non-staged Wound Description Full thickness --   Shape -- reopened   Wound Length (cm) 2.6 cm 0.5 cm   Wound " Width (cm) 3.7 cm 1.2 cm   Wound Surface Area (cm^2) 9.62 cm^2 0.6 cm^2   Wound Depth (cm) 0.1 cm 0.1 cm   Wound Volume (cm^3) 0.962 cm^3 0.06 cm^3   Wound Healing % -- 94   Wound Bed Granulation (%) 10 % --   Drainage Description Serosanguineous Serosanguineous   Drainage Amount Small Small       Inactive Orders   Date Order Priority Status Authorizing Provider   01/30/24 0958 Debridement Routine Completed Adelita Martin MD       Wound 01/29/24 Venous Ulcer Leg Left;Anterior;Lateral (Active)   Date First Assessed: 01/29/24   Primary Wound Type: Venous Ulcer  Location: Leg  Wound Location Orientation: Left;Anterior;Lateral      Assessments 2/14/2024 10:15 AM 3/11/2024  9:23 AM   Wound Image      Site Assessment Sloughing Eschar;Fibrinous;Red   Cindi-Wound Assessment Intact Erythematous   Wound Length (cm) -- 6.4 cm   Wound Width (cm) -- 1.1 cm   Wound Surface Area (cm^2) -- 7.04 cm^2   Wound Depth (cm) -- 0.1 cm   Wound Volume (cm^3) -- 0.704 cm^3   Wound Healing % -- -252   State of Healing Non-healing --   Drainage Description Unable to assess Serosanguineous   Drainage Amount -- Small   Dressing Hydrogel --       Inactive Orders   Date Order Priority Status Authorizing Provider   02/20/24 1024 Debridement Routine Completed Adelita Martin MD       Wound 01/29/24 Venous Ulcer Leg Left;Lateral (Active)   Date First Assessed: 01/29/24   Primary Wound Type: Venous Ulcer  Location: Leg  Wound Location Orientation: Left;Lateral      Assessments 2/20/2024  9:22 AM 3/11/2024  9:25 AM   Wound Image       Site Assessment Pink;Red;Fibrinous Fibrinous;Red;Sloughing   Cindi-Wound Assessment Intact Intact;Erythematous   Non-staged Wound Description Full thickness --   Wound Length (cm) 3 cm 3.1 cm   Wound Width (cm) 5.5 cm 5.8 cm   Wound Surface Area (cm^2) 16.5 cm^2 17.98 cm^2   Wound Depth (cm) 0.2 cm 0.4 cm   Wound Volume (cm^3) 3.3 cm^3 7.192 cm^3   Wound Healing % -- -118   Wound Bed Granulation (%) 10 % --   Wound Bed  Slough (%) -- 25 %   Margins -- Epibole (Rolled edges)   Drainage Description Serosanguineous Serosanguineous;Green   Drainage Amount Moderate Moderate       Inactive Orders   Date Order Priority Status Authorizing Provider   02/20/24 1023 Debridement Routine Completed Adelita Martin MD       Wound 03/11/24 Moisture Associated Skin Damage Tibial Dorsal;Right (Active)   Date First Assessed: 03/11/24   Wound Approximate Age at First Assessment (Weeks): 1 weeks  Primary Wound Type: Moisture Associated Skin Damage  Location: Tibial  Wound Location Orientation: Dorsal;Right      Assessments 3/11/2024  9:27 AM   Wound Image     Shape denuded skin   Wound Length (cm) 8 cm   Wound Width (cm) 11.5 cm   Wound Surface Area (cm^2) 92 cm^2   Wound Depth (cm) 0 cm   Wound Volume (cm^3) 0 cm^3       No associated orders.          Debridement   Wound 01/29/24 Venous Ulcer Leg Left;Lateral;Proximal    Consent obtained? verbal  Consent given by: patient  Performed by: physician  Debridement type: surgical  Level of debridement: subcutaneous tissue  Pain control: lidocaine 2%  Post-debridement measurements  Length (cm): 0.2  Width (cm): 1  Depth (cm): 0.2  Percent debrided: 100%  Surface Area (cm^2): 0.2  Area debrided (cm^2): 0.2  Volume (cm^3): 0.04  Tissue and other material debrided: subcutaneous tissue  Devitalized tissue debrided: fibrin and slough  Instrument(s) utilized: curette  Bleeding: small  Hemostasis obtained with: not applicable  Response to treatment: procedure was tolerated well    Debridement   Wound 01/29/24 Venous Ulcer Leg Left;Lateral    Consent obtained? verbal  Consent given by: patient  Performed by: physician  Debridement type: surgical  Level of debridement: subcutaneous tissue  Pain control: lidocaine 2%  Post-debridement measurements  Length (cm): 3.1  Width (cm): 5.8  Depth (cm): 0.4  Percent debrided: 100%  Surface Area (cm^2): 17.98  Area debrided (cm^2): 17.98  Volume (cm^3): 7.19  Tissue and other  material debrided: subcutaneous tissue  Devitalized tissue debrided: fibrin and slough  Instrument(s) utilized: curette  Bleeding: small  Hemostasis obtained with: not applicable  Response to treatment: procedure was tolerated well    Debridement   Wound 01/29/24 Venous Ulcer Leg Left;Anterior;Lateral    Consent obtained? verbal  Consent given by: patient  Performed by: physician  Debridement type: surgical  Level of debridement: subcutaneous tissue  Pain control: lidocaine 2%  Post-debridement measurements  Length (cm): 3  Width (cm): 1  Depth (cm): 0.2  Percent debrided: 100%  Surface Area (cm^2): 3  Area debrided (cm^2): 3  Volume (cm^3): 0.6  Tissue and other material debrided: hypergranulation and subcutaneous tissue  Devitalized tissue debrided: biofilm, fibrin and slough  Instrument(s) utilized: curette and nippers  Bleeding: small  Hemostasis obtained with: not applicable  Response to treatment: procedure was tolerated well        Assessment/Plan   LLE ulcers as noted - little improvement overall. Continue the iodosorb and mepilex. Continue the interdry. Continue the edemawear and tubigrip compression.  Follow up 3 weeks.    VISIT ORDERS:  Vascular Study Required: n  Labs Required: n  Radiology Imaging Required: n  Consultation Required: n  Rx Provided:   Changes to Plan of Care: n      NEW ORDERS:  Wash: fredy wash  Irrigate/Soak:  Skin Care: zinc surrounding  Dressing:  iodoflex and mepilex change  3 x a week  Compression:  edemawear and tubigrip  Offloading:     DISCHARGE PLANNING:    Follow Up: 3 weeks  Nurse Visit: prn    General Instructions:  [place] RN to apply EMLA/Lidocaine 1% jelly/LMX  topically to wound(s) prior to debridement by physician.  [place] RN my see patient as a nurse consult in my absence.      RN Post Procedure Note:      LLE:  Iodosorb with ulcers and silicone foam, left anterior lateral fold with iodosorb and ABD.  Left leg folds with nystatin powder and interdry with edemawear and  tubigrip.  RLE, edemawear and tubigrip.  Patient tolerated well. SALAS Parra RN and KUNAL East RN     [facility]/ECF/Assisted Living  Agency/Facility:  [facility]   days/week:3 times a week  Contacted Regarding Changes:

## 2024-03-12 PROCEDURE — 1090000001 HH PPS REVENUE CREDIT

## 2024-03-12 PROCEDURE — 1090000002 HH PPS REVENUE DEBIT

## 2024-03-13 ENCOUNTER — HOME CARE VISIT (OUTPATIENT)
Dept: HOME HEALTH SERVICES | Facility: HOME HEALTH | Age: 59
End: 2024-03-13
Payer: MEDICARE

## 2024-03-13 VITALS — TEMPERATURE: 96.8 F | HEART RATE: 89 BPM | SYSTOLIC BLOOD PRESSURE: 132 MMHG | DIASTOLIC BLOOD PRESSURE: 64 MMHG

## 2024-03-13 PROCEDURE — 1090000001 HH PPS REVENUE CREDIT

## 2024-03-13 PROCEDURE — G0300 HHS/HOSPICE OF LPN EA 15 MIN: HCPCS | Mod: HHH

## 2024-03-13 PROCEDURE — 1090000002 HH PPS REVENUE DEBIT

## 2024-03-13 PROCEDURE — 400014 HH F/U

## 2024-03-13 ASSESSMENT — ENCOUNTER SYMPTOMS
PAIN: 1
LOWEST PAIN SEVERITY IN PAST 24 HOURS: 6/10
COUGH: 1
CHANGE IN APPETITE: UNCHANGED
HIGHEST PAIN SEVERITY IN PAST 24 HOURS: 6/10
PERSON REPORTING PAIN: PATIENT
SUBJECTIVE PAIN PROGRESSION: UNCHANGED
PAIN LOCATION: GENERALIZED
PAIN SEVERITY GOAL: 0/10
APPETITE LEVEL: GOOD
OCCASIONAL FEELINGS OF UNSTEADINESS: 0
LOWER EXTREMITY EDEMA: 1

## 2024-03-13 ASSESSMENT — ACTIVITIES OF DAILY LIVING (ADL)
MONEY MANAGEMENT (EXPENSES/BILLS): INDEPENDENT
AMBULATION ASSISTANCE: 1
AMBULATION ASSISTANCE: INDEPENDENT

## 2024-03-14 PROCEDURE — 1090000002 HH PPS REVENUE DEBIT

## 2024-03-14 PROCEDURE — 1090000001 HH PPS REVENUE CREDIT

## 2024-03-15 ENCOUNTER — HOME CARE VISIT (OUTPATIENT)
Dept: HOME HEALTH SERVICES | Facility: HOME HEALTH | Age: 59
End: 2024-03-15
Payer: MEDICARE

## 2024-03-15 PROCEDURE — 1090000001 HH PPS REVENUE CREDIT

## 2024-03-15 PROCEDURE — 1090000002 HH PPS REVENUE DEBIT

## 2024-03-15 PROCEDURE — G0300 HHS/HOSPICE OF LPN EA 15 MIN: HCPCS | Mod: HHH

## 2024-03-15 ASSESSMENT — ENCOUNTER SYMPTOMS
PAIN SEVERITY GOAL: 0/10
LOWEST PAIN SEVERITY IN PAST 24 HOURS: 6/10
APPETITE LEVEL: GOOD
CHANGE IN APPETITE: UNCHANGED
LOWER EXTREMITY EDEMA: 1
PAIN: 1
HIGHEST PAIN SEVERITY IN PAST 24 HOURS: 6/10
OCCASIONAL FEELINGS OF UNSTEADINESS: 0
PERSON REPORTING PAIN: PATIENT
SUBJECTIVE PAIN PROGRESSION: UNCHANGED

## 2024-03-15 ASSESSMENT — ACTIVITIES OF DAILY LIVING (ADL): MONEY MANAGEMENT (EXPENSES/BILLS): INDEPENDENT

## 2024-03-16 PROCEDURE — 1090000002 HH PPS REVENUE DEBIT

## 2024-03-16 PROCEDURE — 1090000001 HH PPS REVENUE CREDIT

## 2024-03-17 PROCEDURE — 1090000002 HH PPS REVENUE DEBIT

## 2024-03-17 PROCEDURE — 1090000001 HH PPS REVENUE CREDIT

## 2024-03-18 ENCOUNTER — HOME CARE VISIT (OUTPATIENT)
Dept: HOME HEALTH SERVICES | Facility: HOME HEALTH | Age: 59
End: 2024-03-18
Payer: MEDICARE

## 2024-03-18 VITALS
HEART RATE: 94 BPM | RESPIRATION RATE: 18 BRPM | SYSTOLIC BLOOD PRESSURE: 122 MMHG | OXYGEN SATURATION: 98 % | TEMPERATURE: 97.6 F | DIASTOLIC BLOOD PRESSURE: 76 MMHG

## 2024-03-18 PROCEDURE — 1090000002 HH PPS REVENUE DEBIT

## 2024-03-18 PROCEDURE — G0300 HHS/HOSPICE OF LPN EA 15 MIN: HCPCS | Mod: HHH

## 2024-03-18 PROCEDURE — 1090000001 HH PPS REVENUE CREDIT

## 2024-03-18 ASSESSMENT — ENCOUNTER SYMPTOMS
PERSON REPORTING PAIN: PATIENT
PAIN: 1
PAIN LOCATION: LEFT FOOT
PAIN LOCATION - PAIN QUALITY: ACHING
PAIN LOCATION - PAIN FREQUENCY: FREQUENT
LAST BOWEL MOVEMENT: 66917
PAIN SEVERITY GOAL: 0/10
HIGHEST PAIN SEVERITY IN PAST 24 HOURS: 8/10
LOWER EXTREMITY EDEMA: 1
PAIN LOCATION - PAIN SEVERITY: 6/10
LOWEST PAIN SEVERITY IN PAST 24 HOURS: 4/10
APPETITE LEVEL: GOOD

## 2024-03-19 PROCEDURE — 1090000001 HH PPS REVENUE CREDIT

## 2024-03-19 PROCEDURE — 1090000002 HH PPS REVENUE DEBIT

## 2024-03-20 ENCOUNTER — HOME CARE VISIT (OUTPATIENT)
Dept: HOME HEALTH SERVICES | Facility: HOME HEALTH | Age: 59
End: 2024-03-20
Payer: MEDICARE

## 2024-03-20 VITALS
HEART RATE: 94 BPM | RESPIRATION RATE: 18 BRPM | SYSTOLIC BLOOD PRESSURE: 108 MMHG | TEMPERATURE: 97.7 F | DIASTOLIC BLOOD PRESSURE: 66 MMHG

## 2024-03-20 PROCEDURE — 1090000002 HH PPS REVENUE DEBIT

## 2024-03-20 PROCEDURE — G0300 HHS/HOSPICE OF LPN EA 15 MIN: HCPCS | Mod: HHH

## 2024-03-20 PROCEDURE — 1090000001 HH PPS REVENUE CREDIT

## 2024-03-20 ASSESSMENT — ENCOUNTER SYMPTOMS
PAIN: 1
PERSON REPORTING PAIN: PATIENT
HIGHEST PAIN SEVERITY IN PAST 24 HOURS: 8/10
PAIN LOCATION: LEFT FOOT
LOWEST PAIN SEVERITY IN PAST 24 HOURS: 4/10
PAIN LOCATION - PAIN FREQUENCY: FREQUENT
APPETITE LEVEL: GOOD
PAIN SEVERITY GOAL: 0/10
PAIN LOCATION - PAIN QUALITY: ACHING
PAIN LOCATION - PAIN SEVERITY: 6/10

## 2024-03-21 PROCEDURE — 1090000001 HH PPS REVENUE CREDIT

## 2024-03-21 PROCEDURE — 1090000002 HH PPS REVENUE DEBIT

## 2024-03-22 ENCOUNTER — HOME CARE VISIT (OUTPATIENT)
Dept: HOME HEALTH SERVICES | Facility: HOME HEALTH | Age: 59
End: 2024-03-22
Payer: MEDICARE

## 2024-03-22 VITALS
RESPIRATION RATE: 18 BRPM | TEMPERATURE: 97.6 F | SYSTOLIC BLOOD PRESSURE: 116 MMHG | DIASTOLIC BLOOD PRESSURE: 74 MMHG | HEART RATE: 97 BPM

## 2024-03-22 PROCEDURE — G0300 HHS/HOSPICE OF LPN EA 15 MIN: HCPCS | Mod: HHH

## 2024-03-22 PROCEDURE — 1090000001 HH PPS REVENUE CREDIT

## 2024-03-22 PROCEDURE — 1090000002 HH PPS REVENUE DEBIT

## 2024-03-22 ASSESSMENT — ENCOUNTER SYMPTOMS
PAIN SEVERITY GOAL: 0/10
LOWEST PAIN SEVERITY IN PAST 24 HOURS: 4/10
HIGHEST PAIN SEVERITY IN PAST 24 HOURS: 8/10
LAST BOWEL MOVEMENT: 66920
PAIN LOCATION - PAIN SEVERITY: 6/10
BOWEL PATTERN NORMAL: 1
PAIN LOCATION - PAIN FREQUENCY: FREQUENT
PAIN LOCATION - PAIN QUALITY: ACHING
PAIN LOCATION: LEFT FOOT
APPETITE LEVEL: GOOD
LOWER EXTREMITY EDEMA: 1
PAIN: 1

## 2024-03-23 PROCEDURE — 1090000002 HH PPS REVENUE DEBIT

## 2024-03-23 PROCEDURE — 1090000001 HH PPS REVENUE CREDIT

## 2024-03-24 PROCEDURE — 1090000002 HH PPS REVENUE DEBIT

## 2024-03-24 PROCEDURE — 1090000001 HH PPS REVENUE CREDIT

## 2024-03-25 ENCOUNTER — HOME CARE VISIT (OUTPATIENT)
Dept: HOME HEALTH SERVICES | Facility: HOME HEALTH | Age: 59
End: 2024-03-25
Payer: MEDICARE

## 2024-03-25 PROCEDURE — 1090000002 HH PPS REVENUE DEBIT

## 2024-03-25 PROCEDURE — 1090000001 HH PPS REVENUE CREDIT

## 2024-03-25 PROCEDURE — G0299 HHS/HOSPICE OF RN EA 15 MIN: HCPCS | Mod: HHH

## 2024-03-26 VITALS
DIASTOLIC BLOOD PRESSURE: 70 MMHG | HEART RATE: 99 BPM | SYSTOLIC BLOOD PRESSURE: 128 MMHG | RESPIRATION RATE: 19 BRPM | OXYGEN SATURATION: 98 % | TEMPERATURE: 98.6 F

## 2024-03-26 PROCEDURE — 1090000002 HH PPS REVENUE DEBIT

## 2024-03-26 PROCEDURE — 1090000001 HH PPS REVENUE CREDIT

## 2024-03-26 SDOH — ECONOMIC STABILITY: GENERAL

## 2024-03-26 ASSESSMENT — ENCOUNTER SYMPTOMS
STOOL FREQUENCY: DAILY
PAIN LOCATION - PAIN SEVERITY: 3/10
PAIN SEVERITY GOAL: 1/10
PAIN LOCATION - PAIN FREQUENCY: INTERMITTENT
BOWEL PATTERN NORMAL: 1
PAIN LOCATION - PAIN QUALITY: ACHY
APPETITE LEVEL: FAIR
MUSCLE WEAKNESS: 1
PAIN LOCATION: GENERALIZED
LOWEST PAIN SEVERITY IN PAST 24 HOURS: 1/10
SUBJECTIVE PAIN PROGRESSION: UNCHANGED
LAST BOWEL MOVEMENT: 66924
HIGHEST PAIN SEVERITY IN PAST 24 HOURS: 3/10
CHANGE IN APPETITE: UNCHANGED
DRY SKIN: 1
PERSON REPORTING PAIN: PATIENT
PAIN: 1

## 2024-03-26 ASSESSMENT — ACTIVITIES OF DAILY LIVING (ADL): MONEY MANAGEMENT (EXPENSES/BILLS): INDEPENDENT

## 2024-03-27 ENCOUNTER — HOME CARE VISIT (OUTPATIENT)
Dept: HOME HEALTH SERVICES | Facility: HOME HEALTH | Age: 59
End: 2024-03-27
Payer: MEDICARE

## 2024-03-27 VITALS — DIASTOLIC BLOOD PRESSURE: 77 MMHG | HEART RATE: 92 BPM | SYSTOLIC BLOOD PRESSURE: 112 MMHG | TEMPERATURE: 97.2 F

## 2024-03-27 PROCEDURE — G0300 HHS/HOSPICE OF LPN EA 15 MIN: HCPCS | Mod: HHH

## 2024-03-27 PROCEDURE — 1090000002 HH PPS REVENUE DEBIT

## 2024-03-27 PROCEDURE — 1090000001 HH PPS REVENUE CREDIT

## 2024-03-27 ASSESSMENT — ENCOUNTER SYMPTOMS
HIGHEST PAIN SEVERITY IN PAST 24 HOURS: 6/10
PAIN: 1
PAIN LOCATION: GENERALIZED
OCCASIONAL FEELINGS OF UNSTEADINESS: 0
PAIN SEVERITY GOAL: 0/10
CHANGE IN APPETITE: UNCHANGED
LOWEST PAIN SEVERITY IN PAST 24 HOURS: 6/10
PERSON REPORTING PAIN: PATIENT
APPETITE LEVEL: GOOD

## 2024-03-27 ASSESSMENT — ACTIVITIES OF DAILY LIVING (ADL): MONEY MANAGEMENT (EXPENSES/BILLS): INDEPENDENT

## 2024-03-28 ENCOUNTER — HOME CARE VISIT (OUTPATIENT)
Dept: HOME HEALTH SERVICES | Facility: HOME HEALTH | Age: 59
End: 2024-03-28
Payer: MEDICARE

## 2024-03-28 PROCEDURE — 1090000002 HH PPS REVENUE DEBIT

## 2024-03-28 PROCEDURE — 1090000001 HH PPS REVENUE CREDIT

## 2024-03-29 ENCOUNTER — HOME CARE VISIT (OUTPATIENT)
Dept: HOME HEALTH SERVICES | Facility: HOME HEALTH | Age: 59
End: 2024-03-29
Payer: MEDICARE

## 2024-03-29 PROCEDURE — 1090000002 HH PPS REVENUE DEBIT

## 2024-03-29 PROCEDURE — G0300 HHS/HOSPICE OF LPN EA 15 MIN: HCPCS | Mod: HHH

## 2024-03-29 PROCEDURE — 1090000001 HH PPS REVENUE CREDIT

## 2024-03-29 ASSESSMENT — ENCOUNTER SYMPTOMS
OCCASIONAL FEELINGS OF UNSTEADINESS: 1
LOWEST PAIN SEVERITY IN PAST 24 HOURS: 6/10
HIGHEST PAIN SEVERITY IN PAST 24 HOURS: 6/10
SUBJECTIVE PAIN PROGRESSION: UNCHANGED
PAIN: 1
CHANGE IN APPETITE: UNCHANGED
APPETITE LEVEL: GOOD
PERSON REPORTING PAIN: PATIENT
PAIN SEVERITY GOAL: 0/10

## 2024-03-29 ASSESSMENT — ACTIVITIES OF DAILY LIVING (ADL): MONEY MANAGEMENT (EXPENSES/BILLS): INDEPENDENT

## 2024-03-30 PROCEDURE — 1090000002 HH PPS REVENUE DEBIT

## 2024-03-30 PROCEDURE — 1090000001 HH PPS REVENUE CREDIT

## 2024-03-31 PROCEDURE — 1090000001 HH PPS REVENUE CREDIT

## 2024-03-31 PROCEDURE — 1090000002 HH PPS REVENUE DEBIT

## 2024-04-01 ENCOUNTER — OFFICE VISIT (OUTPATIENT)
Dept: WOUND CARE | Facility: CLINIC | Age: 59
End: 2024-04-01
Payer: MEDICARE

## 2024-04-01 ENCOUNTER — HOME CARE VISIT (OUTPATIENT)
Dept: HOME HEALTH SERVICES | Facility: HOME HEALTH | Age: 59
End: 2024-04-01
Payer: MEDICARE

## 2024-04-01 VITALS
SYSTOLIC BLOOD PRESSURE: 146 MMHG | HEART RATE: 70 BPM | RESPIRATION RATE: 18 BRPM | HEIGHT: 63 IN | DIASTOLIC BLOOD PRESSURE: 82 MMHG | BODY MASS INDEX: 51.91 KG/M2 | WEIGHT: 293 LBS

## 2024-04-01 DIAGNOSIS — L97.221 CHRONIC ULCER OF LEFT CALF LIMITED TO BREAKDOWN OF SKIN (MULTI): Primary | ICD-10-CM

## 2024-04-01 DIAGNOSIS — I87.2 VENOUS INSUFFICIENCY: ICD-10-CM

## 2024-04-01 DIAGNOSIS — I89.0 LYMPHEDEMA: ICD-10-CM

## 2024-04-01 PROCEDURE — 1090000001 HH PPS REVENUE CREDIT

## 2024-04-01 PROCEDURE — 3008F BODY MASS INDEX DOCD: CPT | Performed by: INTERNAL MEDICINE

## 2024-04-01 PROCEDURE — 1090000002 HH PPS REVENUE DEBIT

## 2024-04-01 PROCEDURE — 11042 DBRDMT SUBQ TIS 1ST 20SQCM/<: CPT | Performed by: INTERNAL MEDICINE

## 2024-04-01 ASSESSMENT — PAIN SCALES - GENERAL: PAINLEVEL: 6

## 2024-04-01 NOTE — PROGRESS NOTES
"REFERRAL REQUESTED BY:   Ramesh Rothman DO PCP      LAST SEEN:  03/11/2024    Patient ID: Patricia Mckeon is a 58 y.o. female     HPI:   Here for follow up LLE lymphedema and multiple LE ulcers. Reports C will not come 3 x a  week. Last note said edemawear and tubigrip - today in double tubigrip(?).     CURRENT DRESSING:  Who is performing the dressing change:  [facility]  Dressing applied: Hydrofera blue w/foam( foam slipped and wound leaked onto the tubigrip), and interdry  Dressing Frequency: 3x/wk    EDEMA MANAGEMENT:  Compression:  Right: Double Tubigrip CALF 50.0 CM   Left: Double Tubigrip   CALF 87.0 CM Upper  39.0 CM Lower     Other Modality  Sleeping in Bed: yes  Elevating FOB:  yes    Offloading/Pressure Relief  Activity Level:  ad bradly  Protection Devices:  Darco shoe LLE    Offloading/Pressure Relief Effective?     ROS:      Objective     VITALS:  /82 (BP Location: Left arm, Patient Position: Sitting)   Pulse 70   Resp 18   Ht 1.6 m (5' 3\")   Wt (!) 171 kg (376 lb 12.8 oz)   BMI 66.75 kg/m²       Physical Exam    ARRIVAL:  Ambulating and Walker  TRANSFER:  None    PSYCHIATRIC:  Oriented to person, place and time: A & O x 3    CONSTITUTIONAL:    Appearance:  Well Groomed and Well Nourished    SKIN:   erythema     PULSES:     EXTREMITY TEMPERATURE:    RIGHT: normal  LEFT: normal    EDEMA: severe    WOUND ASSESSMENT:    Wound 01/29/24 Venous Ulcer Leg Left;Anterior;Lateral (Active)   Date First Assessed: 01/29/24   Primary Wound Type: Venous Ulcer  Location: Leg  Wound Location Orientation: Left;Anterior;Lateral      Assessments 2/14/2024 10:15 AM 4/1/2024  9:37 AM   Wound Image      Site Assessment Sloughing Pink;Red;Fibrinous   Cindi-Wound Assessment Intact Intact;Erythematous;Denuded   Non-staged Wound Description -- Full thickness   Wound Length (cm) -- 5.4 cm   Wound Width (cm) -- 0.9 cm   Wound Surface Area (cm^2) -- 4.86 cm^2   Wound Depth (cm) -- 0.1 cm   Wound Volume (cm^3) " -- 0.486 cm^3   Wound Healing % -- -143   State of Healing Non-healing --   Wound Bed Granulation (%) -- 50 %   Drainage Description Unable to assess Serosanguineous   Drainage Amount -- Small   Dressing Hydrogel --       Inactive Orders   Date Order Priority Status Authorizing Provider   03/11/24 0955 Debridement Routine Completed Adelita Martin MD   02/20/24 1024 Debridement Routine Completed Adelita Martin MD       Wound 01/29/24 Venous Ulcer Leg Left;Lateral;Lower (Active)   Date First Assessed: 01/29/24   Primary Wound Type: Venous Ulcer  Location: Leg  Wound Location Orientation: Left;Lateral;Lower      Assessments 2/20/2024  9:22 AM 4/1/2024  9:36 AM   Wound Image       Site Assessment Pink;Red;Fibrinous Pink;Red;Fibrinous   Cindi-Wound Assessment Intact Intact;Erythematous;Dry   Non-staged Wound Description Full thickness Full thickness   Wound Length (cm) 3 cm 2.8 cm   Wound Width (cm) 5.5 cm 4.2 cm   Wound Surface Area (cm^2) 16.5 cm^2 11.76 cm^2   Wound Depth (cm) 0.2 cm 0.2 cm   Wound Volume (cm^3) 3.3 cm^3 2.352 cm^3   Wound Healing % -- 29   Wound Bed Granulation (%) 10 % 25 %   Drainage Description Serosanguineous Serosanguineous   Drainage Amount Moderate Moderate       Inactive Orders   Date Order Priority Status Authorizing Provider   03/11/24 0953 Debridement Routine Completed Adelita Martin MD   02/20/24 1023 Debridement Routine Completed Adelita Martin MD       Wound 03/11/24 Moisture Associated Skin Damage Leg Left;Dorsal (Active)   Date First Assessed: 03/11/24   Wound Approximate Age at First Assessment (Weeks): 1 weeks  Primary Wound Type: Moisture Associated Skin Damage  Location: Leg  Wound Location Orientation: Left;Dorsal      Assessments 3/11/2024  9:27 AM 4/1/2024  9:39 AM   Wound Image       Site Assessment -- Denuded   Shape denuded skin Photo documentation of denuded skin   Wound Length (cm) 8 cm --   Wound Width (cm) 11.5 cm --   Wound Surface Area (cm^2) 92 cm^2 --   Wound  Depth (cm) 0 cm --   Wound Volume (cm^3) 0 cm^3 --       No associated orders.          Debridement   Wound 01/29/24 Venous Ulcer Leg Left;Lateral;Lower    Consent obtained? verbal  Consent given by: patient  Performed by: physician  Debridement type: surgical  Level of debridement: subcutaneous tissue  Pain control: lidocaine 2%  Post-debridement measurements  Length (cm): 2.8  Width (cm): 4.2  Depth (cm): 0.3  Percent debrided: 100%  Surface Area (cm^2): 11.76  Area debrided (cm^2): 11.76  Volume (cm^3): 3.53  Tissue and other material debrided: subcutaneous tissue  Devitalized tissue debrided: fibrin and slough  Instrument(s) utilized: curette  Bleeding: small  Hemostasis obtained with: not applicable  Response to treatment: procedure was tolerated well      Assessment/Plan   Lateral calf ulcer. Denuded skin. No lotions to the skin. Nystatin powder to the skin fold. Mepilex transfer and ABD to the skin folds change 2 x a week. Iodosorb and foam to the lateral calf ulcer. Try edemawear and tubigrip for compression.    VISIT ORDERS:  Vascular Study Required: n  Labs Required: n  Radiology Imaging Required: n  Consultation Required: n  Rx Provided:   Changes to Plan of Care: y      NEW ORDERS:  Wash: fredy  Irrigate/Soak:  Skin Care: nystatin powder to the skin folds  Dressing: mepilex transfer and ABD to the skin fold. Iodosorb and foam to the lateral calf  Compression: edemawear and tubigrip for compression   Offloading:     DISCHARGE PLANNING:    Follow Up: 3 weeks  Nurse Visit: prn    General Instructions:  [place] RN to apply EMLA/Lidocaine 1% jelly/LMX  topically to wound(s) prior to debridement by physician.  Center RN my see patient as a nurse consult in my absence.      RN Post Procedure Note:      Left lateral calf wound with iodosorb and mepilex.   Anterior folds and post calf with nystatin powder, then laid down mepilex transfer with ABD and secured with kerlix.   Edemawear and tubigrip applied.  RLE  double tubigrip.  Revision sent to [facility]. KUNAL East RN     HCC/ECF/Assisted Living  Agency/Facility: [facility]  days/week: 3x/wk  Contacted Regarding Changes: Y

## 2024-04-01 NOTE — PATIENT INSTRUCTIONS
Assessment/Plan   Lateral calf ulcer. Denuded skin. No lotions to the skin. Nystatin powder to the skin fold. Mepilex transfer and ABD to the skin folds change 2 x a week. Iodosorb and foam to the lateral calf ulcer. Try edemawear and tubigrip for compression.    VISIT ORDERS:  Vascular Study Required: n  Labs Required: n  Radiology Imaging Required: n  Consultation Required: n  Rx Provided:   Changes to Plan of Care: y      NEW ORDERS:  Wash: fredy  Irrigate/Soak:  Skin Care: nystatin powder to the skin folds  Dressing: mepilex transfer and ABD to the skin fold. Iodosorb and foam to the lateral calf  Compression: edemawear and tubigrip for compression   Offloading:     DISCHARGE PLANNING:    Follow Up: 3 weeks  Nurse Visit: prn

## 2024-04-02 PROCEDURE — 1090000002 HH PPS REVENUE DEBIT

## 2024-04-02 PROCEDURE — 1090000001 HH PPS REVENUE CREDIT

## 2024-04-03 ENCOUNTER — HOME CARE VISIT (OUTPATIENT)
Dept: HOME HEALTH SERVICES | Facility: HOME HEALTH | Age: 59
End: 2024-04-03
Payer: MEDICARE

## 2024-04-03 VITALS — TEMPERATURE: 97.1 F | DIASTOLIC BLOOD PRESSURE: 77 MMHG | HEART RATE: 94 BPM | SYSTOLIC BLOOD PRESSURE: 120 MMHG

## 2024-04-03 PROCEDURE — 1090000002 HH PPS REVENUE DEBIT

## 2024-04-03 PROCEDURE — 1090000001 HH PPS REVENUE CREDIT

## 2024-04-03 PROCEDURE — G0300 HHS/HOSPICE OF LPN EA 15 MIN: HCPCS | Mod: HHH

## 2024-04-03 ASSESSMENT — ENCOUNTER SYMPTOMS
PERSON REPORTING PAIN: PATIENT
HIGHEST PAIN SEVERITY IN PAST 24 HOURS: 6/10
APPETITE LEVEL: GOOD
OCCASIONAL FEELINGS OF UNSTEADINESS: 0
PAIN: 1
LOWER EXTREMITY EDEMA: 1
CHANGE IN APPETITE: UNCHANGED
PAIN SEVERITY GOAL: 0/10
LOWEST PAIN SEVERITY IN PAST 24 HOURS: 6/10
SUBJECTIVE PAIN PROGRESSION: UNCHANGED

## 2024-04-03 ASSESSMENT — ACTIVITIES OF DAILY LIVING (ADL): MONEY MANAGEMENT (EXPENSES/BILLS): INDEPENDENT

## 2024-04-04 ENCOUNTER — HOME CARE VISIT (OUTPATIENT)
Dept: HOME HEALTH SERVICES | Facility: HOME HEALTH | Age: 59
End: 2024-04-04
Payer: MEDICARE

## 2024-04-04 PROCEDURE — 1090000002 HH PPS REVENUE DEBIT

## 2024-04-04 PROCEDURE — 1090000001 HH PPS REVENUE CREDIT

## 2024-04-05 ENCOUNTER — HOME CARE VISIT (OUTPATIENT)
Dept: HOME HEALTH SERVICES | Facility: HOME HEALTH | Age: 59
End: 2024-04-05
Payer: MEDICARE

## 2024-04-05 VITALS
RESPIRATION RATE: 18 BRPM | SYSTOLIC BLOOD PRESSURE: 138 MMHG | OXYGEN SATURATION: 98 % | HEART RATE: 102 BPM | DIASTOLIC BLOOD PRESSURE: 80 MMHG | TEMPERATURE: 97.5 F

## 2024-04-05 PROCEDURE — G0299 HHS/HOSPICE OF RN EA 15 MIN: HCPCS | Mod: HHH

## 2024-04-05 PROCEDURE — 1090000001 HH PPS REVENUE CREDIT

## 2024-04-05 PROCEDURE — 1090000002 HH PPS REVENUE DEBIT

## 2024-04-05 ASSESSMENT — ENCOUNTER SYMPTOMS
SUBJECTIVE PAIN PROGRESSION: UNCHANGED
PERSON REPORTING PAIN: PATIENT
PAIN: 1
PAIN LOCATION: LEFT LEG
LIMITED RANGE OF MOTION: 1
PAIN LOCATION - PAIN SEVERITY: 6/10
CHANGE IN APPETITE: UNCHANGED
APPETITE LEVEL: GOOD

## 2024-04-05 ASSESSMENT — ACTIVITIES OF DAILY LIVING (ADL)
OASIS_M1830: 01
ENTERING_EXITING_HOME: STAND BY ASSIST

## 2024-04-05 NOTE — HOME HEALTH
RN recertification visit. Continued skilled need for wound care. Patient unable to reach or visualize wound, no caregiver available. Wound healing very slowly. VSS. Baseline pain level. Medications Reviewed. No changes. Patient reported a fall that happened earlier this week. No injury. Case communiation used to notify MD of fall and recert.    Wound care performed. Orders updated in care plan. No supplies needed at this time. Pictures and measurements uploaded.    SN to follow for wound care.

## 2024-04-05 NOTE — Clinical Note
Patient reports a fall this week. Stating she was using a cane instead of her walker and carrying a cup of coffee. She was home alone and did not have her phone on her. Per patient, after some time; she was able to get off floor by herself. Denies hitting her head, denying any new pain.    recert visit performed for continued skilled care. [facility] office staff looking into long term homecare services to better serve patients needs.

## 2024-04-06 PROCEDURE — 1090000001 HH PPS REVENUE CREDIT

## 2024-04-06 PROCEDURE — 1090000002 HH PPS REVENUE DEBIT

## 2024-04-07 PROCEDURE — 1090000002 HH PPS REVENUE DEBIT

## 2024-04-07 PROCEDURE — 1090000001 HH PPS REVENUE CREDIT

## 2024-04-08 ENCOUNTER — HOME CARE VISIT (OUTPATIENT)
Dept: HOME HEALTH SERVICES | Facility: HOME HEALTH | Age: 59
End: 2024-04-08
Payer: MEDICARE

## 2024-04-08 VITALS — DIASTOLIC BLOOD PRESSURE: 76 MMHG | HEART RATE: 102 BPM | SYSTOLIC BLOOD PRESSURE: 112 MMHG | TEMPERATURE: 98.6 F

## 2024-04-08 PROCEDURE — G0300 HHS/HOSPICE OF LPN EA 15 MIN: HCPCS | Mod: HHH

## 2024-04-08 PROCEDURE — 1090000001 HH PPS REVENUE CREDIT

## 2024-04-08 PROCEDURE — 1090000002 HH PPS REVENUE DEBIT

## 2024-04-08 ASSESSMENT — ENCOUNTER SYMPTOMS
LOWER EXTREMITY EDEMA: 1
APPETITE LEVEL: GOOD
LOWEST PAIN SEVERITY IN PAST 24 HOURS: 6/10
PAIN SEVERITY GOAL: 0/10
HIGHEST PAIN SEVERITY IN PAST 24 HOURS: 6/10
PERSON REPORTING PAIN: PATIENT
PAIN: 1
SUBJECTIVE PAIN PROGRESSION: UNCHANGED
OCCASIONAL FEELINGS OF UNSTEADINESS: 0
CHANGE IN APPETITE: UNCHANGED

## 2024-04-08 ASSESSMENT — ACTIVITIES OF DAILY LIVING (ADL): MONEY MANAGEMENT (EXPENSES/BILLS): INDEPENDENT

## 2024-04-09 PROCEDURE — 1090000002 HH PPS REVENUE DEBIT

## 2024-04-09 PROCEDURE — 1090000001 HH PPS REVENUE CREDIT

## 2024-04-10 ENCOUNTER — HOME CARE VISIT (OUTPATIENT)
Dept: HOME HEALTH SERVICES | Facility: HOME HEALTH | Age: 59
End: 2024-04-10
Payer: MEDICARE

## 2024-04-10 VITALS
DIASTOLIC BLOOD PRESSURE: 80 MMHG | TEMPERATURE: 98.4 F | OXYGEN SATURATION: 97 % | RESPIRATION RATE: 18 BRPM | HEART RATE: 98 BPM | SYSTOLIC BLOOD PRESSURE: 144 MMHG

## 2024-04-10 PROCEDURE — 400014 HH F/U

## 2024-04-10 PROCEDURE — 1090000002 HH PPS REVENUE DEBIT

## 2024-04-10 PROCEDURE — 1090000001 HH PPS REVENUE CREDIT

## 2024-04-10 PROCEDURE — G0299 HHS/HOSPICE OF RN EA 15 MIN: HCPCS | Mod: HHH

## 2024-04-10 ASSESSMENT — ENCOUNTER SYMPTOMS
SKIN LESIONS: 1
CHANGE IN APPETITE: UNCHANGED
PAIN LOCATION - PAIN SEVERITY: 7/10
PAIN LOCATION: LEFT ANKLE
PERSON REPORTING PAIN: PATIENT
PAIN: 1
APPETITE LEVEL: GOOD

## 2024-04-10 NOTE — HOME HEALTH
RN follow up visit for wound care. VSS. Baseline pain reported. Medications reviewed, patient compliant. No falls reported.    Wound care provided as ordered. Iodosorb and mepilex transfer drsg ordered.    SN to follow for wound care

## 2024-04-11 ENCOUNTER — HOME CARE VISIT (OUTPATIENT)
Dept: HOME HEALTH SERVICES | Facility: HOME HEALTH | Age: 59
End: 2024-04-11
Payer: MEDICARE

## 2024-04-11 PROCEDURE — 1090000002 HH PPS REVENUE DEBIT

## 2024-04-11 PROCEDURE — 1090000001 HH PPS REVENUE CREDIT

## 2024-04-11 PROCEDURE — G0179 MD RECERTIFICATION HHA PT: HCPCS | Performed by: INTERNAL MEDICINE

## 2024-04-12 PROCEDURE — 1090000002 HH PPS REVENUE DEBIT

## 2024-04-12 PROCEDURE — 1090000001 HH PPS REVENUE CREDIT

## 2024-04-13 ENCOUNTER — HOME CARE VISIT (OUTPATIENT)
Dept: HOME HEALTH SERVICES | Facility: HOME HEALTH | Age: 59
End: 2024-04-13
Payer: MEDICARE

## 2024-04-13 VITALS
TEMPERATURE: 97.8 F | RESPIRATION RATE: 18 BRPM | SYSTOLIC BLOOD PRESSURE: 108 MMHG | HEART RATE: 94 BPM | DIASTOLIC BLOOD PRESSURE: 70 MMHG

## 2024-04-13 PROCEDURE — 1090000001 HH PPS REVENUE CREDIT

## 2024-04-13 PROCEDURE — G0300 HHS/HOSPICE OF LPN EA 15 MIN: HCPCS | Mod: HHH

## 2024-04-13 PROCEDURE — 1090000002 HH PPS REVENUE DEBIT

## 2024-04-13 ASSESSMENT — ENCOUNTER SYMPTOMS
BOWEL PATTERN NORMAL: 1
PAIN: 1
LOWEST PAIN SEVERITY IN PAST 24 HOURS: 4/10
HIGHEST PAIN SEVERITY IN PAST 24 HOURS: 8/10
PAIN LOCATION - PAIN QUALITY: ACHE
LAST BOWEL MOVEMENT: 66942
PAIN LOCATION - PAIN FREQUENCY: FREQUENT
PAIN LOCATION - PAIN SEVERITY: 6/10
PERSON REPORTING PAIN: PATIENT
PAIN SEVERITY GOAL: 0/10
PAIN LOCATION: LEFT FOOT
APPETITE LEVEL: GOOD

## 2024-04-14 PROCEDURE — 1090000001 HH PPS REVENUE CREDIT

## 2024-04-14 PROCEDURE — 1090000002 HH PPS REVENUE DEBIT

## 2024-04-15 ENCOUNTER — HOME CARE VISIT (OUTPATIENT)
Dept: HOME HEALTH SERVICES | Facility: HOME HEALTH | Age: 59
End: 2024-04-15
Payer: MEDICARE

## 2024-04-15 VITALS — SYSTOLIC BLOOD PRESSURE: 116 MMHG | DIASTOLIC BLOOD PRESSURE: 78 MMHG | HEART RATE: 95 BPM | TEMPERATURE: 98 F

## 2024-04-15 PROCEDURE — 1090000002 HH PPS REVENUE DEBIT

## 2024-04-15 PROCEDURE — G0300 HHS/HOSPICE OF LPN EA 15 MIN: HCPCS | Mod: HHH

## 2024-04-15 PROCEDURE — 1090000001 HH PPS REVENUE CREDIT

## 2024-04-15 ASSESSMENT — ENCOUNTER SYMPTOMS
PAIN: 1
LOWEST PAIN SEVERITY IN PAST 24 HOURS: 6/10
APPETITE LEVEL: GOOD
OCCASIONAL FEELINGS OF UNSTEADINESS: 0
HIGHEST PAIN SEVERITY IN PAST 24 HOURS: 6/10
PAIN LOCATION: GENERALIZED
CHANGE IN APPETITE: UNCHANGED
PAIN SEVERITY GOAL: 0/10
LOWER EXTREMITY EDEMA: 1

## 2024-04-15 ASSESSMENT — ACTIVITIES OF DAILY LIVING (ADL): MONEY MANAGEMENT (EXPENSES/BILLS): INDEPENDENT

## 2024-04-16 PROCEDURE — 1090000002 HH PPS REVENUE DEBIT

## 2024-04-16 PROCEDURE — 1090000001 HH PPS REVENUE CREDIT

## 2024-04-17 ENCOUNTER — HOME CARE VISIT (OUTPATIENT)
Dept: HOME HEALTH SERVICES | Facility: HOME HEALTH | Age: 59
End: 2024-04-17
Payer: MEDICARE

## 2024-04-17 VITALS
RESPIRATION RATE: 14 BRPM | OXYGEN SATURATION: 96 % | TEMPERATURE: 97.1 F | HEART RATE: 100 BPM | SYSTOLIC BLOOD PRESSURE: 137 MMHG | DIASTOLIC BLOOD PRESSURE: 80 MMHG

## 2024-04-17 PROCEDURE — 1090000002 HH PPS REVENUE DEBIT

## 2024-04-17 PROCEDURE — G0300 HHS/HOSPICE OF LPN EA 15 MIN: HCPCS | Mod: HHH

## 2024-04-17 PROCEDURE — 1090000001 HH PPS REVENUE CREDIT

## 2024-04-18 PROCEDURE — 1090000001 HH PPS REVENUE CREDIT

## 2024-04-18 PROCEDURE — 1090000002 HH PPS REVENUE DEBIT

## 2024-04-18 SDOH — ECONOMIC STABILITY: GENERAL

## 2024-04-18 ASSESSMENT — ENCOUNTER SYMPTOMS
DEPRESSION: 0
APPETITE LEVEL: GOOD
DENIES PAIN: 1
SUBJECTIVE PAIN PROGRESSION: UNCHANGED
PERSON REPORTING PAIN: PATIENT
OCCASIONAL FEELINGS OF UNSTEADINESS: 0
CHANGE IN APPETITE: UNCHANGED
LOSS OF SENSATION IN FEET: 0

## 2024-04-18 ASSESSMENT — ACTIVITIES OF DAILY LIVING (ADL): MONEY MANAGEMENT (EXPENSES/BILLS): INDEPENDENT

## 2024-04-19 ENCOUNTER — HOME CARE VISIT (OUTPATIENT)
Dept: HOME HEALTH SERVICES | Facility: HOME HEALTH | Age: 59
End: 2024-04-19
Payer: MEDICARE

## 2024-04-19 PROCEDURE — 1090000002 HH PPS REVENUE DEBIT

## 2024-04-19 PROCEDURE — G0300 HHS/HOSPICE OF LPN EA 15 MIN: HCPCS | Mod: HHH

## 2024-04-19 PROCEDURE — 1090000001 HH PPS REVENUE CREDIT

## 2024-04-20 VITALS
SYSTOLIC BLOOD PRESSURE: 122 MMHG | OXYGEN SATURATION: 96 % | TEMPERATURE: 98.4 F | DIASTOLIC BLOOD PRESSURE: 88 MMHG | HEART RATE: 100 BPM

## 2024-04-20 PROCEDURE — 1090000002 HH PPS REVENUE DEBIT

## 2024-04-20 PROCEDURE — 1090000001 HH PPS REVENUE CREDIT

## 2024-04-20 ASSESSMENT — ENCOUNTER SYMPTOMS
HIGHEST PAIN SEVERITY IN PAST 24 HOURS: 6/10
SUBJECTIVE PAIN PROGRESSION: UNCHANGED
CHANGE IN APPETITE: UNCHANGED
PAIN: 1
PAIN SEVERITY GOAL: 0/10
LOWEST PAIN SEVERITY IN PAST 24 HOURS: 6/10
OCCASIONAL FEELINGS OF UNSTEADINESS: 0
APPETITE LEVEL: GOOD

## 2024-04-20 ASSESSMENT — ACTIVITIES OF DAILY LIVING (ADL): MONEY MANAGEMENT (EXPENSES/BILLS): INDEPENDENT

## 2024-04-21 PROCEDURE — 1090000002 HH PPS REVENUE DEBIT

## 2024-04-21 PROCEDURE — 1090000001 HH PPS REVENUE CREDIT

## 2024-04-22 ENCOUNTER — HOME CARE VISIT (OUTPATIENT)
Dept: HOME HEALTH SERVICES | Facility: HOME HEALTH | Age: 59
End: 2024-04-22
Payer: MEDICARE

## 2024-04-22 VITALS
DIASTOLIC BLOOD PRESSURE: 82 MMHG | TEMPERATURE: 97.2 F | OXYGEN SATURATION: 96 % | SYSTOLIC BLOOD PRESSURE: 119 MMHG | HEART RATE: 114 BPM

## 2024-04-22 PROCEDURE — G0300 HHS/HOSPICE OF LPN EA 15 MIN: HCPCS | Mod: HHH

## 2024-04-22 PROCEDURE — 1090000001 HH PPS REVENUE CREDIT

## 2024-04-22 PROCEDURE — 1090000002 HH PPS REVENUE DEBIT

## 2024-04-22 ASSESSMENT — ENCOUNTER SYMPTOMS
PAIN SEVERITY GOAL: 0/10
PERSON REPORTING PAIN: PATIENT
PAIN LOCATION: GENERALIZED
OCCASIONAL FEELINGS OF UNSTEADINESS: 0
LOWEST PAIN SEVERITY IN PAST 24 HOURS: 6/10
APPETITE LEVEL: GOOD
HIGHEST PAIN SEVERITY IN PAST 24 HOURS: 6/10
PAIN: 1
SUBJECTIVE PAIN PROGRESSION: UNCHANGED
CHANGE IN APPETITE: UNCHANGED

## 2024-04-22 ASSESSMENT — ACTIVITIES OF DAILY LIVING (ADL): MONEY MANAGEMENT (EXPENSES/BILLS): INDEPENDENT

## 2024-04-23 PROCEDURE — 1090000002 HH PPS REVENUE DEBIT

## 2024-04-23 PROCEDURE — 1090000001 HH PPS REVENUE CREDIT

## 2024-04-24 ENCOUNTER — HOME CARE VISIT (OUTPATIENT)
Dept: HOME HEALTH SERVICES | Facility: HOME HEALTH | Age: 59
End: 2024-04-24
Payer: MEDICARE

## 2024-04-24 VITALS — DIASTOLIC BLOOD PRESSURE: 70 MMHG | SYSTOLIC BLOOD PRESSURE: 132 MMHG | HEART RATE: 74 BPM

## 2024-04-24 PROCEDURE — G0300 HHS/HOSPICE OF LPN EA 15 MIN: HCPCS | Mod: HHH

## 2024-04-24 PROCEDURE — 1090000002 HH PPS REVENUE DEBIT

## 2024-04-24 PROCEDURE — 1090000001 HH PPS REVENUE CREDIT

## 2024-04-24 ASSESSMENT — ENCOUNTER SYMPTOMS
OCCASIONAL FEELINGS OF UNSTEADINESS: 0
PAIN: 1
SUBJECTIVE PAIN PROGRESSION: UNCHANGED
CHANGE IN APPETITE: UNCHANGED
PERSON REPORTING PAIN: PATIENT
LOWEST PAIN SEVERITY IN PAST 24 HOURS: 6/10
HIGHEST PAIN SEVERITY IN PAST 24 HOURS: 6/10
APPETITE LEVEL: GOOD
PAIN SEVERITY GOAL: 0/10

## 2024-04-24 ASSESSMENT — ACTIVITIES OF DAILY LIVING (ADL): MONEY MANAGEMENT (EXPENSES/BILLS): INDEPENDENT

## 2024-04-25 PROCEDURE — 1090000002 HH PPS REVENUE DEBIT

## 2024-04-25 PROCEDURE — 1090000001 HH PPS REVENUE CREDIT

## 2024-04-26 ENCOUNTER — HOME CARE VISIT (OUTPATIENT)
Dept: HOME HEALTH SERVICES | Facility: HOME HEALTH | Age: 59
End: 2024-04-26
Payer: MEDICARE

## 2024-04-26 VITALS — SYSTOLIC BLOOD PRESSURE: 111 MMHG | HEART RATE: 83 BPM | DIASTOLIC BLOOD PRESSURE: 75 MMHG | TEMPERATURE: 97.4 F

## 2024-04-26 PROCEDURE — 1090000001 HH PPS REVENUE CREDIT

## 2024-04-26 PROCEDURE — 1090000002 HH PPS REVENUE DEBIT

## 2024-04-26 PROCEDURE — G0300 HHS/HOSPICE OF LPN EA 15 MIN: HCPCS | Mod: HHH

## 2024-04-26 ASSESSMENT — ENCOUNTER SYMPTOMS
PAIN: 1
CHANGE IN APPETITE: UNCHANGED
PAIN SEVERITY GOAL: 0/10
HIGHEST PAIN SEVERITY IN PAST 24 HOURS: 6/10
PERSON REPORTING PAIN: PATIENT
LOWER EXTREMITY EDEMA: 1
LOWEST PAIN SEVERITY IN PAST 24 HOURS: 6/10
APPETITE LEVEL: GOOD
OCCASIONAL FEELINGS OF UNSTEADINESS: 0
SUBJECTIVE PAIN PROGRESSION: UNCHANGED

## 2024-04-26 ASSESSMENT — ACTIVITIES OF DAILY LIVING (ADL): MONEY MANAGEMENT (EXPENSES/BILLS): INDEPENDENT

## 2024-04-27 PROCEDURE — 1090000002 HH PPS REVENUE DEBIT

## 2024-04-27 PROCEDURE — 1090000001 HH PPS REVENUE CREDIT

## 2024-04-28 PROCEDURE — 1090000001 HH PPS REVENUE CREDIT

## 2024-04-28 PROCEDURE — 1090000002 HH PPS REVENUE DEBIT

## 2024-04-29 ENCOUNTER — APPOINTMENT (OUTPATIENT)
Dept: WOUND CARE | Facility: CLINIC | Age: 59
End: 2024-04-29
Payer: MEDICARE

## 2024-04-29 ENCOUNTER — HOME CARE VISIT (OUTPATIENT)
Dept: HOME HEALTH SERVICES | Facility: HOME HEALTH | Age: 59
End: 2024-04-29
Payer: MEDICARE

## 2024-04-29 PROCEDURE — 1090000002 HH PPS REVENUE DEBIT

## 2024-04-29 PROCEDURE — 1090000001 HH PPS REVENUE CREDIT

## 2024-04-30 PROCEDURE — 1090000001 HH PPS REVENUE CREDIT

## 2024-04-30 PROCEDURE — 1090000002 HH PPS REVENUE DEBIT

## 2024-05-01 ENCOUNTER — HOME CARE VISIT (OUTPATIENT)
Dept: HOME HEALTH SERVICES | Facility: HOME HEALTH | Age: 59
End: 2024-05-01
Payer: MEDICARE

## 2024-05-01 VITALS
OXYGEN SATURATION: 97 % | HEART RATE: 99 BPM | TEMPERATURE: 97.3 F | DIASTOLIC BLOOD PRESSURE: 82 MMHG | RESPIRATION RATE: 18 BRPM | SYSTOLIC BLOOD PRESSURE: 138 MMHG

## 2024-05-01 PROCEDURE — 1090000001 HH PPS REVENUE CREDIT

## 2024-05-01 PROCEDURE — 1090000002 HH PPS REVENUE DEBIT

## 2024-05-01 PROCEDURE — G0299 HHS/HOSPICE OF RN EA 15 MIN: HCPCS | Mod: HHH

## 2024-05-01 ASSESSMENT — ENCOUNTER SYMPTOMS
PERSON REPORTING PAIN: PATIENT
PAIN LOCATION: LEFT LEG
APPETITE LEVEL: GOOD
SUBJECTIVE PAIN PROGRESSION: UNCHANGED
PAIN: 1
PAIN LOCATION - PAIN SEVERITY: 6/10
LIMITED RANGE OF MOTION: 1
CHANGE IN APPETITE: UNCHANGED

## 2024-05-02 DIAGNOSIS — M54.50 CHRONIC RIGHT-SIDED LOW BACK PAIN WITHOUT SCIATICA: ICD-10-CM

## 2024-05-02 DIAGNOSIS — I83.009 VENOUS ULCER (MULTI): ICD-10-CM

## 2024-05-02 DIAGNOSIS — G89.29 CHRONIC RIGHT-SIDED LOW BACK PAIN WITHOUT SCIATICA: ICD-10-CM

## 2024-05-02 DIAGNOSIS — L97.221 CHRONIC ULCER OF LEFT CALF LIMITED TO BREAKDOWN OF SKIN (MULTI): ICD-10-CM

## 2024-05-02 DIAGNOSIS — L97.909 VENOUS ULCER (MULTI): ICD-10-CM

## 2024-05-02 PROCEDURE — 1090000002 HH PPS REVENUE DEBIT

## 2024-05-02 PROCEDURE — 1090000001 HH PPS REVENUE CREDIT

## 2024-05-03 ENCOUNTER — HOME CARE VISIT (OUTPATIENT)
Dept: HOME HEALTH SERVICES | Facility: HOME HEALTH | Age: 59
End: 2024-05-03
Payer: MEDICARE

## 2024-05-03 VITALS
HEART RATE: 102 BPM | DIASTOLIC BLOOD PRESSURE: 74 MMHG | OXYGEN SATURATION: 97 % | TEMPERATURE: 97.7 F | RESPIRATION RATE: 18 BRPM | SYSTOLIC BLOOD PRESSURE: 138 MMHG

## 2024-05-03 PROCEDURE — G0299 HHS/HOSPICE OF RN EA 15 MIN: HCPCS | Mod: HHH

## 2024-05-03 PROCEDURE — 1090000001 HH PPS REVENUE CREDIT

## 2024-05-03 PROCEDURE — 1090000002 HH PPS REVENUE DEBIT

## 2024-05-03 ASSESSMENT — ACTIVITIES OF DAILY LIVING (ADL)
HOME_HEALTH_OASIS: 03
OASIS_M1830: 01

## 2024-05-03 ASSESSMENT — ENCOUNTER SYMPTOMS
PAIN: 1
PAIN LOCATION - PAIN SEVERITY: 6/10
PAIN LOCATION: LEFT LEG
PERSON REPORTING PAIN: PATIENT

## 2024-05-13 ENCOUNTER — OFFICE VISIT (OUTPATIENT)
Dept: WOUND CARE | Facility: CLINIC | Age: 59
End: 2024-05-13
Payer: MEDICARE

## 2024-05-13 DIAGNOSIS — B37.9 CANDIDIASIS: Primary | ICD-10-CM

## 2024-05-13 PROCEDURE — 11042 DBRDMT SUBQ TIS 1ST 20SQCM/<: CPT

## 2024-05-13 PROCEDURE — 99213 OFFICE O/P EST LOW 20 MIN: CPT | Mod: 25

## 2024-05-13 RX ORDER — FLUCONAZOLE 150 MG/1
150 TABLET ORAL DAILY
Qty: 3 TABLET | Refills: 0 | Status: SHIPPED | OUTPATIENT
Start: 2024-05-13 | End: 2024-05-16

## 2024-05-13 RX ORDER — CLOTRIMAZOLE AND BETAMETHASONE DIPROPIONATE 10; .64 MG/G; MG/G
1 CREAM TOPICAL 2 TIMES DAILY
Qty: 15 G | Refills: 0 | Status: SHIPPED | OUTPATIENT
Start: 2024-05-13

## 2024-05-20 ENCOUNTER — APPOINTMENT (OUTPATIENT)
Dept: WOUND CARE | Facility: CLINIC | Age: 59
End: 2024-05-20
Payer: MEDICARE

## 2024-05-21 ENCOUNTER — APPOINTMENT (OUTPATIENT)
Dept: WOUND CARE | Facility: CLINIC | Age: 59
End: 2024-05-21
Payer: MEDICARE

## 2024-05-24 ENCOUNTER — OFFICE VISIT (OUTPATIENT)
Dept: WOUND CARE | Facility: CLINIC | Age: 59
End: 2024-05-24
Payer: MEDICARE

## 2024-05-24 PROCEDURE — 11042 DBRDMT SUBQ TIS 1ST 20SQCM/<: CPT

## 2024-05-31 ENCOUNTER — OFFICE VISIT (OUTPATIENT)
Dept: WOUND CARE | Facility: CLINIC | Age: 59
End: 2024-05-31
Payer: MEDICARE

## 2024-05-31 PROCEDURE — 11042 DBRDMT SUBQ TIS 1ST 20SQCM/<: CPT

## 2024-06-10 ENCOUNTER — OFFICE VISIT (OUTPATIENT)
Dept: WOUND CARE | Facility: CLINIC | Age: 59
End: 2024-06-10
Payer: MEDICARE

## 2024-06-10 PROCEDURE — 11042 DBRDMT SUBQ TIS 1ST 20SQCM/<: CPT

## 2024-06-10 PROCEDURE — 11042 DBRDMT SUBQ TIS 1ST 20SQCM/<: CPT | Performed by: NURSE PRACTITIONER

## 2024-06-17 ENCOUNTER — OFFICE VISIT (OUTPATIENT)
Dept: WOUND CARE | Facility: CLINIC | Age: 59
End: 2024-06-17
Payer: MEDICARE

## 2024-06-17 PROCEDURE — 11042 DBRDMT SUBQ TIS 1ST 20SQCM/<: CPT

## 2024-06-17 PROCEDURE — 11042 DBRDMT SUBQ TIS 1ST 20SQCM/<: CPT | Performed by: NURSE PRACTITIONER

## 2024-06-17 PROCEDURE — 99213 OFFICE O/P EST LOW 20 MIN: CPT | Performed by: NURSE PRACTITIONER

## 2024-06-24 ENCOUNTER — OFFICE VISIT (OUTPATIENT)
Dept: WOUND CARE | Facility: CLINIC | Age: 59
End: 2024-06-24
Payer: MEDICARE

## 2024-06-24 PROCEDURE — 11042 DBRDMT SUBQ TIS 1ST 20SQCM/<: CPT

## 2024-06-24 PROCEDURE — 11042 DBRDMT SUBQ TIS 1ST 20SQCM/<: CPT | Performed by: NURSE PRACTITIONER

## 2024-06-24 PROCEDURE — 99213 OFFICE O/P EST LOW 20 MIN: CPT | Performed by: NURSE PRACTITIONER

## 2024-07-01 ENCOUNTER — APPOINTMENT (OUTPATIENT)
Dept: WOUND CARE | Facility: CLINIC | Age: 59
End: 2024-07-01
Payer: MEDICARE

## 2024-07-08 ENCOUNTER — OFFICE VISIT (OUTPATIENT)
Dept: WOUND CARE | Facility: CLINIC | Age: 59
End: 2024-07-08
Payer: MEDICARE

## 2024-07-08 PROCEDURE — 11042 DBRDMT SUBQ TIS 1ST 20SQCM/<: CPT

## 2024-07-08 PROCEDURE — 11042 DBRDMT SUBQ TIS 1ST 20SQCM/<: CPT | Performed by: NURSE PRACTITIONER

## 2024-07-15 ENCOUNTER — OFFICE VISIT (OUTPATIENT)
Dept: WOUND CARE | Facility: CLINIC | Age: 59
End: 2024-07-15
Payer: MEDICARE

## 2024-07-15 PROCEDURE — 11042 DBRDMT SUBQ TIS 1ST 20SQCM/<: CPT | Performed by: NURSE PRACTITIONER

## 2024-07-15 PROCEDURE — 99213 OFFICE O/P EST LOW 20 MIN: CPT | Performed by: NURSE PRACTITIONER

## 2024-07-15 PROCEDURE — 11042 DBRDMT SUBQ TIS 1ST 20SQCM/<: CPT

## 2024-07-22 ENCOUNTER — OFFICE VISIT (OUTPATIENT)
Dept: WOUND CARE | Facility: CLINIC | Age: 59
End: 2024-07-22
Payer: MEDICARE

## 2024-07-22 PROCEDURE — 11042 DBRDMT SUBQ TIS 1ST 20SQCM/<: CPT

## 2024-07-22 PROCEDURE — 11042 DBRDMT SUBQ TIS 1ST 20SQCM/<: CPT | Performed by: NURSE PRACTITIONER

## 2024-07-22 PROCEDURE — 99213 OFFICE O/P EST LOW 20 MIN: CPT | Performed by: NURSE PRACTITIONER

## 2024-07-29 ENCOUNTER — OFFICE VISIT (OUTPATIENT)
Dept: WOUND CARE | Facility: CLINIC | Age: 59
End: 2024-07-29
Payer: MEDICARE

## 2024-07-29 DIAGNOSIS — B37.9 CANDIDIASIS: Primary | ICD-10-CM

## 2024-07-29 PROCEDURE — 11042 DBRDMT SUBQ TIS 1ST 20SQCM/<: CPT

## 2024-07-29 PROCEDURE — 99213 OFFICE O/P EST LOW 20 MIN: CPT | Performed by: NURSE PRACTITIONER

## 2024-07-29 PROCEDURE — 11042 DBRDMT SUBQ TIS 1ST 20SQCM/<: CPT | Performed by: NURSE PRACTITIONER

## 2024-07-29 RX ORDER — FLUCONAZOLE 150 MG/1
150 TABLET ORAL DAILY
Qty: 3 TABLET | Refills: 0 | Status: SHIPPED | OUTPATIENT
Start: 2024-07-29 | End: 2024-08-01

## 2024-08-05 ENCOUNTER — OFFICE VISIT (OUTPATIENT)
Dept: WOUND CARE | Facility: CLINIC | Age: 59
End: 2024-08-05
Payer: MEDICARE

## 2024-08-05 DIAGNOSIS — I89.0 LYMPHEDEMA OF BOTH LOWER EXTREMITIES: Primary | ICD-10-CM

## 2024-08-05 PROCEDURE — 11042 DBRDMT SUBQ TIS 1ST 20SQCM/<: CPT

## 2024-08-05 PROCEDURE — 11042 DBRDMT SUBQ TIS 1ST 20SQCM/<: CPT | Performed by: NURSE PRACTITIONER

## 2024-08-05 PROCEDURE — 99213 OFFICE O/P EST LOW 20 MIN: CPT | Performed by: NURSE PRACTITIONER

## 2024-08-12 ENCOUNTER — APPOINTMENT (OUTPATIENT)
Dept: WOUND CARE | Facility: CLINIC | Age: 59
End: 2024-08-12
Payer: MEDICARE

## 2024-08-19 ENCOUNTER — OFFICE VISIT (OUTPATIENT)
Dept: WOUND CARE | Facility: CLINIC | Age: 59
End: 2024-08-19
Payer: MEDICARE

## 2024-08-19 PROCEDURE — 11042 DBRDMT SUBQ TIS 1ST 20SQCM/<: CPT

## 2024-08-19 PROCEDURE — 11042 DBRDMT SUBQ TIS 1ST 20SQCM/<: CPT | Performed by: NURSE PRACTITIONER

## 2024-08-26 ENCOUNTER — OFFICE VISIT (OUTPATIENT)
Dept: WOUND CARE | Facility: CLINIC | Age: 59
End: 2024-08-26
Payer: MEDICARE

## 2024-08-26 PROCEDURE — 15271 SKIN SUB GRAFT TRNK/ARM/LEG: CPT

## 2024-08-26 PROCEDURE — 15271 SKIN SUB GRAFT TRNK/ARM/LEG: CPT | Performed by: NURSE PRACTITIONER

## 2024-09-09 ENCOUNTER — APPOINTMENT (OUTPATIENT)
Dept: PHYSICAL THERAPY | Facility: HOSPITAL | Age: 59
End: 2024-09-09
Payer: MEDICARE

## 2024-09-09 ENCOUNTER — OFFICE VISIT (OUTPATIENT)
Dept: WOUND CARE | Facility: CLINIC | Age: 59
End: 2024-09-09
Payer: MEDICARE

## 2024-09-09 DIAGNOSIS — L97.222 NON-PRESSURE CHRONIC ULCER OF LEFT CALF WITH FAT LAYER EXPOSED (MULTI): ICD-10-CM

## 2024-09-09 DIAGNOSIS — B37.9 CANDIDIASIS: Primary | ICD-10-CM

## 2024-09-09 DIAGNOSIS — L03.90 WOUND CELLULITIS: ICD-10-CM

## 2024-09-09 PROCEDURE — 99214 OFFICE O/P EST MOD 30 MIN: CPT | Performed by: NURSE PRACTITIONER

## 2024-09-09 PROCEDURE — 11042 DBRDMT SUBQ TIS 1ST 20SQCM/<: CPT | Performed by: NURSE PRACTITIONER

## 2024-09-09 PROCEDURE — 11042 DBRDMT SUBQ TIS 1ST 20SQCM/<: CPT

## 2024-09-09 PROCEDURE — 87077 CULTURE AEROBIC IDENTIFY: CPT | Performed by: NURSE PRACTITIONER

## 2024-09-09 RX ORDER — CEFADROXIL 500 MG/1
500 CAPSULE ORAL 2 TIMES DAILY
Qty: 20 CAPSULE | Refills: 0 | Status: SHIPPED | OUTPATIENT
Start: 2024-09-09 | End: 2024-09-19

## 2024-09-09 RX ORDER — FLUCONAZOLE 150 MG/1
150 TABLET ORAL DAILY
Qty: 3 TABLET | Refills: 0 | Status: SHIPPED | OUTPATIENT
Start: 2024-09-09 | End: 2024-09-12

## 2024-09-13 LAB
BACTERIA SPEC CULT: ABNORMAL
BACTERIA SPEC CULT: ABNORMAL
GRAM STN SPEC: ABNORMAL
GRAM STN SPEC: ABNORMAL

## 2024-09-16 ENCOUNTER — APPOINTMENT (OUTPATIENT)
Dept: WOUND CARE | Facility: CLINIC | Age: 59
End: 2024-09-16
Payer: MEDICARE

## 2024-09-17 ENCOUNTER — LAB (OUTPATIENT)
Dept: LAB | Facility: LAB | Age: 59
End: 2024-09-17
Payer: MEDICARE

## 2024-09-17 ENCOUNTER — APPOINTMENT (OUTPATIENT)
Dept: PRIMARY CARE | Facility: CLINIC | Age: 59
End: 2024-09-17
Payer: MEDICARE

## 2024-09-17 VITALS
DIASTOLIC BLOOD PRESSURE: 87 MMHG | HEART RATE: 93 BPM | SYSTOLIC BLOOD PRESSURE: 151 MMHG | WEIGHT: 293 LBS | OXYGEN SATURATION: 93 % | BODY MASS INDEX: 69.09 KG/M2

## 2024-09-17 DIAGNOSIS — I89.0 LYMPHEDEMA: Primary | ICD-10-CM

## 2024-09-17 DIAGNOSIS — G62.9 NEUROPATHY: ICD-10-CM

## 2024-09-17 DIAGNOSIS — F41.9 ANXIETY: ICD-10-CM

## 2024-09-17 DIAGNOSIS — D64.9 ANEMIA, UNSPECIFIED TYPE: ICD-10-CM

## 2024-09-17 DIAGNOSIS — I89.0 LYMPHEDEMA: ICD-10-CM

## 2024-09-17 LAB
ERYTHROCYTE [DISTWIDTH] IN BLOOD BY AUTOMATED COUNT: 17.3 % (ref 11.5–14.5)
HCT VFR BLD AUTO: 39.4 % (ref 36–46)
HGB BLD-MCNC: 11.7 G/DL (ref 12–16)
MCH RBC QN AUTO: 26.1 PG (ref 26–34)
MCHC RBC AUTO-ENTMCNC: 29.7 G/DL (ref 32–36)
MCV RBC AUTO: 88 FL (ref 80–100)
NRBC BLD-RTO: 0 /100 WBCS (ref 0–0)
PLATELET # BLD AUTO: 271 X10*3/UL (ref 150–450)
RBC # BLD AUTO: 4.48 X10*6/UL (ref 4–5.2)
WBC # BLD AUTO: 7.7 X10*3/UL (ref 4.4–11.3)

## 2024-09-17 PROCEDURE — 85027 COMPLETE CBC AUTOMATED: CPT

## 2024-09-17 PROCEDURE — 99214 OFFICE O/P EST MOD 30 MIN: CPT | Performed by: FAMILY MEDICINE

## 2024-09-17 PROCEDURE — 80053 COMPREHEN METABOLIC PANEL: CPT

## 2024-09-17 PROCEDURE — 83540 ASSAY OF IRON: CPT

## 2024-09-17 PROCEDURE — 82607 VITAMIN B-12: CPT

## 2024-09-17 PROCEDURE — 36415 COLL VENOUS BLD VENIPUNCTURE: CPT

## 2024-09-17 PROCEDURE — 90656 IIV3 VACC NO PRSV 0.5 ML IM: CPT | Performed by: FAMILY MEDICINE

## 2024-09-17 PROCEDURE — 82746 ASSAY OF FOLIC ACID SERUM: CPT

## 2024-09-17 PROCEDURE — G0008 ADMIN INFLUENZA VIRUS VAC: HCPCS | Performed by: FAMILY MEDICINE

## 2024-09-17 PROCEDURE — 83550 IRON BINDING TEST: CPT

## 2024-09-17 RX ORDER — FUROSEMIDE 40 MG/1
40 TABLET ORAL DAILY
Qty: 30 TABLET | Refills: 11 | Status: SHIPPED | OUTPATIENT
Start: 2024-09-17 | End: 2025-09-17

## 2024-09-17 RX ORDER — GABAPENTIN 300 MG/1
CAPSULE ORAL
Qty: 360 CAPSULE | Refills: 1 | Status: SHIPPED | OUTPATIENT
Start: 2024-09-17

## 2024-09-17 RX ORDER — BUSPIRONE HYDROCHLORIDE 5 MG/1
5 TABLET ORAL 2 TIMES DAILY
Qty: 60 TABLET | Refills: 2 | Status: SHIPPED | OUTPATIENT
Start: 2024-09-17 | End: 2025-09-17

## 2024-09-17 ASSESSMENT — ENCOUNTER SYMPTOMS
CONSTITUTIONAL NEGATIVE: 1
MYALGIAS: 1
GASTROINTESTINAL NEGATIVE: 1
CARDIOVASCULAR NEGATIVE: 1
ARTHRALGIAS: 1
RESPIRATORY NEGATIVE: 1

## 2024-09-17 NOTE — PROGRESS NOTES
Subjective   Patient ID: Patricia Mckeon is a 59 y.o. female who presents for Wound Check (Left calf) and Med Refill.  Wound Check    Med Refill  Associated symptoms include arthralgias and myalgias.   Patient has significant swelling in her lower extremities to the point where she is seeing multiple other physicians vascular cardiology patient's lower extremities are weeping.  lymphedema  Review of Systems   Constitutional: Negative.    HENT: Negative.     Respiratory: Negative.     Cardiovascular: Negative.    Gastrointestinal: Negative.    Genitourinary: Negative.    Musculoskeletal:  Positive for arthralgias and myalgias.       Objective   Physical ExamConstitutional:       Appearance: Normal appearance.   HENT:      Head: Normocephalic and atraumatic.      Nose: Nose normal.      Mouth/Throat:      Mouth: Mucous membranes are moist.   Eyes:      Extraocular Movements: Extraocular movements intact.      Pupils: Pupils are equal, round, and reactive to light.   Cardiovascular:      Rate and Rhythm: Normal rate and regular rhythm.   Pulmonary:      Effort: Pulmonary effort is normal.      Breath sounds: Normal breath sounds.   Musculoskeletal:         General: Swelling, tenderness and deformity present.      Right lower leg: Edema present.      Left lower leg: Edema present.   Skin: Patient having some breakdown of the skin in the lower extremities recently had some type of plastic surgery    Assessment/Plan            Ramesh Rothman DO 09/17/24 11:12 AM

## 2024-09-18 LAB
ALBUMIN SERPL BCP-MCNC: 3.9 G/DL (ref 3.4–5)
ALP SERPL-CCNC: 51 U/L (ref 33–110)
ALT SERPL W P-5'-P-CCNC: 9 U/L (ref 7–45)
ANION GAP SERPL CALC-SCNC: 15 MMOL/L (ref 10–20)
AST SERPL W P-5'-P-CCNC: 15 U/L (ref 9–39)
BILIRUB SERPL-MCNC: 0.5 MG/DL (ref 0–1.2)
BUN SERPL-MCNC: 16 MG/DL (ref 6–23)
CALCIUM SERPL-MCNC: 9.3 MG/DL (ref 8.6–10.6)
CHLORIDE SERPL-SCNC: 104 MMOL/L (ref 98–107)
CO2 SERPL-SCNC: 27 MMOL/L (ref 21–32)
CREAT SERPL-MCNC: 0.92 MG/DL (ref 0.5–1.05)
EGFRCR SERPLBLD CKD-EPI 2021: 73 ML/MIN/1.73M*2
FOLATE SERPL-MCNC: 4.4 NG/ML
GLUCOSE SERPL-MCNC: 109 MG/DL (ref 74–99)
IRON SATN MFR SERPL: 9 % (ref 25–45)
IRON SERPL-MCNC: 27 UG/DL (ref 35–150)
POTASSIUM SERPL-SCNC: 4.6 MMOL/L (ref 3.5–5.3)
PROT SERPL-MCNC: 7.6 G/DL (ref 6.4–8.2)
SODIUM SERPL-SCNC: 141 MMOL/L (ref 136–145)
TIBC SERPL-MCNC: 293 UG/DL (ref 240–445)
UIBC SERPL-MCNC: 266 UG/DL (ref 110–370)
VIT B12 SERPL-MCNC: 242 PG/ML (ref 211–911)

## 2024-09-23 ENCOUNTER — OFFICE VISIT (OUTPATIENT)
Dept: WOUND CARE | Facility: CLINIC | Age: 59
End: 2024-09-23
Payer: MEDICARE

## 2024-09-23 DIAGNOSIS — L03.90 WOUND CELLULITIS: Primary | ICD-10-CM

## 2024-09-23 PROCEDURE — 11045 DBRDMT SUBQ TISS EACH ADDL: CPT

## 2024-09-23 PROCEDURE — 11042 DBRDMT SUBQ TIS 1ST 20SQCM/<: CPT

## 2024-09-23 PROCEDURE — 11042 DBRDMT SUBQ TIS 1ST 20SQCM/<: CPT | Performed by: NURSE PRACTITIONER

## 2024-09-23 PROCEDURE — 11045 DBRDMT SUBQ TISS EACH ADDL: CPT | Performed by: NURSE PRACTITIONER

## 2024-09-23 RX ORDER — CIPROFLOXACIN 500 MG/1
500 TABLET ORAL 2 TIMES DAILY
Qty: 20 TABLET | Refills: 0 | Status: SHIPPED | OUTPATIENT
Start: 2024-09-23 | End: 2024-10-03

## 2024-09-30 ENCOUNTER — APPOINTMENT (OUTPATIENT)
Dept: WOUND CARE | Facility: CLINIC | Age: 59
End: 2024-09-30
Payer: MEDICARE

## 2024-10-07 ENCOUNTER — OFFICE VISIT (OUTPATIENT)
Dept: WOUND CARE | Facility: CLINIC | Age: 59
End: 2024-10-07
Payer: MEDICARE

## 2024-10-07 PROCEDURE — 11042 DBRDMT SUBQ TIS 1ST 20SQCM/<: CPT | Performed by: NURSE PRACTITIONER

## 2024-10-07 PROCEDURE — 11042 DBRDMT SUBQ TIS 1ST 20SQCM/<: CPT

## 2024-10-07 PROCEDURE — 11045 DBRDMT SUBQ TISS EACH ADDL: CPT | Performed by: NURSE PRACTITIONER

## 2024-10-07 PROCEDURE — 11045 DBRDMT SUBQ TISS EACH ADDL: CPT

## 2024-10-07 PROCEDURE — 99213 OFFICE O/P EST LOW 20 MIN: CPT | Performed by: NURSE PRACTITIONER

## 2024-10-10 ENCOUNTER — EVALUATION (OUTPATIENT)
Dept: PHYSICAL THERAPY | Facility: HOSPITAL | Age: 59
End: 2024-10-10
Payer: MEDICARE

## 2024-10-10 DIAGNOSIS — E66.9 OBESITY, UNSPECIFIED: ICD-10-CM

## 2024-10-10 DIAGNOSIS — L97.221 CHRONIC ULCER OF LEFT CALF LIMITED TO BREAKDOWN OF SKIN: ICD-10-CM

## 2024-10-10 DIAGNOSIS — I87.2 VENOUS INSUFFICIENCY: ICD-10-CM

## 2024-10-10 DIAGNOSIS — I89.0 LYMPHEDEMA OF BOTH LOWER EXTREMITIES: Primary | ICD-10-CM

## 2024-10-10 PROCEDURE — 97163 PT EVAL HIGH COMPLEX 45 MIN: CPT | Mod: GP | Performed by: PHYSICAL THERAPIST

## 2024-10-10 PROCEDURE — 97140 MANUAL THERAPY 1/> REGIONS: CPT | Mod: GP | Performed by: PHYSICAL THERAPIST

## 2024-10-10 PROCEDURE — 97535 SELF CARE MNGMENT TRAINING: CPT | Mod: GP | Performed by: PHYSICAL THERAPIST

## 2024-10-10 ASSESSMENT — ENCOUNTER SYMPTOMS
DEPRESSION: 0
OCCASIONAL FEELINGS OF UNSTEADINESS: 0
LOSS OF SENSATION IN FEET: 1

## 2024-10-14 ENCOUNTER — OFFICE VISIT (OUTPATIENT)
Dept: WOUND CARE | Facility: CLINIC | Age: 59
End: 2024-10-14
Payer: MEDICARE

## 2024-10-14 PROCEDURE — 11042 DBRDMT SUBQ TIS 1ST 20SQCM/<: CPT

## 2024-10-14 PROCEDURE — 11042 DBRDMT SUBQ TIS 1ST 20SQCM/<: CPT | Performed by: NURSE PRACTITIONER

## 2024-10-21 ENCOUNTER — OFFICE VISIT (OUTPATIENT)
Dept: WOUND CARE | Facility: CLINIC | Age: 59
End: 2024-10-21
Payer: MEDICARE

## 2024-10-21 PROCEDURE — 11042 DBRDMT SUBQ TIS 1ST 20SQCM/<: CPT

## 2024-10-21 PROCEDURE — 11042 DBRDMT SUBQ TIS 1ST 20SQCM/<: CPT | Performed by: NURSE PRACTITIONER

## 2024-10-21 PROCEDURE — 11045 DBRDMT SUBQ TISS EACH ADDL: CPT

## 2024-10-21 PROCEDURE — 11045 DBRDMT SUBQ TISS EACH ADDL: CPT | Performed by: NURSE PRACTITIONER

## 2024-10-28 ENCOUNTER — OFFICE VISIT (OUTPATIENT)
Dept: WOUND CARE | Facility: CLINIC | Age: 59
End: 2024-10-28
Payer: MEDICARE

## 2024-10-28 PROCEDURE — 11042 DBRDMT SUBQ TIS 1ST 20SQCM/<: CPT

## 2024-10-28 PROCEDURE — 11042 DBRDMT SUBQ TIS 1ST 20SQCM/<: CPT | Performed by: NURSE PRACTITIONER

## 2024-11-04 ENCOUNTER — APPOINTMENT (OUTPATIENT)
Dept: WOUND CARE | Facility: CLINIC | Age: 59
End: 2024-11-04
Payer: MEDICAID

## 2024-11-11 ENCOUNTER — OFFICE VISIT (OUTPATIENT)
Dept: WOUND CARE | Facility: CLINIC | Age: 59
End: 2024-11-11
Payer: MEDICARE

## 2024-11-11 DIAGNOSIS — L97.222 NON-PRESSURE CHRONIC ULCER OF LEFT CALF WITH FAT LAYER EXPOSED (MULTI): ICD-10-CM

## 2024-11-11 PROCEDURE — 11045 DBRDMT SUBQ TISS EACH ADDL: CPT

## 2024-11-11 PROCEDURE — 11042 DBRDMT SUBQ TIS 1ST 20SQCM/<: CPT | Performed by: NURSE PRACTITIONER

## 2024-11-11 PROCEDURE — 11045 DBRDMT SUBQ TISS EACH ADDL: CPT | Performed by: NURSE PRACTITIONER

## 2024-11-11 PROCEDURE — 87077 CULTURE AEROBIC IDENTIFY: CPT | Performed by: NURSE PRACTITIONER

## 2024-11-11 PROCEDURE — 11042 DBRDMT SUBQ TIS 1ST 20SQCM/<: CPT

## 2024-11-12 ENCOUNTER — APPOINTMENT (OUTPATIENT)
Dept: PHYSICAL THERAPY | Facility: HOSPITAL | Age: 59
End: 2024-11-12
Payer: MEDICARE

## 2024-11-13 ENCOUNTER — APPOINTMENT (OUTPATIENT)
Dept: PHYSICAL THERAPY | Facility: HOSPITAL | Age: 59
End: 2024-11-13
Payer: MEDICARE

## 2024-11-15 ENCOUNTER — APPOINTMENT (OUTPATIENT)
Dept: PHYSICAL THERAPY | Facility: HOSPITAL | Age: 59
End: 2024-11-15
Payer: MEDICARE

## 2024-11-15 PROCEDURE — 99285 EMERGENCY DEPT VISIT HI MDM: CPT | Performed by: STUDENT IN AN ORGANIZED HEALTH CARE EDUCATION/TRAINING PROGRAM

## 2024-11-15 PROCEDURE — 99285 EMERGENCY DEPT VISIT HI MDM: CPT

## 2024-11-15 ASSESSMENT — PAIN DESCRIPTION - PROGRESSION: CLINICAL_PROGRESSION: NOT CHANGED

## 2024-11-15 ASSESSMENT — PAIN - FUNCTIONAL ASSESSMENT: PAIN_FUNCTIONAL_ASSESSMENT: 0-10

## 2024-11-15 ASSESSMENT — PAIN DESCRIPTION - ORIENTATION: ORIENTATION: LEFT

## 2024-11-15 ASSESSMENT — PAIN SCALES - GENERAL: PAINLEVEL_OUTOF10: 7

## 2024-11-15 ASSESSMENT — PAIN DESCRIPTION - LOCATION: LOCATION: LEG

## 2024-11-16 ENCOUNTER — HOSPITAL ENCOUNTER (INPATIENT)
Facility: HOSPITAL | Age: 59
DRG: 603 | End: 2024-11-16
Attending: STUDENT IN AN ORGANIZED HEALTH CARE EDUCATION/TRAINING PROGRAM | Admitting: STUDENT IN AN ORGANIZED HEALTH CARE EDUCATION/TRAINING PROGRAM
Payer: MEDICARE

## 2024-11-16 ENCOUNTER — APPOINTMENT (OUTPATIENT)
Dept: RADIOLOGY | Facility: HOSPITAL | Age: 59
DRG: 603 | End: 2024-11-16
Payer: MEDICARE

## 2024-11-16 DIAGNOSIS — S81.802A WOUND OF LEFT LOWER EXTREMITY, INITIAL ENCOUNTER: Primary | ICD-10-CM

## 2024-11-16 DIAGNOSIS — F41.9 ANXIETY: ICD-10-CM

## 2024-11-16 DIAGNOSIS — L08.9 WOUND INFECTION: ICD-10-CM

## 2024-11-16 DIAGNOSIS — T14.8XXA WOUND INFECTION: ICD-10-CM

## 2024-11-16 LAB
ALBUMIN SERPL BCP-MCNC: 3.5 G/DL (ref 3.4–5)
ALBUMIN SERPL BCP-MCNC: 4.1 G/DL (ref 3.4–5)
ALP SERPL-CCNC: 48 U/L (ref 33–110)
ALP SERPL-CCNC: 59 U/L (ref 33–110)
ALT SERPL W P-5'-P-CCNC: 6 U/L (ref 7–45)
ALT SERPL W P-5'-P-CCNC: 8 U/L (ref 7–45)
ANION GAP BLDV CALCULATED.4IONS-SCNC: 10 MMOL/L (ref 10–25)
ANION GAP SERPL CALC-SCNC: 13 MMOL/L (ref 10–20)
ANION GAP SERPL CALC-SCNC: 13 MMOL/L (ref 10–20)
AST SERPL W P-5'-P-CCNC: 11 U/L (ref 9–39)
AST SERPL W P-5'-P-CCNC: 9 U/L (ref 9–39)
BASE EXCESS BLDV CALC-SCNC: 2.2 MMOL/L (ref -2–3)
BASOPHILS # BLD AUTO: 0.03 X10*3/UL (ref 0–0.1)
BASOPHILS NFR BLD AUTO: 0.4 %
BILIRUB SERPL-MCNC: 0.4 MG/DL (ref 0–1.2)
BILIRUB SERPL-MCNC: 0.5 MG/DL (ref 0–1.2)
BODY TEMPERATURE: 37 DEGREES CELSIUS
BUN SERPL-MCNC: 21 MG/DL (ref 6–23)
BUN SERPL-MCNC: 21 MG/DL (ref 6–23)
CA-I BLDV-SCNC: 1.2 MMOL/L (ref 1.1–1.33)
CALCIUM SERPL-MCNC: 8.7 MG/DL (ref 8.6–10.6)
CALCIUM SERPL-MCNC: 9.2 MG/DL (ref 8.6–10.6)
CHLORIDE BLDV-SCNC: 105 MMOL/L (ref 98–107)
CHLORIDE SERPL-SCNC: 104 MMOL/L (ref 98–107)
CHLORIDE SERPL-SCNC: 106 MMOL/L (ref 98–107)
CO2 SERPL-SCNC: 26 MMOL/L (ref 21–32)
CO2 SERPL-SCNC: 27 MMOL/L (ref 21–32)
CREAT SERPL-MCNC: 0.88 MG/DL (ref 0.5–1.05)
CREAT SERPL-MCNC: 0.92 MG/DL (ref 0.5–1.05)
CRP SERPL-MCNC: 2.61 MG/DL
EGFRCR SERPLBLD CKD-EPI 2021: 73 ML/MIN/1.73M*2
EGFRCR SERPLBLD CKD-EPI 2021: 77 ML/MIN/1.73M*2
EOSINOPHIL # BLD AUTO: 0.18 X10*3/UL (ref 0–0.7)
EOSINOPHIL NFR BLD AUTO: 2.2 %
ERYTHROCYTE [DISTWIDTH] IN BLOOD BY AUTOMATED COUNT: 17 % (ref 11.5–14.5)
ERYTHROCYTE [DISTWIDTH] IN BLOOD BY AUTOMATED COUNT: 17.1 % (ref 11.5–14.5)
ERYTHROCYTE [SEDIMENTATION RATE] IN BLOOD BY WESTERGREN METHOD: 101 MM/H (ref 0–30)
GLUCOSE BLDV-MCNC: 126 MG/DL (ref 74–99)
GLUCOSE SERPL-MCNC: 107 MG/DL (ref 74–99)
GLUCOSE SERPL-MCNC: 115 MG/DL (ref 74–99)
HCO3 BLDV-SCNC: 28.7 MMOL/L (ref 22–26)
HCT VFR BLD AUTO: 33.1 % (ref 36–46)
HCT VFR BLD AUTO: 38 % (ref 36–46)
HCT VFR BLD EST: 37 % (ref 36–46)
HGB BLD-MCNC: 10.4 G/DL (ref 12–16)
HGB BLD-MCNC: 11.6 G/DL (ref 12–16)
HGB BLDV-MCNC: 12.4 G/DL (ref 12–16)
IMM GRANULOCYTES # BLD AUTO: 0.03 X10*3/UL (ref 0–0.7)
IMM GRANULOCYTES NFR BLD AUTO: 0.4 % (ref 0–0.9)
LACTATE BLDV-SCNC: 1.2 MMOL/L (ref 0.4–2)
LACTATE SERPL-SCNC: 1.3 MMOL/L (ref 0.4–2)
LYMPHOCYTES # BLD AUTO: 2.06 X10*3/UL (ref 1.2–4.8)
LYMPHOCYTES NFR BLD AUTO: 24.7 %
MAGNESIUM SERPL-MCNC: 2.14 MG/DL (ref 1.6–2.4)
MCH RBC QN AUTO: 26.1 PG (ref 26–34)
MCH RBC QN AUTO: 26.7 PG (ref 26–34)
MCHC RBC AUTO-ENTMCNC: 30.5 G/DL (ref 32–36)
MCHC RBC AUTO-ENTMCNC: 31.4 G/DL (ref 32–36)
MCV RBC AUTO: 85 FL (ref 80–100)
MCV RBC AUTO: 86 FL (ref 80–100)
MONOCYTES # BLD AUTO: 0.6 X10*3/UL (ref 0.1–1)
MONOCYTES NFR BLD AUTO: 7.2 %
NEUTROPHILS # BLD AUTO: 5.44 X10*3/UL (ref 1.2–7.7)
NEUTROPHILS NFR BLD AUTO: 65.1 %
NRBC BLD-RTO: 0 /100 WBCS (ref 0–0)
NRBC BLD-RTO: 0 /100 WBCS (ref 0–0)
OXYHGB MFR BLDV: 40.1 % (ref 45–75)
PCO2 BLDV: 52 MM HG (ref 41–51)
PH BLDV: 7.35 PH (ref 7.33–7.43)
PLATELET # BLD AUTO: 287 X10*3/UL (ref 150–450)
PLATELET # BLD AUTO: 334 X10*3/UL (ref 150–450)
PO2 BLDV: 26 MM HG (ref 35–45)
POTASSIUM BLDV-SCNC: 4.5 MMOL/L (ref 3.5–5.3)
POTASSIUM SERPL-SCNC: 3.8 MMOL/L (ref 3.5–5.3)
POTASSIUM SERPL-SCNC: 4.2 MMOL/L (ref 3.5–5.3)
PROT SERPL-MCNC: 7.1 G/DL (ref 6.4–8.2)
PROT SERPL-MCNC: 8.4 G/DL (ref 6.4–8.2)
RBC # BLD AUTO: 3.9 X10*6/UL (ref 4–5.2)
RBC # BLD AUTO: 4.44 X10*6/UL (ref 4–5.2)
SAO2 % BLDV: 41 % (ref 45–75)
SODIUM BLDV-SCNC: 139 MMOL/L (ref 136–145)
SODIUM SERPL-SCNC: 140 MMOL/L (ref 136–145)
SODIUM SERPL-SCNC: 141 MMOL/L (ref 136–145)
WBC # BLD AUTO: 6.8 X10*3/UL (ref 4.4–11.3)
WBC # BLD AUTO: 8.3 X10*3/UL (ref 4.4–11.3)

## 2024-11-16 PROCEDURE — 71045 X-RAY EXAM CHEST 1 VIEW: CPT | Performed by: RADIOLOGY

## 2024-11-16 PROCEDURE — 36415 COLL VENOUS BLD VENIPUNCTURE: CPT

## 2024-11-16 PROCEDURE — 96365 THER/PROPH/DIAG IV INF INIT: CPT

## 2024-11-16 PROCEDURE — 85025 COMPLETE CBC W/AUTO DIFF WBC: CPT

## 2024-11-16 PROCEDURE — 71045 X-RAY EXAM CHEST 1 VIEW: CPT

## 2024-11-16 PROCEDURE — 73590 X-RAY EXAM OF LOWER LEG: CPT | Mod: LT

## 2024-11-16 PROCEDURE — 2500000004 HC RX 250 GENERAL PHARMACY W/ HCPCS (ALT 636 FOR OP/ED)

## 2024-11-16 PROCEDURE — 85652 RBC SED RATE AUTOMATED: CPT

## 2024-11-16 PROCEDURE — 84132 ASSAY OF SERUM POTASSIUM: CPT

## 2024-11-16 PROCEDURE — 96375 TX/PRO/DX INJ NEW DRUG ADDON: CPT

## 2024-11-16 PROCEDURE — 83735 ASSAY OF MAGNESIUM: CPT

## 2024-11-16 PROCEDURE — 87040 BLOOD CULTURE FOR BACTERIA: CPT

## 2024-11-16 PROCEDURE — 2500000001 HC RX 250 WO HCPCS SELF ADMINISTERED DRUGS (ALT 637 FOR MEDICARE OP): Performed by: STUDENT IN AN ORGANIZED HEALTH CARE EDUCATION/TRAINING PROGRAM

## 2024-11-16 PROCEDURE — 1210000001 HC SEMI-PRIVATE ROOM DAILY

## 2024-11-16 PROCEDURE — 2500000001 HC RX 250 WO HCPCS SELF ADMINISTERED DRUGS (ALT 637 FOR MEDICARE OP)

## 2024-11-16 PROCEDURE — 86140 C-REACTIVE PROTEIN: CPT

## 2024-11-16 PROCEDURE — 85027 COMPLETE CBC AUTOMATED: CPT

## 2024-11-16 PROCEDURE — 73590 X-RAY EXAM OF LOWER LEG: CPT | Mod: LEFT SIDE | Performed by: STUDENT IN AN ORGANIZED HEALTH CARE EDUCATION/TRAINING PROGRAM

## 2024-11-16 PROCEDURE — 83605 ASSAY OF LACTIC ACID: CPT

## 2024-11-16 PROCEDURE — 99223 1ST HOSP IP/OBS HIGH 75: CPT

## 2024-11-16 RX ORDER — TRAMADOL HYDROCHLORIDE 50 MG/1
50 TABLET ORAL EVERY 6 HOURS PRN
Status: DISCONTINUED | OUTPATIENT
Start: 2024-11-16 | End: 2024-11-19 | Stop reason: HOSPADM

## 2024-11-16 RX ORDER — IBUPROFEN 600 MG/1
600 TABLET ORAL EVERY 8 HOURS PRN
Status: DISCONTINUED | OUTPATIENT
Start: 2024-11-16 | End: 2024-11-16

## 2024-11-16 RX ORDER — ACETAMINOPHEN 160 MG/5ML
650 SOLUTION ORAL EVERY 4 HOURS PRN
Status: DISCONTINUED | OUTPATIENT
Start: 2024-11-16 | End: 2024-11-16

## 2024-11-16 RX ORDER — ENOXAPARIN SODIUM 100 MG/ML
60 INJECTION SUBCUTANEOUS EVERY 12 HOURS SCHEDULED
Status: DISCONTINUED | OUTPATIENT
Start: 2024-11-16 | End: 2024-11-19 | Stop reason: HOSPADM

## 2024-11-16 RX ORDER — OXYCODONE HYDROCHLORIDE 5 MG/1
5 TABLET ORAL EVERY 6 HOURS PRN
Status: DISCONTINUED | OUTPATIENT
Start: 2024-11-16 | End: 2024-11-19 | Stop reason: HOSPADM

## 2024-11-16 RX ORDER — ACETAMINOPHEN 325 MG/1
650 TABLET ORAL EVERY 4 HOURS PRN
Status: DISCONTINUED | OUTPATIENT
Start: 2024-11-16 | End: 2024-11-19 | Stop reason: HOSPADM

## 2024-11-16 RX ORDER — ACETAMINOPHEN 650 MG/1
650 SUPPOSITORY RECTAL EVERY 4 HOURS PRN
Status: DISCONTINUED | OUTPATIENT
Start: 2024-11-16 | End: 2024-11-16

## 2024-11-16 RX ORDER — PANTOPRAZOLE SODIUM 40 MG/10ML
40 INJECTION, POWDER, LYOPHILIZED, FOR SOLUTION INTRAVENOUS
Status: DISCONTINUED | OUTPATIENT
Start: 2024-11-16 | End: 2024-11-19 | Stop reason: HOSPADM

## 2024-11-16 RX ORDER — PANTOPRAZOLE SODIUM 40 MG/1
40 TABLET, DELAYED RELEASE ORAL
Status: DISCONTINUED | OUTPATIENT
Start: 2024-11-16 | End: 2024-11-19 | Stop reason: HOSPADM

## 2024-11-16 RX ORDER — GABAPENTIN 300 MG/1
600 CAPSULE ORAL 2 TIMES DAILY
Status: DISCONTINUED | OUTPATIENT
Start: 2024-11-16 | End: 2024-11-16

## 2024-11-16 RX ORDER — FUROSEMIDE 40 MG/1
40 TABLET ORAL DAILY
Status: DISCONTINUED | OUTPATIENT
Start: 2024-11-16 | End: 2024-11-19 | Stop reason: HOSPADM

## 2024-11-16 RX ORDER — VANCOMYCIN 2 GRAM/500 ML IN 0.9 % SODIUM CHLORIDE INTRAVENOUS
2000 EVERY 24 HOURS
Status: DISCONTINUED | OUTPATIENT
Start: 2024-11-16 | End: 2024-11-17

## 2024-11-16 RX ORDER — ONDANSETRON HYDROCHLORIDE 2 MG/ML
4 INJECTION, SOLUTION INTRAVENOUS EVERY 8 HOURS PRN
Status: DISCONTINUED | OUTPATIENT
Start: 2024-11-16 | End: 2024-11-19 | Stop reason: HOSPADM

## 2024-11-16 RX ORDER — ONDANSETRON 4 MG/1
4 TABLET, FILM COATED ORAL EVERY 8 HOURS PRN
Status: DISCONTINUED | OUTPATIENT
Start: 2024-11-16 | End: 2024-11-19 | Stop reason: HOSPADM

## 2024-11-16 RX ORDER — POLYETHYLENE GLYCOL 3350 17 G/17G
17 POWDER, FOR SOLUTION ORAL DAILY
Status: DISCONTINUED | OUTPATIENT
Start: 2024-11-16 | End: 2024-11-19 | Stop reason: HOSPADM

## 2024-11-16 RX ORDER — GABAPENTIN 300 MG/1
300 CAPSULE ORAL DAILY
Status: DISCONTINUED | OUTPATIENT
Start: 2024-11-17 | End: 2024-11-19 | Stop reason: HOSPADM

## 2024-11-16 RX ORDER — BUSPIRONE HYDROCHLORIDE 5 MG/1
5 TABLET ORAL 2 TIMES DAILY
Status: DISCONTINUED | OUTPATIENT
Start: 2024-11-16 | End: 2024-11-19 | Stop reason: HOSPADM

## 2024-11-16 RX ORDER — HYDROXYZINE HYDROCHLORIDE 25 MG/1
25 TABLET, FILM COATED ORAL 3 TIMES DAILY PRN
Status: DISCONTINUED | OUTPATIENT
Start: 2024-11-16 | End: 2024-11-19 | Stop reason: HOSPADM

## 2024-11-16 RX ORDER — TALC
3 POWDER (GRAM) TOPICAL NIGHTLY PRN
Status: DISCONTINUED | OUTPATIENT
Start: 2024-11-16 | End: 2024-11-19 | Stop reason: HOSPADM

## 2024-11-16 RX ORDER — KETOROLAC TROMETHAMINE 30 MG/ML
30 INJECTION, SOLUTION INTRAMUSCULAR; INTRAVENOUS ONCE
Status: COMPLETED | OUTPATIENT
Start: 2024-11-16 | End: 2024-11-16

## 2024-11-16 RX ADMIN — FUROSEMIDE 40 MG: 40 TABLET ORAL at 09:28

## 2024-11-16 RX ADMIN — POLYETHYLENE GLYCOL 3350 17 G: 17 POWDER, FOR SOLUTION ORAL at 09:28

## 2024-11-16 RX ADMIN — KETOROLAC TROMETHAMINE 30 MG: 30 INJECTION, SOLUTION INTRAMUSCULAR; INTRAVENOUS at 02:26

## 2024-11-16 RX ADMIN — BUSPIRONE HYDROCHLORIDE 5 MG: 5 TABLET ORAL at 09:28

## 2024-11-16 RX ADMIN — ENOXAPARIN SODIUM 60 MG: 60 INJECTION SUBCUTANEOUS at 09:27

## 2024-11-16 RX ADMIN — GABAPENTIN 600 MG: 300 CAPSULE ORAL at 02:25

## 2024-11-16 RX ADMIN — PIPERACILLIN SODIUM AND TAZOBACTAM SODIUM 3.38 G: 3; .375 INJECTION, SOLUTION INTRAVENOUS at 01:49

## 2024-11-16 RX ADMIN — PIPERACILLIN SODIUM AND TAZOBACTAM SODIUM 3.38 G: 3; .375 INJECTION, SOLUTION INTRAVENOUS at 09:28

## 2024-11-16 RX ADMIN — BUSPIRONE HYDROCHLORIDE 5 MG: 5 TABLET ORAL at 20:25

## 2024-11-16 RX ADMIN — ACETAMINOPHEN 650 MG: 325 TABLET ORAL at 20:25

## 2024-11-16 RX ADMIN — PANTOPRAZOLE SODIUM 40 MG: 40 TABLET, DELAYED RELEASE ORAL at 06:50

## 2024-11-16 RX ADMIN — PIPERACILLIN SODIUM AND TAZOBACTAM SODIUM 3.38 G: 3; .375 INJECTION, SOLUTION INTRAVENOUS at 16:03

## 2024-11-16 RX ADMIN — Medication 2000 MG: at 06:50

## 2024-11-16 RX ADMIN — ENOXAPARIN SODIUM 60 MG: 60 INJECTION SUBCUTANEOUS at 20:25

## 2024-11-16 RX ADMIN — ACETAMINOPHEN 650 MG: 325 TABLET ORAL at 15:02

## 2024-11-16 RX ADMIN — OXYCODONE HYDROCHLORIDE 5 MG: 5 TABLET ORAL at 20:25

## 2024-11-16 RX ADMIN — OXYCODONE HYDROCHLORIDE 5 MG: 5 TABLET ORAL at 14:58

## 2024-11-16 SDOH — ECONOMIC STABILITY: HOUSING INSECURITY: IN THE PAST 12 MONTHS, HOW MANY TIMES HAVE YOU MOVED WHERE YOU WERE LIVING?: 0

## 2024-11-16 SDOH — SOCIAL STABILITY: SOCIAL INSECURITY
WITHIN THE LAST YEAR, HAVE YOU BEEN RAPED OR FORCED TO HAVE ANY KIND OF SEXUAL ACTIVITY BY YOUR PARTNER OR EX-PARTNER?: NO

## 2024-11-16 SDOH — SOCIAL STABILITY: SOCIAL INSECURITY: DO YOU FEEL ANYONE HAS EXPLOITED OR TAKEN ADVANTAGE OF YOU FINANCIALLY OR OF YOUR PERSONAL PROPERTY?: NO

## 2024-11-16 SDOH — SOCIAL STABILITY: SOCIAL INSECURITY: HAS ANYONE EVER THREATENED TO HURT YOUR FAMILY OR YOUR PETS?: NO

## 2024-11-16 SDOH — ECONOMIC STABILITY: FOOD INSECURITY: HOW HARD IS IT FOR YOU TO PAY FOR THE VERY BASICS LIKE FOOD, HOUSING, MEDICAL CARE, AND HEATING?: NOT HARD AT ALL

## 2024-11-16 SDOH — SOCIAL STABILITY: SOCIAL INSECURITY: WITHIN THE LAST YEAR, HAVE YOU BEEN HUMILIATED OR EMOTIONALLY ABUSED IN OTHER WAYS BY YOUR PARTNER OR EX-PARTNER?: NO

## 2024-11-16 SDOH — ECONOMIC STABILITY: FOOD INSECURITY: WITHIN THE PAST 12 MONTHS, THE FOOD YOU BOUGHT JUST DIDN'T LAST AND YOU DIDN'T HAVE MONEY TO GET MORE.: NEVER TRUE

## 2024-11-16 SDOH — SOCIAL STABILITY: SOCIAL INSECURITY: DO YOU FEEL UNSAFE GOING BACK TO THE PLACE WHERE YOU ARE LIVING?: NO

## 2024-11-16 SDOH — SOCIAL STABILITY: SOCIAL INSECURITY: WERE YOU ABLE TO COMPLETE ALL THE BEHAVIORAL HEALTH SCREENINGS?: YES

## 2024-11-16 SDOH — ECONOMIC STABILITY: HOUSING INSECURITY: AT ANY TIME IN THE PAST 12 MONTHS, WERE YOU HOMELESS OR LIVING IN A SHELTER (INCLUDING NOW)?: NO

## 2024-11-16 SDOH — SOCIAL STABILITY: SOCIAL INSECURITY
WITHIN THE LAST YEAR, HAVE YOU BEEN KICKED, HIT, SLAPPED, OR OTHERWISE PHYSICALLY HURT BY YOUR PARTNER OR EX-PARTNER?: NO

## 2024-11-16 SDOH — ECONOMIC STABILITY: FOOD INSECURITY: WITHIN THE PAST 12 MONTHS, YOU WORRIED THAT YOUR FOOD WOULD RUN OUT BEFORE YOU GOT THE MONEY TO BUY MORE.: NEVER TRUE

## 2024-11-16 SDOH — SOCIAL STABILITY: SOCIAL INSECURITY: WITHIN THE LAST YEAR, HAVE YOU BEEN AFRAID OF YOUR PARTNER OR EX-PARTNER?: NO

## 2024-11-16 SDOH — ECONOMIC STABILITY: INCOME INSECURITY: IN THE PAST 12 MONTHS HAS THE ELECTRIC, GAS, OIL, OR WATER COMPANY THREATENED TO SHUT OFF SERVICES IN YOUR HOME?: NO

## 2024-11-16 SDOH — SOCIAL STABILITY: SOCIAL INSECURITY: HAVE YOU HAD ANY THOUGHTS OF HARMING ANYONE ELSE?: NO

## 2024-11-16 SDOH — HEALTH STABILITY: PHYSICAL HEALTH: ON AVERAGE, HOW MANY DAYS PER WEEK DO YOU ENGAGE IN MODERATE TO STRENUOUS EXERCISE (LIKE A BRISK WALK)?: 0 DAYS

## 2024-11-16 SDOH — SOCIAL STABILITY: SOCIAL INSECURITY: ABUSE: ADULT

## 2024-11-16 SDOH — SOCIAL STABILITY: SOCIAL INSECURITY: ARE YOU OR HAVE YOU BEEN THREATENED OR ABUSED PHYSICALLY, EMOTIONALLY, OR SEXUALLY BY ANYONE?: NO

## 2024-11-16 SDOH — ECONOMIC STABILITY: HOUSING INSECURITY: IN THE LAST 12 MONTHS, WAS THERE A TIME WHEN YOU WERE NOT ABLE TO PAY THE MORTGAGE OR RENT ON TIME?: NO

## 2024-11-16 SDOH — HEALTH STABILITY: PHYSICAL HEALTH: ON AVERAGE, HOW MANY MINUTES DO YOU ENGAGE IN EXERCISE AT THIS LEVEL?: 0 MIN

## 2024-11-16 SDOH — SOCIAL STABILITY: SOCIAL INSECURITY: HAVE YOU HAD THOUGHTS OF HARMING ANYONE ELSE?: NO

## 2024-11-16 SDOH — SOCIAL STABILITY: SOCIAL INSECURITY: DOES ANYONE TRY TO KEEP YOU FROM HAVING/CONTACTING OTHER FRIENDS OR DOING THINGS OUTSIDE YOUR HOME?: NO

## 2024-11-16 SDOH — SOCIAL STABILITY: SOCIAL INSECURITY: ARE THERE ANY APPARENT SIGNS OF INJURIES/BEHAVIORS THAT COULD BE RELATED TO ABUSE/NEGLECT?: NO

## 2024-11-16 SDOH — ECONOMIC STABILITY: TRANSPORTATION INSECURITY: IN THE PAST 12 MONTHS, HAS LACK OF TRANSPORTATION KEPT YOU FROM MEDICAL APPOINTMENTS OR FROM GETTING MEDICATIONS?: NO

## 2024-11-16 ASSESSMENT — ACTIVITIES OF DAILY LIVING (ADL)
LACK_OF_TRANSPORTATION: NO
JUDGMENT_ADEQUATE_SAFELY_COMPLETE_DAILY_ACTIVITIES: YES
ADEQUATE_TO_COMPLETE_ADL: YES
LACK_OF_TRANSPORTATION: NO
GROOMING: INDEPENDENT
PATIENT'S MEMORY ADEQUATE TO SAFELY COMPLETE DAILY ACTIVITIES?: YES
LACK_OF_TRANSPORTATION: NO
DRESSING YOURSELF: INDEPENDENT
TOILETING: NEEDS ASSISTANCE
BATHING: INDEPENDENT
FEEDING YOURSELF: INDEPENDENT
WALKS IN HOME: INDEPENDENT
ASSISTIVE_DEVICE: EYEGLASSES;WALKER
HEARING - RIGHT EAR: FUNCTIONAL
HEARING - LEFT EAR: FUNCTIONAL

## 2024-11-16 ASSESSMENT — COGNITIVE AND FUNCTIONAL STATUS - GENERAL
WALKING IN HOSPITAL ROOM: A LITTLE
CLIMB 3 TO 5 STEPS WITH RAILING: A LOT
PATIENT BASELINE BEDBOUND: NO
TURNING FROM BACK TO SIDE WHILE IN FLAT BAD: A LITTLE
HELP NEEDED FOR BATHING: A LITTLE
DAILY ACTIVITIY SCORE: 23
MOVING TO AND FROM BED TO CHAIR: A LITTLE
MOBILITY SCORE: 19
HELP NEEDED FOR BATHING: A LITTLE
MOBILITY SCORE: 19
TURNING FROM BACK TO SIDE WHILE IN FLAT BAD: A LITTLE
MOVING TO AND FROM BED TO CHAIR: A LITTLE
MOBILITY SCORE: 19
MOVING TO AND FROM BED TO CHAIR: A LITTLE
HELP NEEDED FOR BATHING: A LITTLE
WALKING IN HOSPITAL ROOM: A LITTLE
TURNING FROM BACK TO SIDE WHILE IN FLAT BAD: A LITTLE
WALKING IN HOSPITAL ROOM: A LITTLE
DAILY ACTIVITIY SCORE: 23
CLIMB 3 TO 5 STEPS WITH RAILING: A LOT
DAILY ACTIVITIY SCORE: 23
CLIMB 3 TO 5 STEPS WITH RAILING: A LOT

## 2024-11-16 ASSESSMENT — PATIENT HEALTH QUESTIONNAIRE - PHQ9
SUM OF ALL RESPONSES TO PHQ9 QUESTIONS 1 & 2: 0
1. LITTLE INTEREST OR PLEASURE IN DOING THINGS: NOT AT ALL
2. FEELING DOWN, DEPRESSED OR HOPELESS: NOT AT ALL

## 2024-11-16 ASSESSMENT — PAIN SCALES - GENERAL
PAINLEVEL_OUTOF10: 7
PAINLEVEL_OUTOF10: 4
PAINLEVEL_OUTOF10: 6
PAINLEVEL_OUTOF10: 7
PAINLEVEL_OUTOF10: 0 - NO PAIN
PAINLEVEL_OUTOF10: 7

## 2024-11-16 ASSESSMENT — LIFESTYLE VARIABLES
AUDIT-C TOTAL SCORE: 0
AUDIT-C TOTAL SCORE: 0
HOW MANY STANDARD DRINKS CONTAINING ALCOHOL DO YOU HAVE ON A TYPICAL DAY: 1 OR 2
SUBSTANCE_ABUSE_PAST_12_MONTHS: NO
SKIP TO QUESTIONS 9-10: 1
HOW OFTEN DO YOU HAVE A DRINK CONTAINING ALCOHOL: NEVER
PRESCIPTION_ABUSE_PAST_12_MONTHS: NO
HOW OFTEN DO YOU HAVE 6 OR MORE DRINKS ON ONE OCCASION: NEVER

## 2024-11-16 ASSESSMENT — PAIN DESCRIPTION - ORIENTATION: ORIENTATION: LEFT

## 2024-11-16 ASSESSMENT — PAIN DESCRIPTION - DESCRIPTORS: DESCRIPTORS: ACHING;BURNING

## 2024-11-16 ASSESSMENT — ENCOUNTER SYMPTOMS
ARTHRALGIAS: 1
WOUND: 1

## 2024-11-16 ASSESSMENT — PAIN - FUNCTIONAL ASSESSMENT
PAIN_FUNCTIONAL_ASSESSMENT: 0-10

## 2024-11-16 ASSESSMENT — PAIN DESCRIPTION - LOCATION
LOCATION: LEG
LOCATION: GENERALIZED

## 2024-11-16 NOTE — ED PROVIDER NOTES
History of Present Illness     History provided by: Patient   Limitations to History: None   External Records Reviewed with Brief Summary: I reviewed the patient's outpatient wound cultures from 11/11/2024 wherePseudomonas and Corynebacterium striatum was grown.  Susceptible to vancomycin and Zosyn.    HPI:  Patricia Mckeon is a 59 y.o. female with past medical history of multiple desmoid tumors, chronic lymphedema, history of recurrent cellulitis, necrotizing fasciitis most recently in 2020, anxiety, presenting to the emergency department today with concern for positive wound cultures.  She states that she has been having significant amount of pain in her left lower extremity did not go to her wound care appointment last week because of the amount of pain she was having but did go this week and they dixie cultures off of the wound and it grew something that prompted them to call her and tell her to come into the emergency department for further evaluation.  Patient denies any fevers.  States that she is chronically cold but denies any rigors.  No nausea vomiting.  Walks with a walker has not had any changes in ambulation.  States that she otherwise feels well and would not of come into the department if she was not told that her cultures were positive.  No other associated signs or symptoms report at this time.    Physical Exam   Triage vitals:  T 36.4 °C (97.5 °F)  HR (!) 110  /84  RR 18  O2 97 % None (Room air)    Physical Exam  Constitutional:       General: She is not in acute distress.     Appearance: Normal appearance. She is not toxic-appearing.   HENT:      Head: Normocephalic and atraumatic.      Mouth/Throat:      Mouth: Mucous membranes are moist.   Eyes:      General: No scleral icterus.     Conjunctiva/sclera: Conjunctivae normal.   Cardiovascular:      Rate and Rhythm: Normal rate and regular rhythm.      Pulses: Normal pulses.   Pulmonary:      Effort: Pulmonary effort is normal.      Breath  sounds: Normal breath sounds.   Abdominal:      General: There is no distension.      Palpations: Abdomen is soft.      Tenderness: There is no abdominal tenderness.   Musculoskeletal:      Cervical back: Normal range of motion and neck supple.      Comments: Lymphedema of the bilateral lower extremities, left lower extremity lateral aspect of the leg with a small pressure dressing over it without any significant leakage.  She is wearing a stocking over the area that was dressed by wound care on Monday.  Able to wiggle her toes.  Sensation intact.  Minimal tenderness over the lateral aspect of the lower left extremity.  It is not warm to touch.  Minimal erythema noted.   Skin:     General: Skin is warm and dry.   Neurological:      General: No focal deficit present.      Mental Status: She is alert.   Psychiatric:         Mood and Affect: Mood normal.         Behavior: Behavior normal.         Thought Content: Thought content normal.         Judgment: Judgment normal.          Medical Decision Making & ED Course   Medical Decision Makin y.o. female with the above past medical history presenting to the emergency department today with concern for positive wound cultures from wound care clinic.  Patient x-rays to evaluate for osteomyelitis, blood cultures, CBC, CMP, lactate in addition to starting the patient on vancomycin and Zosyn was done here in the emergency department.  The patient will likely be admitted.  I have low concern for cellulitis, not significantly erythematous and not warm extremity.  However I was unable to fully undressed the wound as she had stockings and wound care placed today and she preferred we work around the dressing.  I was able to see through the stocking without significant drainage appeared clean without any redness or warmth.  No crepitus noted.  Please see ED course for further lab interpretation and MDM.  ----    Differential diagnoses considered include but are not limited to:  Osteomyelitis, positive wound cultures, chronic lymphedema, cellulitis     Social Determinants of Health which Significantly Impact Care: None identified     EKG Independent Interpretation: See ED Course    Independent Result Review and Interpretation: See ED course    Chronic conditions affecting the patient's care: See HPI    The patient was discussed with the following consultants/services: Admission Coordinator who accepted the patient for admission    Care Considerations: See [facility]    ED Course:  ED Course as of 11/16/24 0354   Sat Nov 16, 2024   0304 Attending summary:  58 y/o F with PMHx bilateral lymphedema who gets wound care weekly who is presenting for abnormal wound culture. She reports worsening pain earlier this week when saw wound so they did a culture. On chart review, pt had pseudomonas and corynebacterium that was MDR. No fevers, chills, n/v. Reports pain has not worsened today, no other new skin changes. No trauma.     Vitals initially show tachycardia to 110s, otherwise unremarkable. Exam shows nontoxic appearing female who is sleeping comfortably. On exam of her leg, sensation to light touch intact, theres no fluctuance, crepitus or severe tenderness. It is grossly erythematous with multiple areas of wound w/ foul-smelling drainage, no bullae or violaceous discoloration. Exam not concerning for deep space abscess or nec fasc. Furthermore, no systemic infectious symptoms which is reassuring. Plan for labs, cultures, IV abx and admission for further IV abx and wound care. Pt agreeable to plan.  [SS]   0352 Anion Gap: 13 [KD]   0352 XR tibia fibula left 2 views  Mid fibular diaphyseal shaft periosteal reaction has slightly  increased compared to prior. Additionally, mid tibial periosteal  reaction is new compared to prior. Findings can be seen with chronic  osteomyelitis although an acute on chronic component is difficult to  evaluate. Evaluation for soft tissue gas is limited given the amount  of  overlying soft tissue. If clinically relevant, MRI can be helpful  for further characterization of osteomyelitis.      No acute fracture. [KD]   0353 She has an elevated CRP and ESR which can be seen with an infectious etiology.  No significant leukocytosis.  CMP otherwise reassuring.  Lactate of 1.2 was requesting her home dose of gabapentin which was given to her in addition to a dose of Toradol for her pain.  Patient was admitted in stable condition.  Patient is agreeable with plan. [KD]      ED Course User Index  [KD] Nita Piper DO  [SS] Hillary Richardson MD         Diagnoses as of 11/16/24 1617   Wound infection     Disposition   As a result of their workup, the patient will require admission to the hospital.  The patient was informed of her diagnosis.  The patient was given the opportunity to ask questions and I answered them. The patient agreed to be admitted to the hospital.    Seen and discussed with ED Attending  Nita Piper DO, PGY-2  Emergency Medicine     Nita Piper DO  Resident  11/16/24 035

## 2024-11-16 NOTE — CARE PLAN
The patient's goals for the shift include  get something warm to drink and get settled in    The clinical goals for the shift include  take photos of wounds and redress    Over the shift, the patient did not make progress toward the following goals. Barriers to progression include pt being a new admission. Recommendations to address these barriers include taking time to get her admitted and settled in.    Problem: Pain - Adult  Goal: Verbalizes/displays adequate comfort level or baseline comfort level  Outcome: Not Progressing     Problem: Discharge Planning  Goal: Discharge to home or other facility with appropriate resources  Outcome: Not Progressing     Problem: Chronic Conditions and Co-morbidities  Goal: Patient's chronic conditions and co-morbidity symptoms are monitored and maintained or improved  Outcome: Not Progressing

## 2024-11-16 NOTE — ED TRIAGE NOTES
Enters ED per primary care recommendations for +wound cultures from leg. Baseline medical history of desmoid tumor, chronic lymphedema (left > right) , recurrent cellulitis, hx of necrotizing fascitis (2020), hx of MRSA infection (~201 at [facility]), hx of recurrent C diff infection (x3, latest 2020), morbid obesity, and hx of migraine.

## 2024-11-16 NOTE — ASSESSMENT & PLAN NOTE
Patricia Mckeon is a 59 y.o. female with a past medical history of desmoid tumor s/p resection, chronic lymphedema, Hx of recurrent cellulitis, necrotizing fasciitis in 2020, anxiety, and morbid obesity who presented to the ED with chief concerns for positive wound cultures based on samples obtained as an outpatient as well as excruciating pain in her left leg. Wound cultures from 11/11 grew Pseudomonas and Corynebacterium striatum. The patient notes that she struggles with cellulitis and chronic lymphedema quite frequently and that she follows up with vascular medicine and wound care clinic; however, due to her pain, she was unable to attend her appointment last week but did go this week, where wound cultures were obtained. She denies any recent fevers, night sweats, weight loss; she does not endorse any chest pain, palpitations, recent sick contacts or cough. She received a call about her cultures being positive and was told to present to the ED. Labs without leukocytosis, and patient remains afebrile with no signs of systemic infection or sepsis. On x ray, Mid fibular diaphyseal shaft periosteal reaction has slightly increased compared to prior. Additionally, mid tibial periosteal reaction is new compared to prior. Findings can be seen with chronic osteomyelitis although an acute on chronic component is difficult to  evaluate. These findings could allude to underlying osteomyelitis in the context of chronic lymphedema vs cellulitis vs chronic venous stasis given no leukocytosis. MRI ordered for further evaluation, awaiting blood cultures, and treating with vanc/zosyn in the meanwhile. Notably, OM is less likely given absence of leukocytosis and systemic signs. A chronic process is favored more. Nevertheless, an MRI will further elucidate true etiology. PT/OT ordered, day team to follow up on blood cultures, adjust antibiotics and consult surgery and podiatry, Given no neurovascular compromise or other red flags, no  c/f compartment syndrome at present. Will ensure adequate pain relief. Recommend day team to seek ID input regarding optimal antibiotic treatment given positive wound cultures as above.

## 2024-11-16 NOTE — H&P
History Of Present Illness  Patricia Mckeon is a 59 y.o. female with a past medical history of desmoid tumor s/p resection, chronic lymphedema, Hx of recurrent cellulitis, necrotizing fasciitis in 2020, anxiety, and morbid obesity who presented to the ED with chief concerns for positive wound cultures based on samples obtained as an outpatient as well as excruciating pain in her left leg. The patient notes that she struggles with cellulitis and chronic lymphedema quite frequently and that she follows up with vascular medicine and wound care clinic; however, due to her pain, she was unable to attend her appointment last week but did go this week, where wound cultures were obtained. She denies any recent fevers, night sweats, weight loss; she does not endorse any chest pain, palpitations, recent sick contacts or cough. She received a call about her cultures being positive and was told to present to the ED.     ED Vitals  T 36.4 °C (97.5 °F)  HR (!) 110  /84  RR 18  O2 97 %      Labs:   See below    Imaging  XR Tibia and fibula   Mid fibular diaphyseal shaft periosteal reaction is noted, slightly  increased from prior radiographs 07/04/2022. There is limited  evaluation subcutaneous or soft tissue gas within the region of the  suspected periosteal reaction given the amount of overlying soft  tissue.    ED Interventions  Vanc/zosyn  Ketorolac     Past Medical History  She has a past medical history of Abdominal aortic aneurysm, without rupture, unspecified (CMS-HCC) and Cellulitis of left lower limb.    Surgical History  She has a past surgical history that includes Other surgical history (11/08/2019); Other surgical history (11/08/2019); Other surgical history (11/08/2019); Other surgical history (04/14/2020); Other surgical history (04/14/2020); and MR angio head wo IV contrast (1/24/2020).     Social History  She reports that she has never smoked. She has never used smokeless tobacco. She reports that she  does not currently use alcohol. She reports that she does not use drugs.    Family History  No family history on file.     Allergies  Latex and Sulfa (sulfonamide antibiotics)    Review of Systems   Musculoskeletal:  Positive for arthralgias.   Skin:  Positive for wound.   All other systems reviewed and are negative.       Physical Exam  Constitutional:       Appearance: Normal appearance. She is obese.   HENT:      Head: Normocephalic and atraumatic.      Nose: Nose normal.      Mouth/Throat:      Mouth: Mucous membranes are moist.   Eyes:      Extraocular Movements: Extraocular movements intact.      Conjunctiva/sclera: Conjunctivae normal.      Pupils: Pupils are equal, round, and reactive to light.   Cardiovascular:      Rate and Rhythm: Normal rate and regular rhythm.   Pulmonary:      Effort: Pulmonary effort is normal.      Breath sounds: Normal breath sounds.   Abdominal:      General: Abdomen is flat. Bowel sounds are normal.      Palpations: Abdomen is soft.   Musculoskeletal:         General: Swelling and tenderness present.      Cervical back: Normal range of motion and neck supple.      Comments: Stigmata of chronic lymphedema of bilateral lower extremities noted; left lower extremity noted to be dressed on the lateral side; however, no warmth to touch noted, drainage and seeping through dressing noted in the left lower extremity.   Skin:     Capillary Refill: Capillary refill takes 2 to 3 seconds.   Neurological:      General: No focal deficit present.      Mental Status: She is alert and oriented to person, place, and time. Mental status is at baseline.          Last Recorded Vitals  /84   Pulse (!) 110   Temp 36.4 °C (97.5 °F)   Resp 18   Wt (!) 181 kg (400 lb)   SpO2 97%     Relevant Results  Results for orders placed or performed during the hospital encounter of 11/16/24 (from the past 24 hours)   Blood Gas Venous Full Panel Unsolicited   Result Value Ref Range    POCT pH, Venous 7.35  7.33 - 7.43 pH    POCT pCO2, Venous 52 (H) 41 - 51 mm Hg    POCT pO2, Venous 26 (L) 35 - 45 mm Hg    POCT SO2, Venous 41 (L) 45 - 75 %    POCT Oxy Hemoglobin, Venous 40.1 (L) 45.0 - 75.0 %    POCT Hematocrit Calculated, Venous 37.0 36.0 - 46.0 %    POCT Sodium, Venous 139 136 - 145 mmol/L    POCT Potassium, Venous 4.5 3.5 - 5.3 mmol/L    POCT Chloride, Venous 105 98 - 107 mmol/L    POCT Ionized Calicum, Venous 1.20 1.10 - 1.33 mmol/L    POCT Glucose, Venous 126 (H) 74 - 99 mg/dL    POCT Lactate, Venous 1.2 0.4 - 2.0 mmol/L    POCT Base Excess, Venous 2.2 -2.0 - 3.0 mmol/L    POCT HCO3 Calculated, Venous 28.7 (H) 22.0 - 26.0 mmol/L    POCT Hemoglobin, Venous 12.4 12.0 - 16.0 g/dL    POCT Anion Gap, Venous 10.0 10.0 - 25.0 mmol/L    Patient Temperature 37.0 degrees Celsius   CBC and Auto Differential   Result Value Ref Range    WBC 8.3 4.4 - 11.3 x10*3/uL    nRBC 0.0 0.0 - 0.0 /100 WBCs    RBC 4.44 4.00 - 5.20 x10*6/uL    Hemoglobin 11.6 (L) 12.0 - 16.0 g/dL    Hematocrit 38.0 36.0 - 46.0 %    MCV 86 80 - 100 fL    MCH 26.1 26.0 - 34.0 pg    MCHC 30.5 (L) 32.0 - 36.0 g/dL    RDW 17.1 (H) 11.5 - 14.5 %    Platelets 334 150 - 450 x10*3/uL    Neutrophils % 65.1 40.0 - 80.0 %    Immature Granulocytes %, Automated 0.4 0.0 - 0.9 %    Lymphocytes % 24.7 13.0 - 44.0 %    Monocytes % 7.2 2.0 - 10.0 %    Eosinophils % 2.2 0.0 - 6.0 %    Basophils % 0.4 0.0 - 2.0 %    Neutrophils Absolute 5.44 1.20 - 7.70 x10*3/uL    Immature Granulocytes Absolute, Automated 0.03 0.00 - 0.70 x10*3/uL    Lymphocytes Absolute 2.06 1.20 - 4.80 x10*3/uL    Monocytes Absolute 0.60 0.10 - 1.00 x10*3/uL    Eosinophils Absolute 0.18 0.00 - 0.70 x10*3/uL    Basophils Absolute 0.03 0.00 - 0.10 x10*3/uL   Magnesium   Result Value Ref Range    Magnesium 2.14 1.60 - 2.40 mg/dL   Comprehensive metabolic panel   Result Value Ref Range    Glucose 107 (H) 74 - 99 mg/dL    Sodium 140 136 - 145 mmol/L    Potassium 4.2 3.5 - 5.3 mmol/L    Chloride 104 98 - 107  mmol/L    Bicarbonate 27 21 - 32 mmol/L    Anion Gap 13 10 - 20 mmol/L    Urea Nitrogen 21 6 - 23 mg/dL    Creatinine 0.92 0.50 - 1.05 mg/dL    eGFR 73 >60 mL/min/1.73m*2    Calcium 9.2 8.6 - 10.6 mg/dL    Albumin 4.1 3.4 - 5.0 g/dL    Alkaline Phosphatase 59 33 - 110 U/L    Total Protein 8.4 (H) 6.4 - 8.2 g/dL    AST 11 9 - 39 U/L    Bilirubin, Total 0.4 0.0 - 1.2 mg/dL    ALT 8 7 - 45 U/L   Blood Culture    Specimen: Peripheral Venipuncture; Blood culture   Result Value Ref Range    Blood Culture Loaded on Instrument - Culture in progress    Blood Culture    Specimen: Peripheral Venipuncture; Blood culture   Result Value Ref Range    Blood Culture Loaded on Instrument - Culture in progress    Sedimentation Rate   Result Value Ref Range    Sedimentation Rate 101 (H) 0 - 30 mm/h   C-reactive protein   Result Value Ref Range    C-Reactive Protein 2.61 (H) <1.00 mg/dL            Assessment/Plan   Assessment & Plan  Wound infection  Patricia Mckeon is a 59 y.o. female with a past medical history of desmoid tumor s/p resection, chronic lymphedema, Hx of recurrent cellulitis, necrotizing fasciitis in 2020, anxiety, and morbid obesity who presented to the ED with chief concerns for positive wound cultures based on samples obtained as an outpatient as well as excruciating pain in her left leg. Wound cultures from 11/11 grew Pseudomonas and Corynebacterium striatum. The patient notes that she struggles with cellulitis and chronic lymphedema quite frequently and that she follows up with vascular medicine and wound care clinic; however, due to her pain, she was unable to attend her appointment last week but did go this week, where wound cultures were obtained. She denies any recent fevers, night sweats, weight loss; she does not endorse any chest pain, palpitations, recent sick contacts or cough. She received a call about her cultures being positive and was told to present to the ED. Labs without leukocytosis, and patient  remains afebrile with no signs of systemic infection or sepsis. On x ray, Mid fibular diaphyseal shaft periosteal reaction has slightly increased compared to prior. Additionally, mid tibial periosteal reaction is new compared to prior. Findings can be seen with chronic osteomyelitis although an acute on chronic component is difficult to  evaluate. These findings could allude to underlying osteomyelitis in the context of chronic lymphedema vs cellulitis vs chronic venous stasis given no leukocytosis. MRI ordered for further evaluation, awaiting blood cultures, and treating with vanc/zosyn in the meanwhile. Notably, OM is less likely given absence of leukocytosis and systemic signs. A chronic process is favored more. Nevertheless, an MRI will further elucidate true etiology. PT/OT ordered, day team to follow up on blood cultures, adjust antibiotics and consult surgery and podiatry, Given no neurovascular compromise or other red flags, no c/f compartment syndrome at present. Will ensure adequate pain relief. Recommend day team to seek ID input regarding optimal antibiotic treatment given positive wound cultures as above.    Note not final until cosigned/attested by attending physician.     Shar Crow MD  PGY2 Internal Medicine

## 2024-11-16 NOTE — PROGRESS NOTES
11/16/24 1106   Discharge Planning   Living Arrangements Children   Support Systems Children   Assistance Needed assist for wound care and gettting shoes on/off   Type of Residence Private residence   Number of Stairs to Enter Residence 2   Number of Stairs Within Residence 0   Home or Post Acute Services None   Expected Discharge Disposition Home H   Does the patient need discharge transport arranged? No   RoundTrip coordination needed? No   Financial Resource Strain   How hard is it for you to pay for the very basics like food, housing, medical care, and heating? Not hard   Housing Stability   In the last 12 months, was there a time when you were not able to pay the mortgage or rent on time? N   At any time in the past 12 months, were you homeless or living in a shelter (including now)? N   Transportation Needs   In the past 12 months, has lack of transportation kept you from medical appointments or from getting medications? no   In the past 12 months, has lack of transportation kept you from meetings, work, or from getting things needed for daily living? No     Transitional Care Coordination Progress Note:  Patient discussed during interdisciplinary rounds.   Team members present: MD, OLEKSANDR  Plan per Medical/Surgical team: wound infection  Payor: [employer]  Discharge disposition: PT/OT pending  Potential Barriers: none  ADOD: 2 days    Previous Home Care: NA  DME: walker, BSC, transfer bench  Pharmacy: [facility] [address]   Falls: Denies  PCP:  Ramesh Rothman; last visit 2 months ago  Met with patient at bedside, provided introduction of self and role. Patient states she lives at home, son is there majority of time. Patient states she is mostly independent for adls, however needs assist with getting shoes on/off and wrapping wound to lower extremity. Patient states either her friend provides transport to appointments or she uses insurance transportation. Patient states no concerns obtaining/affording  medications; states no social/financial concerns. Patient states she is working with waiver services to get HHA and walk in shower approval. Patient states either family or friend will provide transport home at time of discharge. Will continue to monitor for discharge planning needs.     Nelly HASTINGS, RN  Transitional Care Coordinator (TCC)  [phone]

## 2024-11-16 NOTE — CONSULTS
PODIATRIC MEDICINE & SURGERY - CONSULT NOTE    HPI    Patient is a 59 y.o. female with PMHx of desmoid tumor s/p resection, chronic lymphedema, Hx of recurrent cellulitis, necrotizing fasciitis in 2020, anxiety, and morbid obesity.    Podiatry consulted for left leg infection. Patient follows at [facility] with JENISE Ray. A culture from the left leg wound was obtained on Monday and returned positive for Pseudomonas and Corynebacterium. Patient was instructed to come to hospital for management of this infection. Patient reports significant pain of the LLE. Denies F/C/N/V/SOB. She does her own dressing changes using Urgoclean and Xtrasorb Medium daily at home, and has weekly follow ups at the wound center on Mondays.She uses lymphedema pumps, however, was having difficulty tolerating them in the last 1-2 weeks due to increased pain, supposedly related to the current infection. She does ambulate with a walker.       ROS: A 10 point review of systems was performed. Negative except as above.    Medications and Allergies reviewed.        PHYSICAL EXAM    Vitals:    11/16/24 0517   BP: (!) 169/92   Pulse: 92   Resp: 12   Temp: 36.7 °C (98.1 °F)   SpO2: 95%       General: Alert. NAD. Pleasant. Cooperative. Frequent stuttering that she attributes to her anxiety.    Vasc:   Left DP/PT nonpalpable 2/2 significant edema and pain level. Right DP/PT faint. CFT brisk to all digits b/l. Skin temp of BLE is warm to warm from proximal to distal, with the LLE being slightly warmer. Severe edema of BLE, L>R.    Neuro:   Light touch intact to all digits b/l. Left leg tenderness with light palpation.    MSK:   Moving all extremities spontaneously. Ankle ROM b/l intact to mobility status.    Derm:   Wound within the skin fold along left fabi-lateral leg. Greenish drainage present. Tender to light touch. LLE with diffuse erythema and mild localized calor when compared to RLE.     Diffuse BLE skin changes consistent with  chronic lymphedema Nails 1-5 b/l intact. All webspaces clean, dry, intact.     Images in Media      RESULTS    CBC  Lab Results   Component Value Date    WBC 6.8 11/16/2024    HGB 10.4 (L) 11/16/2024    HCT 33.1 (L) 11/16/2024    MCV 85 11/16/2024     11/16/2024       ESR  Lab Results   Component Value Date    SEDRATE 101 (H) 11/16/2024       CRP  Lab Results   Component Value Date    CRP 2.61 (H) 11/16/2024       HgbA1c  Lab Results   Component Value Date    HGBA1C 5.6 07/04/2022       Cultures  Susceptibility data from last 90 days.  Collected Specimen Info Organism Aztreonam Cefepime Ceftazidime Ceftriaxone Ciprofloxacin Gentamicin Levofloxacin Penicillin Piperacillin/Tazobactam Tetracycline Tobramycin Vancomycin   11/11/24 Tissue/Biopsy from Wound/Tissue Pseudomonas aeruginosa  S  S  S   S   S   S   S      Corynebacterium striatum group     R  R  I   R   R   S   09/09/24 Tissue/Biopsy from Wound/Tissue Pseudomonas aeruginosa  S  S  S   S   S   S   S      Mixed Gram-Positive and Gram-Negative Bacteria                   11/16/2024  Blood culture x 2  pending          XRAY LEFT TIB FIB 11/16/2024    IMPRESSION:  Very limited exam due to marked edema and significant soft tissue  overlap. Within these limitations:      Mid fibular diaphyseal shaft periosteal reaction has slightly  increased compared to prior. Additionally, mid tibial periosteal  reaction is new compared to prior. Findings can be seen with chronic  osteomyelitis although an acute on chronic component is difficult to  evaluate. Evaluation for soft tissue gas is limited given the amount  of overlying soft tissue. If clinically relevant, MRI can be helpful  for further characterization of osteomyelitis.      No acute fracture.      ASSESSMENT AND PLAN  Cellulitis of LLE  Chronic lymphedema  Morbid obesity (BMI 70)    - Charts and results reviewed. Low-moderate concern for OM, although none seen on XR. Not concerned for gas or necrotizing infection  at this time given stable vitals and lab workup.  - Wound culture was obtained at wound center. (+) for Pseudomonas pan-sensitive, Corynebacterium sensitive to Vanc. Currently on IV Vanc/Zosyn.  - Wound nurse consulted. Recommend saline rinse of infected area and patting dry, Interdry silver for the leg folds, and Aquacel Ag for other areas. Also ACE wrap with mild compression if patient can tolerate.  - MRI tib/fib w/ and w/o contrast order in place.   - Podiatry will follow. No plan for podiatric surgery at this time. Findings discussed. Questions answered.     This patient was evaluated with the attending, Dr. Aura William DPM.  Carol Tai DPM PGY2  Podiatric Medicine & Surgery

## 2024-11-16 NOTE — CARE PLAN
The patient's goals for the shift include  IV antibiotics and pain control     The clinical goals for the shift include Pt will remain safe and injury free during this shift    Over the shift, the patient did make progress toward the following goals. Barriers to progression include none.  Patient alert and oriented. Pain medication ordered PRN.

## 2024-11-17 VITALS
HEART RATE: 78 BPM | SYSTOLIC BLOOD PRESSURE: 157 MMHG | WEIGHT: 293 LBS | HEIGHT: 63 IN | OXYGEN SATURATION: 96 % | TEMPERATURE: 97.2 F | RESPIRATION RATE: 17 BRPM | BODY MASS INDEX: 51.91 KG/M2 | DIASTOLIC BLOOD PRESSURE: 70 MMHG

## 2024-11-17 LAB
BACTERIA BLD CULT: NORMAL
BACTERIA BLD CULT: NORMAL
BACTERIA SPEC CULT: ABNORMAL
BACTERIA SPEC CULT: ABNORMAL
GRAM STN SPEC: ABNORMAL
GRAM STN SPEC: ABNORMAL

## 2024-11-17 PROCEDURE — 2500000004 HC RX 250 GENERAL PHARMACY W/ HCPCS (ALT 636 FOR OP/ED): Performed by: STUDENT IN AN ORGANIZED HEALTH CARE EDUCATION/TRAINING PROGRAM

## 2024-11-17 PROCEDURE — 2500000001 HC RX 250 WO HCPCS SELF ADMINISTERED DRUGS (ALT 637 FOR MEDICARE OP): Performed by: STUDENT IN AN ORGANIZED HEALTH CARE EDUCATION/TRAINING PROGRAM

## 2024-11-17 PROCEDURE — 99233 SBSQ HOSP IP/OBS HIGH 50: CPT | Performed by: STUDENT IN AN ORGANIZED HEALTH CARE EDUCATION/TRAINING PROGRAM

## 2024-11-17 PROCEDURE — 2500000004 HC RX 250 GENERAL PHARMACY W/ HCPCS (ALT 636 FOR OP/ED)

## 2024-11-17 PROCEDURE — 2500000005 HC RX 250 GENERAL PHARMACY W/O HCPCS: Performed by: STUDENT IN AN ORGANIZED HEALTH CARE EDUCATION/TRAINING PROGRAM

## 2024-11-17 PROCEDURE — 1100000001 HC PRIVATE ROOM DAILY

## 2024-11-17 PROCEDURE — 2500000001 HC RX 250 WO HCPCS SELF ADMINISTERED DRUGS (ALT 637 FOR MEDICARE OP)

## 2024-11-17 RX ORDER — VANCOMYCIN HYDROCHLORIDE 1 G/20ML
INJECTION, POWDER, LYOPHILIZED, FOR SOLUTION INTRAVENOUS DAILY PRN
Status: DISCONTINUED | OUTPATIENT
Start: 2024-11-17 | End: 2024-11-18

## 2024-11-17 RX ADMIN — ACETAMINOPHEN 650 MG: 325 TABLET ORAL at 06:10

## 2024-11-17 RX ADMIN — ACETAMINOPHEN 650 MG: 325 TABLET ORAL at 12:18

## 2024-11-17 RX ADMIN — HYDROXYZINE HYDROCHLORIDE 25 MG: 10 TABLET ORAL at 20:08

## 2024-11-17 RX ADMIN — PIPERACILLIN SODIUM AND TAZOBACTAM SODIUM 3.38 G: 3; .375 INJECTION, SOLUTION INTRAVENOUS at 09:37

## 2024-11-17 RX ADMIN — VANCOMYCIN HYDROCHLORIDE 1750 MG: 5 INJECTION, POWDER, LYOPHILIZED, FOR SOLUTION INTRAVENOUS at 16:47

## 2024-11-17 RX ADMIN — OXYCODONE HYDROCHLORIDE 5 MG: 5 TABLET ORAL at 23:37

## 2024-11-17 RX ADMIN — Medication 2000 MG: at 06:13

## 2024-11-17 RX ADMIN — ENOXAPARIN SODIUM 60 MG: 60 INJECTION SUBCUTANEOUS at 09:37

## 2024-11-17 RX ADMIN — BUSPIRONE HYDROCHLORIDE 5 MG: 5 TABLET ORAL at 20:08

## 2024-11-17 RX ADMIN — GABAPENTIN 300 MG: 300 CAPSULE ORAL at 09:37

## 2024-11-17 RX ADMIN — PIPERACILLIN SODIUM AND TAZOBACTAM SODIUM 3.38 G: 3; .375 INJECTION, SOLUTION INTRAVENOUS at 16:04

## 2024-11-17 RX ADMIN — ACETAMINOPHEN 650 MG: 325 TABLET ORAL at 23:37

## 2024-11-17 RX ADMIN — PIPERACILLIN SODIUM AND TAZOBACTAM SODIUM 3.38 G: 3; .375 INJECTION, SOLUTION INTRAVENOUS at 00:26

## 2024-11-17 RX ADMIN — PANTOPRAZOLE SODIUM 40 MG: 40 TABLET, DELAYED RELEASE ORAL at 06:11

## 2024-11-17 RX ADMIN — OXYCODONE HYDROCHLORIDE 5 MG: 5 TABLET ORAL at 12:18

## 2024-11-17 RX ADMIN — OXYCODONE HYDROCHLORIDE 5 MG: 5 TABLET ORAL at 18:08

## 2024-11-17 RX ADMIN — FUROSEMIDE 40 MG: 40 TABLET ORAL at 09:37

## 2024-11-17 RX ADMIN — ENOXAPARIN SODIUM 60 MG: 60 INJECTION SUBCUTANEOUS at 20:07

## 2024-11-17 RX ADMIN — OXYCODONE HYDROCHLORIDE 5 MG: 5 TABLET ORAL at 06:10

## 2024-11-17 RX ADMIN — ACETAMINOPHEN 650 MG: 325 TABLET ORAL at 18:08

## 2024-11-17 RX ADMIN — ACETAMINOPHEN 650 MG: 325 TABLET ORAL at 00:34

## 2024-11-17 RX ADMIN — BUSPIRONE HYDROCHLORIDE 5 MG: 5 TABLET ORAL at 09:37

## 2024-11-17 ASSESSMENT — PAIN SCALES - GENERAL
PAINLEVEL_OUTOF10: 6
PAINLEVEL_OUTOF10: 8
PAINLEVEL_OUTOF10: 3
PAINLEVEL_OUTOF10: 6
PAINLEVEL_OUTOF10: 6
PAINLEVEL_OUTOF10: 4
PAINLEVEL_OUTOF10: 7

## 2024-11-17 ASSESSMENT — COGNITIVE AND FUNCTIONAL STATUS - GENERAL
MOBILITY SCORE: 19
MOVING TO AND FROM BED TO CHAIR: A LITTLE
WALKING IN HOSPITAL ROOM: A LITTLE
HELP NEEDED FOR BATHING: A LITTLE
DAILY ACTIVITIY SCORE: 23
TURNING FROM BACK TO SIDE WHILE IN FLAT BAD: A LITTLE
TURNING FROM BACK TO SIDE WHILE IN FLAT BAD: A LITTLE
CLIMB 3 TO 5 STEPS WITH RAILING: A LOT
WALKING IN HOSPITAL ROOM: A LITTLE
DAILY ACTIVITIY SCORE: 23
MOBILITY SCORE: 19
MOVING TO AND FROM BED TO CHAIR: A LITTLE
HELP NEEDED FOR BATHING: A LITTLE
CLIMB 3 TO 5 STEPS WITH RAILING: A LOT

## 2024-11-17 ASSESSMENT — PAIN - FUNCTIONAL ASSESSMENT
PAIN_FUNCTIONAL_ASSESSMENT: 0-10

## 2024-11-17 ASSESSMENT — PAIN DESCRIPTION - LOCATION
LOCATION: GENERALIZED
LOCATION: GENERALIZED
LOCATION: LEG
LOCATION: HEAD

## 2024-11-17 ASSESSMENT — PAIN DESCRIPTION - ORIENTATION
ORIENTATION: MID
ORIENTATION: LEFT

## 2024-11-17 NOTE — CARE PLAN
The patient's goals for the shift include  pain control    The clinical goals for the shift include Pt will remain safe and injury free during this shift

## 2024-11-17 NOTE — PROGRESS NOTES
Patricia Mckeon is a 59 y.o. female on day 1 of admission presenting with Wound infection.      Subjective   Pt was seen  and examined this am, Pt has no acute event overnight. Addressed all of the patient concerns and explained the treatment plan.          Objective     Last Recorded Vitals  /81 (BP Location: Right arm, Patient Position: Lying)   Pulse 85   Temp 36.4 °C (97.5 °F) (Temporal)   Resp 16   Wt (!) 180 kg (396 lb 6.4 oz)   SpO2 97%   Intake/Output last 3 Shifts:    Intake/Output Summary (Last 24 hours) at 11/17/2024 0916  Last data filed at 11/17/2024 0510  Gross per 24 hour   Intake --   Output 1400 ml   Net -1400 ml       Admission Weight  Weight: (!) 181 kg (400 lb) (11/15/24 2316)    Daily Weight  11/16/24 : (!) 180 kg (396 lb 6.4 oz)    Image Results  XR chest 1 view  Narrative: Interpreted By:  Gilmar Guerra,   STUDY:  XR CHEST 1 VIEW;  11/16/2024 3:37 am      INDICATION:  Signs/Symptoms:infectious etiology/PNA r/o.      COMPARISON:  None.      ACCESSION NUMBER(S):  [id number]      ORDERING CLINICIAN:  MARTY ZAVALA      FINDINGS:  There is magnification of the cardiac silhouette secondary to AP  technique.. No focal airspace consolidation or pleural effusion. No  pneumothorax.      Impression: No airspace consolidation or pleural effusion.      MACRO:  None      Signed by: Gilmar Guerra 11/16/2024 3:48 AM  Dictation workstation:   [id number]  XR tibia fibula left 2 views  Narrative: Interpreted By:  Frandy Banks,  and Ankush Gama   STUDY:  XR TIBIA FIBULA LEFT 2 VIEWS; ;  11/16/2024 1:56 am      INDICATION:  Signs/Symptoms:? osteo with positive wound cultures and chronic  lymphedema.      COMPARISON:  Left tibia/fibula radiographs 07/04/2022. CT left lower extremity  05/04/2023.      ACCESSION NUMBER(S):  [id number]      ORDERING CLINICIAN:  MARTY ZAVALA      FINDINGS:  Left tibia/fibula, two views.      There is periosteal reaction seen about the mid  fibular shaft, which  appears slightly increased when compared to prior radiograph  07/04/2022. There is also periosteal reaction about the mid tibial  diaphysis, new compared to prior. Soft tissue defects are present  about lateral aspect of the foot. Evaluation for soft tissue gas is  limited due to marked edema.      No acute fracture. Normal alignment.      Multiple surgical clips overlie the mid to distal tibia/fibula.      Impression: Very limited exam due to marked edema and significant soft tissue  overlap. Within these limitations:      Mid fibular diaphyseal shaft periosteal reaction has slightly  increased compared to prior. Additionally, mid tibial periosteal  reaction is new compared to prior. Findings can be seen with chronic  osteomyelitis although an acute on chronic component is difficult to  evaluate. Evaluation for soft tissue gas is limited given the amount  of overlying soft tissue. If clinically relevant, MRI can be helpful  for further characterization of osteomyelitis.      No acute fracture.      I personally reviewed the images/study and I agree with the findings  as stated by Natan Ansari MD (Radiology Resident).      MACRO:  None      Signed by: Frandy Banks 11/16/2024 3:35 AM  Dictation workstation:   [id number]      Physical Exam    Constitutional:       Appearance: Normal appearance. She is obese.   HENT:      Head: Normocephalic and atraumatic.      Nose: Nose normal.      Mouth/Throat:      Mouth: Mucous membranes are moist.   Eyes:      Extraocular Movements: Extraocular movements intact.      Conjunctiva/sclera: Conjunctivae normal.      Pupils: Pupils are equal, round, and reactive to light.   Cardiovascular:      Rate and Rhythm: Normal rate and regular rhythm.   Pulmonary:      Effort: Pulmonary effort is normal.      Breath sounds: Normal breath sounds.   Abdominal:      General: Abdomen is flat. Bowel sounds are normal.      Palpations: Abdomen is soft.    Musculoskeletal:         General: Swelling and tenderness present.      Cervical back: Normal range of motion and neck supple.      Comments: Stigmata of chronic lymphedema of bilateral lower extremities noted; left lower extremity noted to be dressed on the lateral side; however, no warmth to touch noted, drainage and seeping through dressing noted in the left lower extremity.   Skin:     Capillary Refill: Capillary refill takes 2 to 3 seconds.   Neurological:      General: No focal deficit present.      Mental Status: She is alert and oriented to person, place, and time. Mental status is at baseline.   Relevant Results               Assessment/Plan                  Assessment & Plan  Wound infection            59-year-old F PMH of desmoid tumor s/p resection, chronic lymphedema, recurrent cellulitis, necrotizing fasciitis , anxiety/depression, and morbid obesity presenting for nonhealing ulcers of the left lower extremity with concern for superimposed skin soft tissue infection.  Elevated inflammatory markers, wound cultures from recent wound care visit growing Pseudomonas aeruginosa and corynebacterium striatum.  Started on broad-spectrum antibiotics in the ED.  Tib-fib x-ray of the left lower extremity showing concern for osteomyelitis. MRI ordered. Podiatry consulted.        #Recurrent Skin soft tissue infection left lower extremity   #Possible osteomyelitis   -X-ray showing periosteal changes concerning for OM, MRI is pending   -Recent cultures on 11/11 growing Pseudomonas aeruginosa and corynebacterium striatum  -Started on empiric vancomycin and Zosyn, follow-up sensitivities and narrow antibiotics as indicated  -Wound care.       #Anxiety/depression  -Continue home buspirone 5 mg twice daily and hydroxyzine 25 mg as needed     #Bilateral lower extremity edema  #Chronic lymphedema  -Continue home Lasix 40 mg daily     F: None  E: Replete as needed  N: Regular  GI: PPI  DVT: Lovenox  CODE STATUS: Full  code           Cellulitis of LLE  Chronic lymphedema  Morbid obesity (BMI 70)                          Earline Franklin MD

## 2024-11-17 NOTE — CARE PLAN
The patient's goals for the shift include      The clinical goals for the shift include Pt will remain safe and injury free during this shift    Over the shift, the patient did not make progress toward the following goals. Barriers to progression include patient will call for assistant OOB. Recommendations to address these barriers include Q2H purposeful rounding.

## 2024-11-17 NOTE — CONSULTS
Occupational Therapy Evaluation(time=1030-1100)     Patient Name: Kristen Davis  Krystal Hutchison MD;  Location: [address] [place] [address] Vancomycin Dosing by Pharmacy- INITIAL    Patricia Mckeon is a 59 y.o. year old female who Pharmacy has been consulted for vancomycin dosing for cellulitis, skin and soft tissue. Based on the patient's indication and renal status this patient will be dosed based on a goal AUC of 400-600.     Renal function is currently stable.    Visit Vitals  /81 (BP Location: Right arm, Patient Position: Lying)   Pulse 85   Temp 36.4 °C (97.5 °F) (Temporal)   Resp 16        Lab Results   Component Value Date    CREATININE 0.88 2024    CREATININE 0.92 2024    CREATININE 0.92 2024    CREATININE 0.83 05/15/2023    CREATININE CANCELED 05/10/2023    CREATININE 0.88 2023    CREATININE 0.84 2023        Patient weight is as follows:   Vitals:    24 0514   Weight: (!) 180 kg (396 lb 6.4 oz)       Cultures:  No results found for the encounter in last 14 days.        I/O last 3 completed shifts:  In: 550 (3.1 mL/kg) [IV Piggyback:550]  Out: 1400 (7.8 mL/kg) [Urine:1400 (0.2 mL/kg/hr)]  Weight: 179.8 kg   I/O during current shift:  No intake/output data recorded.    Temp (24hrs), Av.6 °C (97.8 °F), Min:36.4 °C (97.5 °F), Max:36.7 °C (98.1 °F)         Assessment/Plan     Patient was started on vancomycin yesterday. Will change regimen to 1750 mg Q12hr.     This dosing regimen is predicted by [employer] to result in the following pharmacokinetic parameters:  Loading dose: N/A  Regimen: 1750 mg IV every 12 hours.  Start time: 06:13 on 2024  Exposure target: AUC24 (range)400-600 mg/L.hr   QUZ04-20: 470 mg/L.hr  AUC24,ss: 475 mg/L.hr  Probability of AUC24 > 400: 62 %  Ctrough,ss: 10.3 mg/L  Probability of Ctrough,ss > 20: 25 %  Follow-up level will be ordered on  at am draw, unless clinically indicated sooner.  Will continue to monitor renal function daily while on vancomycin and order serum creatinine at least every 48 hours if not already ordered.  Follow for continued vancomycin needs,  clinical response, and signs/symptoms of toxicity.       Shannon Wheeler, PharmD        Long Term Goal Pt will demonstrate Marilou Brady Leo, OT

## 2024-11-18 ENCOUNTER — APPOINTMENT (OUTPATIENT)
Dept: WOUND CARE | Facility: CLINIC | Age: 59
End: 2024-11-18
Payer: MEDICARE

## 2024-11-18 ENCOUNTER — APPOINTMENT (OUTPATIENT)
Dept: RADIOLOGY | Facility: HOSPITAL | Age: 59
DRG: 603 | End: 2024-11-18
Payer: MEDICARE

## 2024-11-18 LAB
ALBUMIN SERPL BCP-MCNC: 3.2 G/DL (ref 3.4–5)
ANION GAP SERPL CALC-SCNC: 11 MMOL/L (ref 10–20)
BUN SERPL-MCNC: 14 MG/DL (ref 6–23)
CALCIUM SERPL-MCNC: 8 MG/DL (ref 8.6–10.6)
CHLORIDE SERPL-SCNC: 105 MMOL/L (ref 98–107)
CO2 SERPL-SCNC: 25 MMOL/L (ref 21–32)
CREAT SERPL-MCNC: 0.88 MG/DL (ref 0.5–1.05)
EGFRCR SERPLBLD CKD-EPI 2021: 77 ML/MIN/1.73M*2
ERYTHROCYTE [DISTWIDTH] IN BLOOD BY AUTOMATED COUNT: 17.2 % (ref 11.5–14.5)
GLUCOSE SERPL-MCNC: 141 MG/DL (ref 74–99)
HCT VFR BLD AUTO: 33.7 % (ref 36–46)
HGB BLD-MCNC: 9.8 G/DL (ref 12–16)
MCH RBC QN AUTO: 26.5 PG (ref 26–34)
MCHC RBC AUTO-ENTMCNC: 29.1 G/DL (ref 32–36)
MCV RBC AUTO: 91 FL (ref 80–100)
NRBC BLD-RTO: 0 /100 WBCS (ref 0–0)
PHOSPHATE SERPL-MCNC: 3 MG/DL (ref 2.5–4.9)
PLATELET # BLD AUTO: 253 X10*3/UL (ref 150–450)
POTASSIUM SERPL-SCNC: 3.7 MMOL/L (ref 3.5–5.3)
RBC # BLD AUTO: 3.7 X10*6/UL (ref 4–5.2)
SODIUM SERPL-SCNC: 137 MMOL/L (ref 136–145)
VANCOMYCIN SERPL-MCNC: 45 UG/ML (ref 5–20)
WBC # BLD AUTO: 4.7 X10*3/UL (ref 4.4–11.3)

## 2024-11-18 PROCEDURE — 2500000004 HC RX 250 GENERAL PHARMACY W/ HCPCS (ALT 636 FOR OP/ED)

## 2024-11-18 PROCEDURE — 99233 SBSQ HOSP IP/OBS HIGH 50: CPT | Performed by: STUDENT IN AN ORGANIZED HEALTH CARE EDUCATION/TRAINING PROGRAM

## 2024-11-18 PROCEDURE — 2500000001 HC RX 250 WO HCPCS SELF ADMINISTERED DRUGS (ALT 637 FOR MEDICARE OP): Performed by: STUDENT IN AN ORGANIZED HEALTH CARE EDUCATION/TRAINING PROGRAM

## 2024-11-18 PROCEDURE — 36415 COLL VENOUS BLD VENIPUNCTURE: CPT | Performed by: STUDENT IN AN ORGANIZED HEALTH CARE EDUCATION/TRAINING PROGRAM

## 2024-11-18 PROCEDURE — 97165 OT EVAL LOW COMPLEX 30 MIN: CPT | Mod: GO

## 2024-11-18 PROCEDURE — 85027 COMPLETE CBC AUTOMATED: CPT | Performed by: STUDENT IN AN ORGANIZED HEALTH CARE EDUCATION/TRAINING PROGRAM

## 2024-11-18 PROCEDURE — A9575 INJ GADOTERATE MEGLUMI 0.1ML: HCPCS | Performed by: STUDENT IN AN ORGANIZED HEALTH CARE EDUCATION/TRAINING PROGRAM

## 2024-11-18 PROCEDURE — 80202 ASSAY OF VANCOMYCIN: CPT | Performed by: STUDENT IN AN ORGANIZED HEALTH CARE EDUCATION/TRAINING PROGRAM

## 2024-11-18 PROCEDURE — 73720 MRI LWR EXTREMITY W/O&W/DYE: CPT | Mod: LEFT SIDE | Performed by: STUDENT IN AN ORGANIZED HEALTH CARE EDUCATION/TRAINING PROGRAM

## 2024-11-18 PROCEDURE — 99222 1ST HOSP IP/OBS MODERATE 55: CPT | Performed by: INTERNAL MEDICINE

## 2024-11-18 PROCEDURE — 2500000001 HC RX 250 WO HCPCS SELF ADMINISTERED DRUGS (ALT 637 FOR MEDICARE OP)

## 2024-11-18 PROCEDURE — 2500000005 HC RX 250 GENERAL PHARMACY W/O HCPCS: Performed by: STUDENT IN AN ORGANIZED HEALTH CARE EDUCATION/TRAINING PROGRAM

## 2024-11-18 PROCEDURE — 2550000001 HC RX 255 CONTRASTS: Performed by: STUDENT IN AN ORGANIZED HEALTH CARE EDUCATION/TRAINING PROGRAM

## 2024-11-18 PROCEDURE — 73720 MRI LWR EXTREMITY W/O&W/DYE: CPT | Mod: LT

## 2024-11-18 PROCEDURE — 1100000001 HC PRIVATE ROOM DAILY

## 2024-11-18 PROCEDURE — 2500000004 HC RX 250 GENERAL PHARMACY W/ HCPCS (ALT 636 FOR OP/ED): Performed by: STUDENT IN AN ORGANIZED HEALTH CARE EDUCATION/TRAINING PROGRAM

## 2024-11-18 PROCEDURE — 84100 ASSAY OF PHOSPHORUS: CPT | Performed by: STUDENT IN AN ORGANIZED HEALTH CARE EDUCATION/TRAINING PROGRAM

## 2024-11-18 RX ORDER — CEFEPIME HYDROCHLORIDE 2 G/50ML
2 INJECTION, SOLUTION INTRAVENOUS EVERY 8 HOURS
Status: DISCONTINUED | OUTPATIENT
Start: 2024-11-18 | End: 2024-11-19 | Stop reason: HOSPADM

## 2024-11-18 RX ORDER — GADOTERATE MEGLUMINE 376.9 MG/ML
40 INJECTION INTRAVENOUS
Status: COMPLETED | OUTPATIENT
Start: 2024-11-18 | End: 2024-11-18

## 2024-11-18 RX ADMIN — OXYCODONE HYDROCHLORIDE 5 MG: 5 TABLET ORAL at 18:57

## 2024-11-18 RX ADMIN — GABAPENTIN 300 MG: 300 CAPSULE ORAL at 09:30

## 2024-11-18 RX ADMIN — VANCOMYCIN HYDROCHLORIDE 1750 MG: 5 INJECTION, POWDER, LYOPHILIZED, FOR SOLUTION INTRAVENOUS at 04:24

## 2024-11-18 RX ADMIN — ACETAMINOPHEN 650 MG: 325 TABLET ORAL at 22:47

## 2024-11-18 RX ADMIN — BUSPIRONE HYDROCHLORIDE 5 MG: 5 TABLET ORAL at 09:30

## 2024-11-18 RX ADMIN — PANTOPRAZOLE SODIUM 40 MG: 40 TABLET, DELAYED RELEASE ORAL at 06:24

## 2024-11-18 RX ADMIN — GADOTERATE MEGLUMINE 36 ML: 376.9 INJECTION INTRAVENOUS at 21:32

## 2024-11-18 RX ADMIN — PIPERACILLIN SODIUM AND TAZOBACTAM SODIUM 3.38 G: 3; .375 INJECTION, SOLUTION INTRAVENOUS at 00:25

## 2024-11-18 RX ADMIN — ENOXAPARIN SODIUM 60 MG: 60 INJECTION SUBCUTANEOUS at 09:30

## 2024-11-18 RX ADMIN — CEFEPIME HYDROCHLORIDE 2 G: 2 INJECTION, SOLUTION INTRAVENOUS at 22:36

## 2024-11-18 RX ADMIN — BUSPIRONE HYDROCHLORIDE 5 MG: 5 TABLET ORAL at 22:35

## 2024-11-18 RX ADMIN — PIPERACILLIN SODIUM AND TAZOBACTAM SODIUM 3.38 G: 3; .375 INJECTION, SOLUTION INTRAVENOUS at 09:30

## 2024-11-18 RX ADMIN — ENOXAPARIN SODIUM 60 MG: 60 INJECTION SUBCUTANEOUS at 22:35

## 2024-11-18 RX ADMIN — FUROSEMIDE 40 MG: 40 TABLET ORAL at 09:30

## 2024-11-18 RX ADMIN — OXYCODONE HYDROCHLORIDE 5 MG: 5 TABLET ORAL at 09:29

## 2024-11-18 ASSESSMENT — COGNITIVE AND FUNCTIONAL STATUS - GENERAL
HELP NEEDED FOR BATHING: A LITTLE
PERSONAL GROOMING: A LITTLE
TURNING FROM BACK TO SIDE WHILE IN FLAT BAD: A LITTLE
DRESSING REGULAR LOWER BODY CLOTHING: A LITTLE
DRESSING REGULAR UPPER BODY CLOTHING: A LITTLE
MOVING FROM LYING ON BACK TO SITTING ON SIDE OF FLAT BED WITH BEDRAILS: A LITTLE
MOVING FROM LYING ON BACK TO SITTING ON SIDE OF FLAT BED WITH BEDRAILS: A LITTLE
DAILY ACTIVITIY SCORE: 19
PERSONAL GROOMING: A LITTLE
STANDING UP FROM CHAIR USING ARMS: A LITTLE
DRESSING REGULAR UPPER BODY CLOTHING: A LITTLE
DRESSING REGULAR LOWER BODY CLOTHING: A LITTLE
STANDING UP FROM CHAIR USING ARMS: A LITTLE
MOVING TO AND FROM BED TO CHAIR: A LITTLE
MOVING TO AND FROM BED TO CHAIR: A LITTLE
MOBILITY SCORE: 17
DRESSING REGULAR LOWER BODY CLOTHING: A LITTLE
MOBILITY SCORE: 17
DAILY ACTIVITIY SCORE: 21
WALKING IN HOSPITAL ROOM: A LITTLE
TURNING FROM BACK TO SIDE WHILE IN FLAT BAD: A LITTLE
WALKING IN HOSPITAL ROOM: A LITTLE
HELP NEEDED FOR BATHING: A LOT
CLIMB 3 TO 5 STEPS WITH RAILING: A LOT
HELP NEEDED FOR BATHING: A LITTLE
TOILETING: A LITTLE
CLIMB 3 TO 5 STEPS WITH RAILING: A LOT
DAILY ACTIVITIY SCORE: 19
TOILETING: A LITTLE

## 2024-11-18 ASSESSMENT — PAIN SCALES - GENERAL
PAINLEVEL_OUTOF10: 6
PAINLEVEL_OUTOF10: 7
PAINLEVEL_OUTOF10: 0 - NO PAIN
PAINLEVEL_OUTOF10: 7

## 2024-11-18 ASSESSMENT — PAIN - FUNCTIONAL ASSESSMENT
PAIN_FUNCTIONAL_ASSESSMENT: 0-10

## 2024-11-18 ASSESSMENT — ENCOUNTER SYMPTOMS
DIAPHORESIS: 0
SHORTNESS OF BREATH: 0
CHILLS: 0
COUGH: 0
CONFUSION: 0
DIARRHEA: 0
ABDOMINAL PAIN: 0
FEVER: 0

## 2024-11-18 ASSESSMENT — PAIN DESCRIPTION - LOCATION
LOCATION: LEG
LOCATION: KNEE

## 2024-11-18 ASSESSMENT — PAIN DESCRIPTION - ORIENTATION: ORIENTATION: RIGHT;LEFT

## 2024-11-18 ASSESSMENT — PAIN DESCRIPTION - DESCRIPTORS: DESCRIPTORS: ACHING

## 2024-11-18 ASSESSMENT — ACTIVITIES OF DAILY LIVING (ADL): BATHING_ASSISTANCE: MODERATE

## 2024-11-18 NOTE — CONSULTS
Wound Care Consult     Visit Date: 11/18/2024      Patient Name: Patricia Mckeon         MRN: [id number]           YOB: 1965     Reason for Consult: Lymphedema/Chronic wounds         Wound History:  female with PMHx of desmoid tumor s/p resection, chronic lymphedema, Hx of recurrent cellulitis, necrotizing fasciitis in 2020, anxiety, and morbid obesity. Patient follows at [facility] with JENISE Ray.     Wound Assessment:  Wound 01/29/24 Venous Ulcer Leg Left;Anterior;Lateral (Active)   Cindi-Wound Assessment Pink;Red 11/17/24 2024   Drainage Description Clear 11/17/24 2024   Drainage Amount Moderate 11/17/24 2024   Dressing Open to air 11/18/24 0900       Wound 01/29/24 Venous Ulcer Leg Left;Lateral;Lower (Active)   Wound Image   11/18/24 1824   Site Assessment Denuded;Fibrinous;Yellow 11/18/24 1824   Cindi-Wound Assessment Denuded;Edematous;Erythematous 11/18/24 1824   Non-staged Wound Description Full thickness 11/18/24 1822   Wound Length (cm) 8 cm 11/18/24 1822   Wound Width (cm) 5 cm 11/18/24 1822   Wound Surface Area (cm^2) 40 cm^2 11/18/24 1822   Wound Depth (cm) 1.5 cm 11/18/24 1822   Wound Volume (cm^3) 60 cm^3 11/18/24 1822   Wound Healing % -1718 11/18/24 1822   State of Healing Non-healing 11/18/24 1824   Margins Attached edges 11/18/24 1822   Treatments Cleansed;Site care 11/18/24 1824   Drainage Description Purulent;Yellow 11/18/24 1824   Drainage Amount Moderate 11/18/24 1824   Dressing Other (Comment);ABD 11/18/24 1824   Dressing Changed New 11/18/24 1824   Dressing Status Clean;Dry 11/18/24 1824   Dressing Status Clean;Dry 11/18/24 1824       Wound 03/11/24 Moisture Associated Skin Damage Leg Left;Dorsal (Active)   Drainage Description Clear 11/17/24 2024   Drainage Amount Small 11/17/24 2024   Dressing Open to air 11/18/24 0900     Wound location: Anterior/Medial LLE             Undermining: no  Tracking: unable to determine    Wound type: Vascular compromise resulting in  tissue damage c/b ITD (skin folds)  Wound bed: moist adherent yellow/white slough   Draining: purulent cloudy serous fluid  Periwound skin: edematous erythematous tissue with chronic tissue changes    Recommendations: EVERY OTHER DAY  Irrigate with Vashe wound cleanser ([employer] order #[id number])    Pack Hydrofera Blue Ready into skin fold to wound base medial to lateral   Cover with ABD pads- change daily or as needed for drainage control.  Apply Tubigrip size G ([employer] order # [id number])- #2 layers if possible.    Wound location: L lateral LE       Undermining: no  Tracking: no  Wound type: Vascular compromise resulting in tissue damage   Wound bed: moist red tissue with yellow biofilm  Draining: cloudy serous fluid  Periwound skin: edematous/ erythematous/ chronic skin changes    Recommendations: DAILY for Lateral LLE   Irrigate with Vashe wound cleanser ([employer] order #[id number])    Cover wound bed with Medihoney ([employer] order #[id number]), then cover with Aquacel Extra ([employer] order #[id number]). Cover with Mepilex transfer ([employer] order #[id number]) Pad with ABDs and apply Tubigrip size G.    Wound location: proximal anterior LLE     Size: 2 x 2 cm     Undermining: no  Tracking: no  Wound type: traumatic non healing wound   Wound bed: moist adherent slough   Draining: none  Periwound skin: edematous/ erythematous/ chronic skin changes    Recommendations: DAILY For proximal LLE wound  Irrigate with normal saline or wound cleanser cover wound bed with Medihoney and Cover with Mepilex border dressing       Wound Team Plan:  Primary provider please review the wound care consult note and pending wound care order. If you agree with orders please file in EMR. Bedside RN please have  order wound supplies specified above.  Patient admits she is having issues getting to the wound clinic for care due to the fact she is not driving and insurance will not cover transportation. Patient is  struggling to get to [facility] and she is buying wound care supplies through [employer] due to increased cost through medical supply company. Patient may benefit from social work consult or TCC meeting to assist with insurance issues.     While in bed patient should only be on one EHOB air mattress overlay, a fitted sheet, and one EHOB repositioning sheet with appropriate white chux. Please do not use brief while patient is resting in bed. Turn and reposition patient at least every 2 hours.  Elevate heels off the bed surface at all times.      While inpatient, Secure chat with questions or if condition changes. For urgent communications please message the [facility] Wound Care Team through [email or web address] messaging.       Bia Friedman RN, ALVIN  11/18/2024  6:25 PM

## 2024-11-18 NOTE — PROGRESS NOTES
Occupational Therapy    Evaluation    Patient Name: Patricia Mckeon  MRN: [id number]  Department: [facility]  Room: [address] [place] [address]  Today's Date: 11/18/2024  Time Calculation  Start Time: 1227  Stop Time: 1245  Time Calculation (min): 18 min    Assessment  IP OT Assessment  OT Assessment: difficulty I/ADLs, safety,fxnl mob  Prognosis: Good  Barriers to Discharge: None  Evaluation/Treatment Tolerance: Patient tolerated treatment well  Medical Staff Made Aware: Yes  End of Session Communication: Bedside nurse  Plan:  No Skilled OT: No acute OT goals identified  OT Frequency: OT eval only  OT Discharge Recommendations:  (OP OT lymphedema)  Equipment Recommended upon Discharge:  (n/a)  OT Recommended Transfer Status: Assist of 1  OT - OK to Discharge: Yes    Subjective   Current Problem:  1. Wound infection          General:  General  Reason for Referral: Wound infection.  Past Medical History Relevant to Rehab: desmoid tumor s/p resection, chronic lymphedema, recurrent cellulitis, necrotizing fasciitis , anxiety/depression, and morbid obesity presenting for nonhealing ulcers of the left lower extremity with concern for superimposed skin soft tissue infection.  Elevated inflammatory markers, wound cultures from recent wound care visit growing Pseudomonas aeruginosa and corynebacterium striatum.  Started on broad-spectrum antibiotics.  Tib-fib x-ray of the left lower extremity showing concern for osteomyelitis.  Family/Caregiver Present: No  Prior to Session Communication: Bedside nurse, PCT/NA/CTA  Patient Position Received: Bed, 3 rail up, Alarm off, not on at start of session  General Comment: pleasant, cooperative pt has post op shoe she purchased outside of hostital  Precautions:  Medical Precautions: Fall precautions    Vital Sign (Past 2hrs)        Date/Time Vitals Session Patient Position Pulse Resp SpO2 BP MAP (mmHg)    11/18/24 1300 --  --  95  18  98 %  170/81  111                        Pain:  Pain Assessment  Pain  Assessment: 0-10  0-10 (Numeric) Pain Score: 7  Pain Location:  (BLE L>R)    Objective   Cognition:  Overall Cognitive Status: Within Functional Limits  Orientation Level: Oriented X4  Following Commands: Follows all commands and directions without difficulty  Insight: Within function limits           Home Living:  Type of Home: House  Lives With:  (son)  Home Adaptive Equipment:  (WhW, tub bench, cane)  Home Layout:  (2 JALEEL 1st floor setup)  Bathroom Shower/Tub: Tub/shower unit  Bathroom Toilet: Standard   Prior Function:  Level of [place]: Independent with ADLs and functional transfers, Independent with homemaking with ambulation  Vocational: Unemployed  Leisure: pets  Hand Dominance: Right  IADL History:  IADL Comments: A driving and groceries delivered, family A other IADLs as needed  ADL:  Eating Assistance: Independent  Grooming Assistance: Independent  Bathing Assistance: Moderate (anticipated AE)  UE Dressing Assistance: Independent  LE Dressing Assistance: Minimal (anticipated)  Toileting Assistance with Device: Stand by  Activity Tolerance:  Endurance:  (good)  Bed Mobility/Transfers: Bed Mobility  Bed Mobility:  (sup to sit SBA)    Transfers  Transfer:  (sit/stand 2x SBA WHW, toilet transfer SBA)      Functional Mobility:  Functional Mobility  Functional Mobility Performed:  (pt performed fxnl mob to/from bed/bathroom SBA WHW)  Sitting Balance:  Dynamic Sitting Balance  Dynamic Sitting-Level of Assistance: Independent  Standing Balance:  Dynamic Standing Balance  Dynamic Standing-Level of Assistance:  (SBA)       IADL's:   IADL Comments: A driving and groceries delivered, family A other IADLs as needed  Vision: Vision - Basic Assessment  Current Vision: Wears glasses all the time  Sensation:  Light Touch:  (n/t BLE)  Strength:  Strength Comments: BUE WFL       Coordination:  Movements are Fluid and Coordinated: Yes   Hand Function:  Hand Function  Gross Grasp: Functional  Extremities: RUE   RUE :  Within Functional Limits and LUE   LUE: Within Functional Limits      Outcome Measures: [facility] Daily Activity  Putting on and taking off regular lower body clothing: A little  Bathing (including washing, rinsing, drying): A lot  Putting on and taking off regular upper body clothing: None  Toileting, which includes using toilet, bedpan or urinal: None  Taking care of personal grooming such as brushing teeth: None  Eating Meals: None  Daily Activity - Total Score: 21         and OT Adult Other Outcome Measures  4AT: -  Education Documentation  Body Mechanics, taught by Laura Mayes OT at 11/18/2024  2:20 PM.  Learner: Patient  Readiness: Acceptance  Method: Explanation, Demonstration  Response: Verbalizes Understanding, Demonstrated Understanding  Comment: batsheva mob ADLs    Precautions, taught by Laura Mayes OT at 11/18/2024  2:20 PM.  Learner: Patient  Readiness: Acceptance  Method: Explanation, Demonstration  Response: Verbalizes Understanding, Demonstrated Understanding  Comment: fxnl mob ADLs    ADL Training, taught by Laura Mayes OT at 11/18/2024  2:20 PM.  Learner: Patient  Readiness: Acceptance  Method: Explanation, Demonstration  Response: Verbalizes Understanding, Demonstrated Understanding  Comment: fxnl mob ADLs    Body Mechanics, taught by Laura Mayes OT at 11/18/2024  2:20 PM.  Learner: Patient  Readiness: Acceptance  Method: Explanation, Demonstration  Response: Verbalizes Understanding, Demonstrated Understanding  Comment: fxnl mob, ADLs    Precautions, taught by Laura Mayes OT at 11/18/2024  2:20 PM.  Learner: Patient  Readiness: Acceptance  Method: Explanation, Demonstration  Response: Verbalizes Understanding, Demonstrated Understanding  Comment: fxnl mob, ADLs    ADL Training, taught by Laura Mayes OT at 11/18/2024  2:20 PM.  Learner: Patient  Readiness: Acceptance  Method: Explanation, Demonstration  Response: Verbalizes Understanding, Demonstrated  Understanding  Comment: batsheva mob, ADLs    Education Comments  No comments found.

## 2024-11-18 NOTE — PROGRESS NOTES
Patricia Mckeon is a 59 y.o. female on day 2 of admission presenting with Wound infection.      Subjective   Pt was seen  and examined this am, Pt has no acute event overnight. Addressed all of the patient concerns and explained the treatment plan.          Objective     Last Recorded Vitals  /77 (BP Location: Right arm, Patient Position: Lying)   Pulse 78   Temp 37 °C (98.6 °F) (Temporal)   Resp 18   Wt (!) 180 kg (396 lb 6.4 oz)   SpO2 95%   Intake/Output last 3 Shifts:    Intake/Output Summary (Last 24 hours) at 11/18/2024 1303  Last data filed at 11/18/2024 1100  Gross per 24 hour   Intake 1400 ml   Output 2250 ml   Net -850 ml       Admission Weight  Weight: (!) 181 kg (400 lb) (11/15/24 2316)    Daily Weight  11/16/24 : (!) 180 kg (396 lb 6.4 oz)    Image Results  XR chest 1 view  Narrative: Interpreted By:  Gilmar Guerra,   STUDY:  XR CHEST 1 VIEW;  11/16/2024 3:37 am      INDICATION:  Signs/Symptoms:infectious etiology/PNA r/o.      COMPARISON:  None.      ACCESSION NUMBER(S):  [id number]      ORDERING CLINICIAN:  MARTY ZAVALA      FINDINGS:  There is magnification of the cardiac silhouette secondary to AP  technique.. No focal airspace consolidation or pleural effusion. No  pneumothorax.      Impression: No airspace consolidation or pleural effusion.      MACRO:  None      Signed by: Gilmar Guerra 11/16/2024 3:48 AM  Dictation workstation:   [id number]  XR tibia fibula left 2 views  Narrative: Interpreted By:  Frandy Banks,  and Ankush Gama   STUDY:  XR TIBIA FIBULA LEFT 2 VIEWS; ;  11/16/2024 1:56 am      INDICATION:  Signs/Symptoms:? osteo with positive wound cultures and chronic  lymphedema.      COMPARISON:  Left tibia/fibula radiographs 07/04/2022. CT left lower extremity  05/04/2023.      ACCESSION NUMBER(S):  [id number]      ORDERING CLINICIAN:  MARTY ZAVALA      FINDINGS:  Left tibia/fibula, two views.      There is periosteal reaction seen about the mid  fibular shaft, which  appears slightly increased when compared to prior radiograph  07/04/2022. There is also periosteal reaction about the mid tibial  diaphysis, new compared to prior. Soft tissue defects are present  about lateral aspect of the foot. Evaluation for soft tissue gas is  limited due to marked edema.      No acute fracture. Normal alignment.      Multiple surgical clips overlie the mid to distal tibia/fibula.      Impression: Very limited exam due to marked edema and significant soft tissue  overlap. Within these limitations:      Mid fibular diaphyseal shaft periosteal reaction has slightly  increased compared to prior. Additionally, mid tibial periosteal  reaction is new compared to prior. Findings can be seen with chronic  osteomyelitis although an acute on chronic component is difficult to  evaluate. Evaluation for soft tissue gas is limited given the amount  of overlying soft tissue. If clinically relevant, MRI can be helpful  for further characterization of osteomyelitis.      No acute fracture.      I personally reviewed the images/study and I agree with the findings  as stated by Natan Ansari MD (Radiology Resident).      MACRO:  None      Signed by: Frandy Banks 11/16/2024 3:35 AM  Dictation workstation:   [id number]      Physical Exam    Constitutional:       Appearance: Normal appearance. She is obese.   HENT:      Head: Normocephalic and atraumatic.      Nose: Nose normal.      Mouth/Throat:      Mouth: Mucous membranes are moist.   Eyes:      Extraocular Movements: Extraocular movements intact.      Conjunctiva/sclera: Conjunctivae normal.      Pupils: Pupils are equal, round, and reactive to light.   Cardiovascular:      Rate and Rhythm: Normal rate and regular rhythm.   Pulmonary:      Effort: Pulmonary effort is normal.      Breath sounds: Normal breath sounds.   Abdominal:      General: Abdomen is flat. Bowel sounds are normal.      Palpations: Abdomen is soft.    Musculoskeletal:         General: Swelling and tenderness present.      Cervical back: Normal range of motion and neck supple.      Comments: Stigmata of chronic lymphedema of bilateral lower extremities noted; left lower extremity noted to be dressed on the lateral side; however, no warmth to touch noted, drainage and seeping through dressing noted in the left lower extremity.   Skin:     Capillary Refill: Capillary refill takes 2 to 3 seconds.   Neurological:      General: No focal deficit present.      Mental Status: She is alert and oriented to person, place, and time. Mental status is at baseline.   Relevant Results               Assessment/Plan                  Assessment & Plan  Wound infection      Patricia Mckeon is a 59-year-old F PMH of desmoid tumor s/p resection, chronic lymphedema, recurrent cellulitis, necrotizing fasciitis , anxiety/depression, and morbid obesity presenting for nonhealing ulcers of the left lower extremity with concern for superimposed skin soft tissue infection.  Elevated inflammatory markers, wound cultures from recent wound care visit growing Pseudomonas aeruginosa and corynebacterium striatum.  Started on broad-spectrum antibiotics.  Tib-fib x-ray of the left lower extremity showing concern for osteomyelitis. MRI ordered and is pending. Podiatry consulted. Wound care requested. ID consulted.        #Recurrent Skin soft tissue infection left lower extremity   #Possible osteomyelitis in the context of chronic lymphedema vs cellulitis vs chronic venous stasis given no leukocytosis  -X-ray showing periosteal changes concerning for OM, MRI is pending   -Recent cultures on 11/11 growing Pseudomonas aeruginosa and corynebacterium striatum  -Started on empiric vancomycin and Zosyn, follow-up sensitivities and narrow antibiotics as indicated  -Wound care requested.       #Anxiety/depression  -Continue home buspirone 5 mg twice daily and hydroxyzine 25 mg as needed     #Bilateral lower  extremity edema  #Chronic lymphedema  -Continue home Lasix 40 mg daily     F: None  E: Replete as needed  N: Regular  GI: PPI  DVT: Lovenox  CODE STATUS: Full code           Cellulitis of LLE  Chronic lymphedema  Morbid obesity (BMI 70)        50 min                   Earline Franklin MD

## 2024-11-18 NOTE — CONSULTS
Inpatient consult to Infectious Diseases  Consult performed by: Shun Nielsen MD  Consult ordered by: Earline Franklin MD        Referred by Earline Franklin MD     Primary MD: Ramesh Rothman DO    Reason For Consult  Left leg cellulitis/osteomyelitis on fibula    History Of Present Illness  Patricia Mckeon is a 59 y.o. female with hx of desmoid tumor and severe obesity,   she had left abdominal desmoid tumor surgery in 2016 and since then,  had severe lymphedema on both legs.  Veronica on left leg.   She had multiple episodes of cellulitis /necrotizing fasciitis on left leg over years with multiple debridement surgeries . She had split thickness skin graft in march 2020.    Has been seeing wound care clinic weekly and lately, noticed worsening pain with pustular discharge on lateral side of left leg below skin fold.   Wound culture was taken at wound clinic which grew Pseudomonas and Corynebacterium and was sent to hospital for IV antibiotic,    She was seen by ID team in 2019. 2020, 2022, 2023 with similar situations and was treated with vancomycin/piperacillin-tazobactam ( 2019.  No culture data), piperacillin-tazobactam/fluconazole ( 2020, culture grew mixed bacteria and Candida parapsilosis),  cephalexin ( 2022, no culture data), and vancomycin /piperacillin-tazobactam and TMP-SMX and ciprofloxacin ( 2023, culture grew MRSA.,  Corynebacterium striatum and pseudomonas aeruginosa) .  She also had c diff colitis in 2020.   She had x ray which showed evidence of osteomyelitis on left fibula and MRI has been ordered.   She was seen by podiatry who suggested no surgery.     Past Medical History  She has a past medical history of Abdominal aortic aneurysm, without rupture, unspecified (CMS-HCC) and Cellulitis of left lower limb.    Surgical History  She has a past surgical history that includes Other surgical history (11/08/2019); Other surgical history (11/08/2019); Other surgical history (11/08/2019); Other surgical  history (04/14/2020); Other surgical history (04/14/2020); and MR angio head wo IV contrast (1/24/2020).     Social History     Occupational History    Not on file   Tobacco Use    Smoking status: Never    Smokeless tobacco: Never   Substance and Sexual Activity    Alcohol use: Not Currently     Comment: very rarely    Drug use: Never    Sexual activity: Not on file     Travel History   Travel since 10/18/24    No documented travel since 10/18/24            Pets: unknown  Hobbies: unknown    Family History  No family history on file.  Allergies  Latex and Sulfa (sulfonamide antibiotics)     Immunization History   Administered Date(s) Administered    Flu vaccine, trivalent, preservative free, age 6 months and greater (Fluarix/Fluzone/Flulaval) 09/17/2024    Pfizer Purple Cap SARS-CoV-2 06/12/2021, 07/03/2021     Medications  Home medications:  Medications Prior to Admission   Medication Sig Dispense Refill Last Dose/Taking    busPIRone (Buspar) 5 mg tablet Take 1 tablet (5 mg) by mouth 2 times a day. 60 tablet 2     furosemide (Lasix) 40 mg tablet Take 1 tablet (40 mg) by mouth once daily. 30 tablet 11     gabapentin (Neurontin) 300 mg capsule 1 am 1 afternoon 2 qhs 360 capsule 1     hydrOXYzine HCL (Atarax) 25 mg tablet Take 1 tablet (25 mg) by mouth 3 times a day as needed for anxiety. 270 tablet 1     ibuprofen 200 mg tablet Take 3 tablets (600 mg) by mouth every 8 hours if needed for mild pain (1 - 3) or moderate pain (4 - 6).        Current medications:  Scheduled medications  busPIRone, 5 mg, oral, BID  enoxaparin, 60 mg, subcutaneous, q12h PILLO  furosemide, 40 mg, oral, Daily  gabapentin, 300 mg, oral, Daily  pantoprazole, 40 mg, oral, Daily before breakfast   Or  pantoprazole, 40 mg, intravenous, Daily before breakfast  piperacillin-tazobactam, 3.375 g, intravenous, q8h  polyethylene glycol, 17 g, oral, Daily  vancomycin, 1,750 mg, intravenous, q12h      Continuous medications     PRN medications  PRN  medications: acetaminophen **OR** [DISCONTINUED] acetaminophen **OR** [DISCONTINUED] acetaminophen, hydrOXYzine HCL, melatonin, ondansetron **OR** ondansetron, oxyCODONE, traMADol, vancomycin    Review of Systems   Constitutional:  Negative for chills, diaphoresis and fever.   Respiratory:  Negative for cough and shortness of breath.    Gastrointestinal:  Negative for abdominal pain and diarrhea.   Skin:  Negative for rash.   Psychiatric/Behavioral:  Negative for confusion.         Objective  Range of Vitals (last 24 hours)  Heart Rate:  [78-95]   Temp:  [36.2 °C (97.2 °F)-37 °C (98.6 °F)]   Resp:  [17-18]   BP: (148-170)/(70-81)   SpO2:  [95 %-98 %]   Daily Weight  11/16/24 : (!) 180 kg (396 lb 6.4 oz)    Body mass index is 70.22 kg/m².     Physical Exam  Vitals reviewed.   Constitutional:       General: She is not in acute distress.     Appearance: She is obese. She is not ill-appearing.   Eyes:      Conjunctiva/sclera: Conjunctivae normal.   Cardiovascular:      Rate and Rhythm: Normal rate and regular rhythm.      Pulses: Normal pulses.      Heart sounds: Normal heart sounds.   Pulmonary:      Effort: Pulmonary effort is normal.      Breath sounds: Normal breath sounds.   Abdominal:      General: Abdomen is flat.      Palpations: Abdomen is soft.   Musculoskeletal:         General: No swelling.      Cervical back: Neck supple.      Right lower leg: Edema present.      Left lower leg: Edema present.   Skin:     General: Skin is warm.      Comments: Severe lymphedema on left leg with elephantiasis.   Erythema and tenderness on lateral distal thigh and leg with purulent discharge on left leg    Neurological:      General: No focal deficit present.      Mental Status: She is alert.   Psychiatric:         Mood and Affect: Mood normal.          Relevant Results  Outside Hospital Results  No  Labs  Results from last 72 hours   Lab Units 11/18/24  0551 11/16/24  0423 11/16/24  0044   WBC AUTO x10*3/uL 4.7 6.8 8.3  "  HEMOGLOBIN g/dL 9.8* 10.4* 11.6*   HEMATOCRIT % 33.7* 33.1* 38.0   PLATELETS AUTO x10*3/uL 253 287 334   NEUTROS PCT AUTO %  --   --  65.1   LYMPHS PCT AUTO %  --   --  24.7   MONOS PCT AUTO %  --   --  7.2   EOS PCT AUTO %  --   --  2.2     Results from last 72 hours   Lab Units 11/18/24 0551 11/16/24 0423 11/16/24 0044   SODIUM mmol/L 137 141 140   POTASSIUM mmol/L 3.7 3.8 4.2   CHLORIDE mmol/L 105 106 104   CO2 mmol/L 25 26 27   BUN mg/dL 14 21 21   CREATININE mg/dL 0.88 0.88 0.92   GLUCOSE mg/dL 141* 115* 107*   CALCIUM mg/dL 8.0* 8.7 9.2   ANION GAP mmol/L 11 13 13   EGFR mL/min/1.73m*2 77 77 73   PHOSPHORUS mg/dL 3.0  --   --      Results from last 72 hours   Lab Units 11/18/24 0551 11/16/24 0423 11/16/24 0044   ALK PHOS U/L  --  48 59   BILIRUBIN TOTAL mg/dL  --  0.5 0.4   PROTEIN TOTAL g/dL  --  7.1 8.4*   ALT U/L  --  6* 8   AST U/L  --  9 11   ALBUMIN g/dL 3.2* 3.5 4.1     Estimated Creatinine Clearance: 114.7 mL/min (by C-G formula based on SCr of 0.88 mg/dL).  C-Reactive Protein   Date Value Ref Range Status   11/16/2024 2.61 (H) <1.00 mg/dL Final     CRP   Date Value Ref Range Status   05/04/2023 7.65 (A) mg/dL Final     Comment:     REF VALUE  < 1.00     07/11/2022 3.99 (A) mg/dL Final     Comment:     REF VALUE  < 1.00       Sedimentation Rate   Date Value Ref Range Status   11/16/2024 101 (H) 0 - 30 mm/h Final   05/04/2023 75 (H) 0 - 30 mm/h Final   07/04/2022 101 (H) 0 - 30 mm/h Final     No results found for: \"HIV1X2\", \"HIVCONF\", \"AFCJDG0OM\"  No results found for: \"HEPCABINIT\", \"HEPCAB\", \"HCVPCRQUANT\"  Microbiology  Susceptibility data from last 90 days.  Collected Specimen Info Organism Aztreonam Cefepime Ceftazidime Ceftriaxone Ciprofloxacin Gentamicin Levofloxacin Penicillin Piperacillin/Tazobactam Tetracycline Tobramycin Vancomycin   11/11/24 Tissue/Biopsy from Wound/Tissue Pseudomonas aeruginosa  S  S  S   S   S   S   S      Corynebacterium striatum group     R  R  I   R   R   S "   09/09/24 Tissue/Biopsy from Wound/Tissue Pseudomonas aeruginosa  S  S  S   S   S   S   S      Mixed Gram-Positive and Gram-Negative Bacteria                 11/16 left tibia fibula x ray   Very limited exam due to marked edema and significant soft tissue  overlap. Within these limitations:      Mid fibular diaphyseal shaft periosteal reaction has slightly  increased compared to prior. Additionally, mid tibial periosteal  reaction is new compared to prior. Findings can be seen with chronic  osteomyelitis although an acute on chronic component is difficult to  evaluate. Evaluation for soft tissue gas is limited given the amount  of overlying soft tissue. If clinically relevant, MRI can be helpful  for further characterization of osteomyelitis.        Temp Readings from Last 5 Encounters:   11/18/24 36.2 °C (97.2 °F) (Temporal)   05/03/24 36.5 °C (97.7 °F) (Temporal)   05/01/24 36.3 °C (97.3 °F) (Temporal)   04/26/24 36.3 °C (97.4 °F) (Temporal)   04/22/24 36.2 °C (97.2 °F) (Temporal)       Estimated Creatinine Clearance: 114.7 mL/min (by C-G formula based on SCr of 0.88 mg/dL).      Antibiotic history   Vancomycin /piperacillin-tazobactam   11/15~      INFECTIOUS DISEASE SYNOPSIS AND ASSESSMENT  60yo female with hx of obesity and severe lymphedema with multiple skin/soft tissue cellulitis requiring multiple debridements and antibiotic in the past .   Currently has another infection with culture growing Corynebacterium and pseudomonas . Her x ray was limited study but she doesn't have deep open wound or evidence of sinus tract , all of which suggests against osteomyelitis.   She doesn't need MRI from ID standpoint.   We shall treat her as skin and soft tissue infection targeting pseudomonas .   Would not try to cover corynebacterium.       Left leg cellulitis with chronic severe lymphedema       - culture grew pseudomonas and corynebacterium    RECOMMENDATION   DISCONTINUE vancomycin and  piperacillin-tazobactam  Start IV cefepime 2 gram q 8 hours.  Once there is clinical improvement it can be transitioned to oral ciprofloxacin to complete total 2-week course.  Wound care consult  No need for MRI from ID standpoint      ID will sign off ,  please call back if you have any further questions.     The case was discussed with my attending , Dr. Josh Webb who agreed with my assessment and plan.    DELIA KANG M.D /  Infectious disease consult team B  Infectious disease fellow   Please try to reach me through [place] [facility] for any questions.      A 59-year-old female patient with history of bilateral lower extremity severe lymphedema and multiple episodes of cellulitis is admitted to the hospital for positive wound cultures from left leg.  Patient follows with wound care every week.  Recently patient had wound culture performed which grew Pseudomonas and Corynebacterium stratum and so she was recommended to hospital admission.    Patient states there is some pain over left leg but denied fever or chills.  Patient denied any increased drainage from left leg.    Exam:  Gross lymphedema of both legs (left more than right).  There is a overhanging pannus over left leg and encircling it completely.  A 4 inch diameter ulcer noted over bottom of the pannus but no purulence.  There is some maceration underneath the pannus with erythema over dependent areas but no significant tenderness noted    Problems  Cellulitis    Plan  Patient is doing an excellent job with assistance from wound care and keeping this area clean and good wound care.  Patient had stayed out of the hospital for almost a year.  Corynebacterium stratum is generally not considered a pathogenic organism.  Recommend to stop vancomycin and Zosyn and start cefepime 2 g IV every 8 hours to target Pseudomonas and avoid anaerobic coverage as patient has history of recurrent C. difficile.  Once there is some clinical improvement cefepime can be  switched to oral ciprofloxacin 750 mg p.o. every 12 hours to complete total 2-week course.  Please consult wound care nurse for wound care while patient is in the hospital.  Patient already has outpatient wound care and scheduled to see lymphedema clinic also.  Although x-ray shows some reactive changes, clinically there is no concern for osteomyelitis and so MRI of leg is not needed from ID standpoint.  Defer to primary service.  ID will sign off.    I saw and evaluated the patient. I personally obtained the key and critical portions of the history and physical exam or was physically present for key and critical portions performed by the resident/fellow. I reviewed the resident/fellow's documentation and discussed the patient with the resident/fellow. I agree with the resident/fellow's medical decision making as documented in the note.

## 2024-11-18 NOTE — PROGRESS NOTES
Physical Therapy                 Therapy Communication Note    Patient Name: Patricia Mckeon  MRN: [id number]  Department: [facility] 55  Room: 55/5512-A  Today's Date: 11/18/2024     Discipline: Physical Therapy    Missed Visit Reason:  (Pt recently returned back to bed after having worked with OT. Pt now with daughter visiting and wanting to rest. Will reattempt PT later in day as pt tolerates.)    Missed Time: Attempt

## 2024-11-18 NOTE — PROGRESS NOTES
Vancomycin Dosing by Pharmacy- FOLLOW UP    Patricia Mckeon is a 59 y.o. year old female who Pharmacy has been consulted for vancomycin dosing for cellulitis, skin and soft tissue. Based on the patient's indication and renal status this patient is being dosed based on a goal AUC of 400-600.     Renal function is currently stable.    Current vancomycin dose: 1750 mg given every 12 hours    Estimated vancomycin AUC on current dose: 590 mg/L.hr     Visit Vitals  /77 (BP Location: Right arm, Patient Position: Lying)   Pulse 78   Temp 37 °C (98.6 °F) (Temporal)   Resp 18        Lab Results   Component Value Date    CREATININE 0.88 2024    CREATININE 0.88 2024    CREATININE 0.92 2024    CREATININE 0.92 2024        Patient weight is as follows:   Vitals:    24 0514   Weight: (!) 180 kg (396 lb 6.4 oz)       Cultures:  No results found for the encounter in last 14 days.       I/O last 3 completed shifts:  In: 1400 (7.8 mL/kg) [IV Piggyback:1400]  Out: 1550 (8.6 mL/kg) [Urine:1550 (0.2 mL/kg/hr)]  Weight: 179.8 kg   I/O during current shift:  No intake/output data recorded.    Temp (24hrs), Av.6 °C (97.8 °F), Min:36.2 °C (97.2 °F), Max:37 °C (98.6 °F)      Assessment/Plan    Within goal AUC range. Continue current vancomycin regimen.    This dosing regimen is predicted by [employer] to result in the following pharmacokinetic parameters:  Regimen: 1750 mg IV every 12 hours.  Start time: 16:24 on 2024  Exposure target: AUC24 (range)400-600 mg/L.hr   WIN24-06: 592 mg/L.hr  AUC24,ss: 592 mg/L.hr  Probability of AUC24 > 400: 91 %  Ctrough,ss: 11.4 mg/L  Probability of Ctrough,ss > 20: 24 %    The next level will be obtained on  at 1000. May be obtained sooner if clinically indicated.   Will continue to monitor renal function daily while on vancomycin and order serum creatinine at least every 48 hours if not already ordered.  Follow for continued vancomycin needs, clinical response,  and signs/symptoms of toxicity.       Ben Juárez, PharmD

## 2024-11-18 NOTE — PROGRESS NOTES
Vancomycin Dosing by Pharmacy- Cessation of Therapy    Consult to pharmacy for vancomycin dosing has been discontinued by the prescriber, pharmacy will sign off at this time.    Please call pharmacy if there are further questions or re-enter a consult if vancomycin is resumed.     Nohemi Zuñiga, PharmD

## 2024-11-18 NOTE — CARE PLAN
The clinical goals for the shift include Pt will remnain yasmin from falls or injury throughout the shift by 11/18/2024 at 0700. MET    Problem: Pain - Adult  Goal: Verbalizes/displays adequate comfort level or baseline comfort level  Outcome: Progressing     Problem: Chronic Conditions and Co-morbidities  Goal: Patient's chronic conditions and co-morbidity symptoms are monitored and maintained or improved  Outcome: Progressing     Problem: Skin  Goal: Participates in plan/prevention/treatment measures  Outcome: Progressing     Problem: Skin  Goal: Prevent/manage excess moisture  Outcome: Progressing  Flowsheets (Taken 11/18/2024 0630)  Prevent/manage excess moisture: Cleanse incontinence/protect with barrier cream     Problem: Safety - Adult  Goal: Free from fall injury  Outcome: Met

## 2024-11-19 ENCOUNTER — APPOINTMENT (OUTPATIENT)
Dept: PHYSICAL THERAPY | Facility: HOSPITAL | Age: 59
End: 2024-11-19
Payer: MEDICARE

## 2024-11-19 ENCOUNTER — DOCUMENTATION (OUTPATIENT)
Dept: HOME HEALTH SERVICES | Facility: HOME HEALTH | Age: 59
End: 2024-11-19
Payer: MEDICARE

## 2024-11-19 ENCOUNTER — HOME HEALTH ADMISSION (OUTPATIENT)
Dept: HOME HEALTH SERVICES | Facility: HOME HEALTH | Age: 59
End: 2024-11-19
Payer: MEDICARE

## 2024-11-19 ENCOUNTER — PHARMACY VISIT (OUTPATIENT)
Dept: PHARMACY | Facility: CLINIC | Age: 59
End: 2024-11-19
Payer: MEDICARE

## 2024-11-19 VITALS
RESPIRATION RATE: 18 BRPM | WEIGHT: 293 LBS | TEMPERATURE: 97.2 F | OXYGEN SATURATION: 96 % | SYSTOLIC BLOOD PRESSURE: 141 MMHG | BODY MASS INDEX: 51.91 KG/M2 | HEART RATE: 78 BPM | DIASTOLIC BLOOD PRESSURE: 78 MMHG | HEIGHT: 63 IN

## 2024-11-19 LAB
ALBUMIN SERPL BCP-MCNC: 3.2 G/DL (ref 3.4–5)
ANION GAP SERPL CALC-SCNC: 11 MMOL/L (ref 10–20)
BUN SERPL-MCNC: 13 MG/DL (ref 6–23)
CALCIUM SERPL-MCNC: 8.5 MG/DL (ref 8.6–10.6)
CHLORIDE SERPL-SCNC: 106 MMOL/L (ref 98–107)
CO2 SERPL-SCNC: 28 MMOL/L (ref 21–32)
CREAT SERPL-MCNC: 0.97 MG/DL (ref 0.5–1.05)
EGFRCR SERPLBLD CKD-EPI 2021: 68 ML/MIN/1.73M*2
ERYTHROCYTE [DISTWIDTH] IN BLOOD BY AUTOMATED COUNT: 17.1 % (ref 11.5–14.5)
GLUCOSE SERPL-MCNC: 105 MG/DL (ref 74–99)
HCT VFR BLD AUTO: 34.8 % (ref 36–46)
HGB BLD-MCNC: 10.1 G/DL (ref 12–16)
MCH RBC QN AUTO: 26.1 PG (ref 26–34)
MCHC RBC AUTO-ENTMCNC: 29 G/DL (ref 32–36)
MCV RBC AUTO: 90 FL (ref 80–100)
NRBC BLD-RTO: 0 /100 WBCS (ref 0–0)
PHOSPHATE SERPL-MCNC: 2.9 MG/DL (ref 2.5–4.9)
PLATELET # BLD AUTO: 278 X10*3/UL (ref 150–450)
POTASSIUM SERPL-SCNC: 3.8 MMOL/L (ref 3.5–5.3)
RBC # BLD AUTO: 3.87 X10*6/UL (ref 4–5.2)
SODIUM SERPL-SCNC: 141 MMOL/L (ref 136–145)
WBC # BLD AUTO: 5 X10*3/UL (ref 4.4–11.3)

## 2024-11-19 PROCEDURE — 2500000004 HC RX 250 GENERAL PHARMACY W/ HCPCS (ALT 636 FOR OP/ED)

## 2024-11-19 PROCEDURE — 2500000001 HC RX 250 WO HCPCS SELF ADMINISTERED DRUGS (ALT 637 FOR MEDICARE OP): Performed by: STUDENT IN AN ORGANIZED HEALTH CARE EDUCATION/TRAINING PROGRAM

## 2024-11-19 PROCEDURE — 2500000004 HC RX 250 GENERAL PHARMACY W/ HCPCS (ALT 636 FOR OP/ED): Mod: JZ | Performed by: STUDENT IN AN ORGANIZED HEALTH CARE EDUCATION/TRAINING PROGRAM

## 2024-11-19 PROCEDURE — 99239 HOSP IP/OBS DSCHRG MGMT >30: CPT | Performed by: STUDENT IN AN ORGANIZED HEALTH CARE EDUCATION/TRAINING PROGRAM

## 2024-11-19 PROCEDURE — 2500000001 HC RX 250 WO HCPCS SELF ADMINISTERED DRUGS (ALT 637 FOR MEDICARE OP)

## 2024-11-19 PROCEDURE — 36415 COLL VENOUS BLD VENIPUNCTURE: CPT | Performed by: STUDENT IN AN ORGANIZED HEALTH CARE EDUCATION/TRAINING PROGRAM

## 2024-11-19 PROCEDURE — RXMED WILLOW AMBULATORY MEDICATION CHARGE

## 2024-11-19 PROCEDURE — 85027 COMPLETE CBC AUTOMATED: CPT | Performed by: STUDENT IN AN ORGANIZED HEALTH CARE EDUCATION/TRAINING PROGRAM

## 2024-11-19 PROCEDURE — 80069 RENAL FUNCTION PANEL: CPT | Performed by: STUDENT IN AN ORGANIZED HEALTH CARE EDUCATION/TRAINING PROGRAM

## 2024-11-19 RX ORDER — CIPROFLOXACIN 500 MG/1
500 TABLET ORAL 2 TIMES DAILY
Qty: 28 TABLET | Refills: 0 | Status: SHIPPED | OUTPATIENT
Start: 2024-11-19 | End: 2024-12-03

## 2024-11-19 RX ADMIN — FUROSEMIDE 40 MG: 40 TABLET ORAL at 09:12

## 2024-11-19 RX ADMIN — OXYCODONE HYDROCHLORIDE 5 MG: 5 TABLET ORAL at 09:12

## 2024-11-19 RX ADMIN — PANTOPRAZOLE SODIUM 40 MG: 40 TABLET, DELAYED RELEASE ORAL at 06:16

## 2024-11-19 RX ADMIN — CEFEPIME HYDROCHLORIDE 2 G: 2 INJECTION, SOLUTION INTRAVENOUS at 06:16

## 2024-11-19 RX ADMIN — OXYCODONE HYDROCHLORIDE 5 MG: 5 TABLET ORAL at 01:58

## 2024-11-19 RX ADMIN — BUSPIRONE HYDROCHLORIDE 5 MG: 5 TABLET ORAL at 09:12

## 2024-11-19 RX ADMIN — CEFEPIME HYDROCHLORIDE 2 G: 2 INJECTION, SOLUTION INTRAVENOUS at 15:50

## 2024-11-19 RX ADMIN — ENOXAPARIN SODIUM 60 MG: 60 INJECTION SUBCUTANEOUS at 09:13

## 2024-11-19 RX ADMIN — GABAPENTIN 300 MG: 300 CAPSULE ORAL at 09:12

## 2024-11-19 ASSESSMENT — COGNITIVE AND FUNCTIONAL STATUS - GENERAL
STANDING UP FROM CHAIR USING ARMS: A LITTLE
HELP NEEDED FOR BATHING: A LITTLE
TURNING FROM BACK TO SIDE WHILE IN FLAT BAD: A LITTLE
PERSONAL GROOMING: A LITTLE
DRESSING REGULAR LOWER BODY CLOTHING: A LITTLE
DRESSING REGULAR UPPER BODY CLOTHING: A LITTLE
DAILY ACTIVITIY SCORE: 19
WALKING IN HOSPITAL ROOM: A LOT
MOVING FROM LYING ON BACK TO SITTING ON SIDE OF FLAT BED WITH BEDRAILS: A LITTLE
MOBILITY SCORE: 16
CLIMB 3 TO 5 STEPS WITH RAILING: A LOT
TOILETING: A LITTLE
MOVING TO AND FROM BED TO CHAIR: A LITTLE

## 2024-11-19 ASSESSMENT — PAIN DESCRIPTION - LOCATION: LOCATION: LEG

## 2024-11-19 ASSESSMENT — PAIN SCALES - GENERAL
PAINLEVEL_OUTOF10: 0 - NO PAIN
PAINLEVEL_OUTOF10: 7
PAINLEVEL_OUTOF10: 7

## 2024-11-19 ASSESSMENT — PAIN - FUNCTIONAL ASSESSMENT
PAIN_FUNCTIONAL_ASSESSMENT: 0-10

## 2024-11-19 NOTE — HH CARE COORDINATION
Home Care received a Referral for Nursing and Physical Therapy. We have processed the referral for a Start of Care on 11.20 or 11.21.2024.     If you have any questions or concerns, please feel free to contact us at [phone]. Follow the prompts, enter your five digit zip code, and you will be directed to your care team on [facility].

## 2024-11-19 NOTE — PROGRESS NOTES
11/19/24 1232   Discharge Planning   Home or Post Acute Services In home services   Type of Home Care Services Home PT;Home nursing visits  (Wound care)   Expected Discharge Disposition Home Health   Does the patient need discharge transport arranged? No   RoundTrip coordination needed? No   Has discharge transport been arranged? No     Transitional Care Coordination Discharge Planning Note:  Patient medically ready for discharge 11/19  Patient going home with HC for wound care and PT. This TCC educated pt on their freedom of choice. Pt choose [facility]. [facility] nurse notified, awaiting response and SOC date.     This TCC will continue to follow and update the plan as needed.    Lisa Bolton  PRN, [facility]  [phone]  [email or web address]

## 2024-11-19 NOTE — PROGRESS NOTES
Physical Therapy Screen                 Therapy Communication Note    Patient Name: Patricia Mckeon  MRN: [id number]  Department: [facility] 55  Room: 55/55-  Today's Date: 11/19/2024     Discipline: Physical Therapy    PT Screen. Pt seated on couch upon PT entry. Pt declining any PT needs at this time. Reports independent mobility and function. Anticipated DC home today. Will DC PT at this time. Pt agrees.

## 2024-11-19 NOTE — CONSULTS
"Nutrition Note:   Nutrition Assessment    Reason for Assessment: Admission nursing screening (stage 3 or 4 pressure ulcers)    Patient is a 59 y.o. female presenting with nonhealing ulcers of the left lower extremity with concern for superimposed skin soft tissue infection.     It is noted that pt is medically ready for discharge (plan for today)     PMH of desmoid tumor s/p resection, chronic lymphedema, recurrent cellulitis, necrotizing fasciitis , anxiety/depression, and morbid obesity       Nutrition History:  Food and Nutrient History: Pt had just received her lunch tray. Noted she has not had any recent decline in intake. Mentioned she knows to prioritize protein for her wounds - explained she likes eggs, chicken, beans, etc. Reported no questions or need for education at this time  Vitamin/Herbal Supplement Use: Denied       Anthropometrics:  Height: 160 cm (5' 3\")   Weight: (!) 180 kg (396 lb 6.4 oz)   BMI (Calculated): 70.24      Weight History:   Wt Readings from Last 10 Encounters:   11/16/24 (!) 180 kg (396 lb 6.4 oz)   09/17/24 (!) 177 kg (390 lb)   04/01/24 (!) 171 kg (376 lb 12.8 oz)   03/11/24 (!) 168 kg (370 lb 14.4 oz)   02/20/24 127 kg (280 lb)   01/30/24 (!) 169 kg (371 lb 9.6 oz)   01/02/24 (!) 176 kg (388 lb 3.2 oz)   10/09/23 (!) 171 kg (376 lb 3.2 oz)   09/11/23 (!) 169 kg (372 lb)   07/27/23 (!) 166 kg (367 lb)     Weight Change %:  Significant Weight Loss: No    Nutrition Significant Labs:  CBC Trend:   Results from last 7 days   Lab Units 11/19/24  0531 11/18/24  0551 11/16/24  0423 11/16/24  0044   WBC AUTO x10*3/uL 5.0 4.7 6.8 8.3   RBC AUTO x10*6/uL 3.87* 3.70* 3.90* 4.44   HEMOGLOBIN g/dL 10.1* 9.8* 10.4* 11.6*   HEMATOCRIT % 34.8* 33.7* 33.1* 38.0   MCV fL 90 91 85 86   PLATELETS AUTO x10*3/uL 278 253 287 334    , BMP Trend:   Results from last 7 days   Lab Units 11/19/24  0531 11/18/24  0551 11/16/24  0423 11/16/24  0044   GLUCOSE mg/dL 105* 141* 115* 107*   CALCIUM mg/dL 8.5* 8.0* " 8.7 9.2   SODIUM mmol/L 141 137 141 140   POTASSIUM mmol/L 3.8 3.7 3.8 4.2   CO2 mmol/L 28 25 26 27   CHLORIDE mmol/L 106 105 106 104   BUN mg/dL 13 14 21 21   CREATININE mg/dL 0.97 0.88 0.88 0.92    , A1C:  Lab Results   Component Value Date    HGBA1C 5.6 07/04/2022   , Renal Lab Trend:   Results from last 7 days   Lab Units 11/19/24  0531 11/18/24  0551 11/16/24  0423 11/16/24  0423 11/16/24  0044   POTASSIUM mmol/L 3.8 3.7  --  3.8 4.2   PHOSPHORUS mg/dL 2.9 3.0   < >  --   --    SODIUM mmol/L 141 137  --  141 140   MAGNESIUM mg/dL  --   --   --   --  2.14   EGFR mL/min/1.73m*2 68 77  --  77 73   BUN mg/dL 13 14  --  21 21   CREATININE mg/dL 0.97 0.88  --  0.88 0.92    < > = values in this interval not displayed.    , Vit D:   Lab Results   Component Value Date    VITD25 16 (A) 02/14/2020      I/O:   Last BM Date: 11/18/24;      Dietary Orders (From admission, onward)       Start     Ordered    11/16/24 0546  May Participate in Room Service  ( ROOM SERVICE MAY PARTICIPATE)  Once        Question:  .  Answer:  Yes    11/16/24 0545    11/16/24 0319  Adult diet Regular  Diet effective now        Question:  Diet type  Answer:  Regular    11/16/24 0320                  Nutrition Interventions/Recommendations         Nutrition Prescription:  Recommendations for discharge:   Outpatient RDN referral (pt said she would find this helpful).     Check current 25(OH)D level and replete as needed.     In setting non-healing wound:   220mg zinc sulfate x 10-14 days and then re-assess need.  550mg vitamin C BID x 10-14 days and then re-assess need.   Iron panel + Ferritin and supplement as needed.       Nutrition Monitoring and Evaluation      Time Spent (min): 45 minutes

## 2024-11-19 NOTE — CARE PLAN
The patient's goals for the shift include      The clinical goals for the shift include patient will report tolerable pain levels by end of shift.    Problem: Pain - Adult  Goal: Verbalizes/displays adequate comfort level or baseline comfort level  Outcome: Progressing     Problem: Discharge Planning  Goal: Discharge to home or other facility with appropriate resources  Outcome: Progressing     Problem: Chronic Conditions and Co-morbidities  Goal: Patient's chronic conditions and co-morbidity symptoms are monitored and maintained or improved  Outcome: Progressing     Problem: Skin  Goal: Decreased wound size/increased tissue granulation at next dressing change  Outcome: Progressing  Goal: Prevent/manage excess moisture  Outcome: Progressing  Goal: Prevent/minimize sheer/friction injuries  Outcome: Progressing  Goal: Promote/optimize nutrition  Outcome: Progressing  Goal: Promote skin healing  Outcome: Progressing

## 2024-11-19 NOTE — DISCHARGE SUMMARY
INTERNAL MEDICINE DISCHARGE SUMMARY             Discharge Diagnosis   Wound infection    Issues Requiring Follow-Up   Chronic wound, cont wound care. Follow up with wound clinic     Test Results Pending At Discharge     Pending Labs       Order Current Status    Blood Culture Preliminary result    Blood Culture Preliminary result          Hospital Course     Patricia Mckeon is a 59-year-old F PMH of desmoid tumor s/p resection, chronic lymphedema, recurrent cellulitis, necrotizing fasciitis , anxiety/depression, and morbid obesity presenting for nonhealing ulcers of the left lower extremity with concern for superimposed skin soft tissue infection.  Elevated inflammatory markers, wound cultures from recent wound care visit growing Pseudomonas aeruginosa and corynebacterium striatum.  Started on broad-spectrum antibiotics.  Tib-fib x-ray of the left lower extremity showing concern for osteomyelitis. MRI ordered and is pending. Podiatry consulted. Wound care consulted. ID consulted, patient was initially on Vanc zosyn that was transitioned to cefepime based on cultures results. MRI foot negative for osteo/.   Pt to be discharged home with 14 days course on cipro, and home wound care servevice      Pertinent Physical Exam At Time of Discharge     Physical Exam  Constitutional:       Appearance: Normal appearance. She is obese.   HENT:      Head: Normocephalic and atraumatic.      Nose: Nose normal.      Mouth/Throat:      Mouth: Mucous membranes are moist.   Eyes:      Extraocular Movements: Extraocular movements intact.      Conjunctiva/sclera: Conjunctivae normal.      Pupils: Pupils are equal, round, and reactive to light.   Cardiovascular:      Rate and Rhythm: Normal rate and regular rhythm.   Pulmonary:      Effort: Pulmonary effort is normal.      Breath sounds: Normal breath sounds.   Abdominal:      General: Abdomen is flat. Bowel sounds are normal.       Palpations: Abdomen is soft.   Musculoskeletal:         General: Swelling and tenderness present.      Cervical back: Normal range of motion and neck supple.      Comments: Stigmata of chronic lymphedema of bilateral lower extremities noted; left lower extremity noted to be dressed on the lateral side; however, no warmth to touch noted, drainage and seeping through dressing noted in the left lower extremity.   Skin:     Capillary Refill: Capillary refill takes 2 to 3 seconds.   Neurological:      General: No focal deficit present.      Mental Status: She is alert and oriented to person, place, and time. Mental status is at baseline.   Relevant Results     Home Medications        Medication List      START taking these medications     ciprofloxacin 500 mg tablet; Commonly known as: Cipro; Take 1 tablet   (500 mg) by mouth 2 times a day for 14 days.     CONTINUE taking these medications     busPIRone 5 mg tablet; Commonly known as: Buspar; Take 1 tablet (5 mg)   by mouth 2 times a day.   furosemide 40 mg tablet; Commonly known as: Lasix; Take 1 tablet (40 mg)   by mouth once daily.   gabapentin 300 mg capsule; Commonly known as: Neurontin; 1 am 1   afternoon 2 qhs   hydrOXYzine HCL 25 mg tablet; Commonly known as: Atarax; Take 1 tablet   (25 mg) by mouth 3 times a day as needed for anxiety.   ibuprofen 200 mg tablet     Outpatient Follow-Up     Future Appointments   Date Time Provider Department Center   11/20/2024 To Be Determined Shira Friedman RN [facility]   11/22/2024 To Be Determined Kacy Rivas PT [facility]   11/25/2024  1:00 PM MIKEL Lozoya [id number] [facility]   12/2/2024  1:00 PM MIKEL Lozoya [id number] [facility]       Reese Little MD

## 2024-11-19 NOTE — NURSING NOTE
Pt D/C'd from unit, IV removed.  Belongings with patient. Meds to bed delivered, no further questions at this time.

## 2024-11-20 ENCOUNTER — PATIENT OUTREACH (OUTPATIENT)
Dept: PRIMARY CARE | Facility: CLINIC | Age: 59
End: 2024-11-20
Payer: MEDICARE

## 2024-11-20 ENCOUNTER — HOME CARE VISIT (OUTPATIENT)
Dept: HOME HEALTH SERVICES | Facility: HOME HEALTH | Age: 59
End: 2024-11-20
Payer: MEDICARE

## 2024-11-20 ENCOUNTER — APPOINTMENT (OUTPATIENT)
Dept: PHYSICAL THERAPY | Facility: HOSPITAL | Age: 59
End: 2024-11-20
Payer: MEDICARE

## 2024-11-20 DIAGNOSIS — F41.9 ANXIETY: ICD-10-CM

## 2024-11-20 LAB
BACTERIA BLD CULT: NORMAL
BACTERIA BLD CULT: NORMAL

## 2024-11-20 PROCEDURE — 0023 HH SOC

## 2024-11-20 PROCEDURE — 1090000001 HH PPS REVENUE CREDIT

## 2024-11-20 PROCEDURE — G0299 HHS/HOSPICE OF RN EA 15 MIN: HCPCS | Mod: HHH

## 2024-11-20 PROCEDURE — 1090000002 HH PPS REVENUE DEBIT

## 2024-11-20 PROCEDURE — 169592 NO-PAY CLAIM PROCEDURE

## 2024-11-20 ASSESSMENT — ENCOUNTER SYMPTOMS
CHANGE IN APPETITE: UNCHANGED
PAIN: 1
MUSCLE WEAKNESS: 1
PAIN LOCATION - PAIN FREQUENCY: CONSTANT
DRY SKIN: 1
APPETITE LEVEL: GOOD
PAIN LOCATION - PAIN QUALITY: ACHE
PAIN LOCATION - PAIN SEVERITY: 7/10
PAIN LOCATION: LEFT KNEE
PERSON REPORTING PAIN: PATIENT

## 2024-11-20 ASSESSMENT — ACTIVITIES OF DAILY LIVING (ADL)
ENTERING_EXITING_HOME: MODERATE ASSIST
OASIS_M1830: 05

## 2024-11-20 NOTE — TELEPHONE ENCOUNTER
Rx Refill Request Telephone Encounter    Name:  Patricia Mckeon  :  [id number]  Medication Name: Hydroxyzine 25 MG        #270 Refill: 1  Take 1 tablet 3 times daily  [facility]  Date of last appointment: 24  Date of next appointment: 24  Best number to reach patient:

## 2024-11-21 ENCOUNTER — APPOINTMENT (OUTPATIENT)
Dept: PHYSICAL THERAPY | Facility: HOSPITAL | Age: 59
End: 2024-11-21
Payer: MEDICARE

## 2024-11-21 PROCEDURE — 1090000002 HH PPS REVENUE DEBIT

## 2024-11-21 PROCEDURE — 1090000001 HH PPS REVENUE CREDIT

## 2024-11-21 RX ORDER — HYDROXYZINE HYDROCHLORIDE 25 MG/1
25 TABLET, FILM COATED ORAL 3 TIMES DAILY PRN
Qty: 270 TABLET | Refills: 1 | Status: SHIPPED | OUTPATIENT
Start: 2024-11-21

## 2024-11-22 ENCOUNTER — HOME CARE VISIT (OUTPATIENT)
Dept: HOME HEALTH SERVICES | Facility: HOME HEALTH | Age: 59
End: 2024-11-22
Payer: MEDICARE

## 2024-11-22 ENCOUNTER — APPOINTMENT (OUTPATIENT)
Dept: PHYSICAL THERAPY | Facility: HOSPITAL | Age: 59
End: 2024-11-22
Payer: MEDICARE

## 2024-11-22 PROCEDURE — 1090000002 HH PPS REVENUE DEBIT

## 2024-11-22 PROCEDURE — 1090000001 HH PPS REVENUE CREDIT

## 2024-11-23 PROCEDURE — 1090000001 HH PPS REVENUE CREDIT

## 2024-11-23 PROCEDURE — 1090000002 HH PPS REVENUE DEBIT

## 2024-11-24 VITALS
DIASTOLIC BLOOD PRESSURE: 78 MMHG | RESPIRATION RATE: 18 BRPM | HEIGHT: 63 IN | OXYGEN SATURATION: 99 % | WEIGHT: 293 LBS | HEART RATE: 79 BPM | SYSTOLIC BLOOD PRESSURE: 130 MMHG | BODY MASS INDEX: 51.91 KG/M2 | TEMPERATURE: 98 F

## 2024-11-24 PROCEDURE — 1090000002 HH PPS REVENUE DEBIT

## 2024-11-24 PROCEDURE — 1090000001 HH PPS REVENUE CREDIT

## 2024-11-25 ENCOUNTER — OFFICE VISIT (OUTPATIENT)
Dept: WOUND CARE | Facility: CLINIC | Age: 59
End: 2024-11-25
Payer: MEDICARE

## 2024-11-25 PROCEDURE — 1090000001 HH PPS REVENUE CREDIT

## 2024-11-25 PROCEDURE — 11042 DBRDMT SUBQ TIS 1ST 20SQCM/<: CPT

## 2024-11-25 PROCEDURE — 11042 DBRDMT SUBQ TIS 1ST 20SQCM/<: CPT | Performed by: NURSE PRACTITIONER

## 2024-11-25 PROCEDURE — 99213 OFFICE O/P EST LOW 20 MIN: CPT | Performed by: NURSE PRACTITIONER

## 2024-11-25 PROCEDURE — 1090000002 HH PPS REVENUE DEBIT

## 2024-11-26 ENCOUNTER — HOME CARE VISIT (OUTPATIENT)
Dept: HOME HEALTH SERVICES | Facility: HOME HEALTH | Age: 59
End: 2024-11-26
Payer: MEDICARE

## 2024-11-26 ENCOUNTER — APPOINTMENT (OUTPATIENT)
Dept: PHYSICAL THERAPY | Facility: HOSPITAL | Age: 59
End: 2024-11-26
Payer: MEDICARE

## 2024-11-26 ENCOUNTER — APPOINTMENT (OUTPATIENT)
Dept: PRIMARY CARE | Facility: CLINIC | Age: 59
End: 2024-11-26
Payer: MEDICARE

## 2024-11-26 VITALS
SYSTOLIC BLOOD PRESSURE: 160 MMHG | HEIGHT: 63 IN | HEART RATE: 87 BPM | BODY MASS INDEX: 70.86 KG/M2 | OXYGEN SATURATION: 95 % | DIASTOLIC BLOOD PRESSURE: 79 MMHG

## 2024-11-26 DIAGNOSIS — F41.9 ANXIETY: ICD-10-CM

## 2024-11-26 DIAGNOSIS — G62.9 NEUROPATHY: ICD-10-CM

## 2024-11-26 PROCEDURE — 1090000002 HH PPS REVENUE DEBIT

## 2024-11-26 PROCEDURE — 99496 TRANSJ CARE MGMT HIGH F2F 7D: CPT | Performed by: FAMILY MEDICINE

## 2024-11-26 PROCEDURE — 1090000001 HH PPS REVENUE CREDIT

## 2024-11-26 RX ORDER — BUSPIRONE HYDROCHLORIDE 5 MG/1
5 TABLET ORAL 2 TIMES DAILY
Qty: 180 TABLET | Refills: 1 | Status: SHIPPED | OUTPATIENT
Start: 2024-11-26 | End: 2025-05-25

## 2024-11-26 RX ORDER — GABAPENTIN 300 MG/1
CAPSULE ORAL
Qty: 360 CAPSULE | Refills: 1 | Status: SHIPPED | OUTPATIENT
Start: 2024-11-26

## 2024-11-26 ASSESSMENT — ENCOUNTER SYMPTOMS
WOUND: 1
CONSTITUTIONAL NEGATIVE: 1
RESPIRATORY NEGATIVE: 1

## 2024-11-26 NOTE — PROGRESS NOTES
Subjective   Patient ID: Patricia Mckeon is a 59 y.o. female who presents for Hospital Follow-up.  HPI    Review of Systems   Constitutional: Negative.    HENT: Negative.     Respiratory: Negative.     Cardiovascular:  Positive for leg swelling.   Genitourinary: Negative.    Skin:  Positive for wound.       Objective   Physical Exam  Constitutional:       Appearance: Normal appearance.   HENT:      Head: Normocephalic.      Mouth/Throat:      Mouth: Mucous membranes are moist.   Eyes:      Extraocular Movements: Extraocular movements intact.      Pupils: Pupils are equal, round, and reactive to light.   Cardiovascular:      Rate and Rhythm: Normal rate and regular rhythm.   Musculoskeletal:         General: Swelling and tenderness present.      Cervical back: Normal range of motion.      Right lower leg: Edema present.      Left lower leg: Edema present.   Skin:     Findings: Lesion present.   Neurological:      Mental Status: She is alert.         Assessment/Plan   Problem List Items Addressed This Visit    None  Visit Diagnoses         Codes    Neuropathy     G62.9    Relevant Medications    gabapentin (Neurontin) 300 mg capsule    Anxiety     F41.9    Relevant Medications    busPIRone (Buspar) 5 mg tablet                 Ramesh Rothman DO 11/26/24 11:45 AM

## 2024-11-27 ENCOUNTER — HOME CARE VISIT (OUTPATIENT)
Dept: HOME HEALTH SERVICES | Facility: HOME HEALTH | Age: 59
End: 2024-11-27
Payer: MEDICARE

## 2024-11-27 ENCOUNTER — APPOINTMENT (OUTPATIENT)
Dept: PHYSICAL THERAPY | Facility: HOSPITAL | Age: 59
End: 2024-11-27
Payer: MEDICARE

## 2024-11-27 PROCEDURE — 1090000001 HH PPS REVENUE CREDIT

## 2024-11-27 PROCEDURE — 1090000002 HH PPS REVENUE DEBIT

## 2024-11-28 PROCEDURE — 1090000002 HH PPS REVENUE DEBIT

## 2024-11-28 PROCEDURE — 1090000001 HH PPS REVENUE CREDIT

## 2024-11-29 ENCOUNTER — HOME CARE VISIT (OUTPATIENT)
Dept: HOME HEALTH SERVICES | Facility: HOME HEALTH | Age: 59
End: 2024-11-29
Payer: MEDICARE

## 2024-11-29 VITALS
HEART RATE: 90 BPM | DIASTOLIC BLOOD PRESSURE: 86 MMHG | SYSTOLIC BLOOD PRESSURE: 140 MMHG | TEMPERATURE: 97.5 F | RESPIRATION RATE: 18 BRPM

## 2024-11-29 PROCEDURE — 1090000001 HH PPS REVENUE CREDIT

## 2024-11-29 PROCEDURE — G0300 HHS/HOSPICE OF LPN EA 15 MIN: HCPCS | Mod: HHH

## 2024-11-29 PROCEDURE — 1090000002 HH PPS REVENUE DEBIT

## 2024-11-29 ASSESSMENT — ENCOUNTER SYMPTOMS
APPETITE LEVEL: GOOD
DENIES PAIN: 1
LAST BOWEL MOVEMENT: 67172
PERSON REPORTING PAIN: PATIENT
LOWER EXTREMITY EDEMA: 1

## 2024-11-29 ASSESSMENT — PAIN SCALES - PAIN ASSESSMENT IN ADVANCED DEMENTIA (PAINAD)
BODYLANGUAGE: 0
NEGVOCALIZATION: 0
CONSOLABILITY: 0 - NO NEED TO CONSOLE.
FACIALEXPRESSION: 0
BREATHING: 0
FACIALEXPRESSION: 0 - SMILING OR INEXPRESSIVE.
CONSOLABILITY: 0
BODYLANGUAGE: 0 - RELAXED.
NEGVOCALIZATION: 0 - NONE.
TOTALSCORE: 0

## 2024-11-30 PROCEDURE — 1090000002 HH PPS REVENUE DEBIT

## 2024-11-30 PROCEDURE — 1090000001 HH PPS REVENUE CREDIT

## 2024-12-02 ENCOUNTER — APPOINTMENT (OUTPATIENT)
Dept: WOUND CARE | Facility: CLINIC | Age: 59
End: 2024-12-02
Payer: MEDICARE

## 2024-12-03 ENCOUNTER — PATIENT OUTREACH (OUTPATIENT)
Dept: PRIMARY CARE | Facility: CLINIC | Age: 59
End: 2024-12-03
Payer: MEDICARE

## 2024-12-03 NOTE — PROGRESS NOTES
Call regarding appt. with PCP on  11/26/24 after hospitalization. At time of outreach call the patient feels as if their condition is slowly improving. She is planning to go to lymphedema clinic at [facility] on 12-16-24 to be able to get her lymphedema under control for wound to heal.   Reviewed the PCP appointment with the pt and addressed any questions or concerns.

## 2024-12-04 ENCOUNTER — HOME CARE VISIT (OUTPATIENT)
Dept: HOME HEALTH SERVICES | Facility: HOME HEALTH | Age: 59
End: 2024-12-04
Payer: MEDICARE

## 2024-12-04 VITALS
SYSTOLIC BLOOD PRESSURE: 132 MMHG | HEART RATE: 80 BPM | DIASTOLIC BLOOD PRESSURE: 88 MMHG | RESPIRATION RATE: 18 BRPM | TEMPERATURE: 97.8 F | OXYGEN SATURATION: 98 %

## 2024-12-04 PROCEDURE — G0151 HHCP-SERV OF PT,EA 15 MIN: HCPCS | Mod: HHH

## 2024-12-04 PROCEDURE — G0299 HHS/HOSPICE OF RN EA 15 MIN: HCPCS | Mod: HHH

## 2024-12-04 SDOH — HEALTH STABILITY: PHYSICAL HEALTH: EXERCISE TYPE: NOTHING IN PLACE

## 2024-12-04 SDOH — HEALTH STABILITY: PHYSICAL HEALTH: EXERCISE COMMENTS: FOOT PUMPS IN BED IS ALL SHE IS DOING BESIDES WALKING.

## 2024-12-04 ASSESSMENT — ENCOUNTER SYMPTOMS
OCCASIONAL FEELINGS OF UNSTEADINESS: 0
SUBJECTIVE PAIN PROGRESSION: GRADUALLY IMPROVING
PAIN LOCATION - EXACERBATING FACTORS: LYMPHEDEMA
LIMITED RANGE OF MOTION: 1
MUSCLE WEAKNESS: 1
PERSON REPORTING PAIN: PATIENT
PAIN LOCATION - PAIN FREQUENCY: CONSTANT
PAIN LOCATION - PAIN DURATION: -
PAIN LOCATION - PAIN QUALITY: ACHE
PAIN: 1
PAIN SEVERITY GOAL: 0/10
PAIN LOCATION - PAIN SEVERITY: 7/10
HIGHEST PAIN SEVERITY IN PAST 24 HOURS: 7/10
LOWEST PAIN SEVERITY IN PAST 24 HOURS: 6/10
PAIN LOCATION: LEFT LEG

## 2024-12-04 ASSESSMENT — ACTIVITIES OF DAILY LIVING (ADL)
AMBULATION ASSISTANCE ON FLAT SURFACES: 1
AMBULATION ASSISTANCE: STAND BY ASSIST
CURRENT_FUNCTION: STAND BY ASSIST
AMBULATION ASSISTANCE: 1
AMBULATION_DISTANCE/DURATION_TOLERATED: 35 FT
AMBULATION ASSISTANCE: SUPERVISION

## 2024-12-05 NOTE — HOME HEALTH
Got an infection, lymphedema .   Dec 16th outpt lymphedema clinic at [facility],  person had family emergency and vacation coming up.  recent stomach flu went thru household    next wk TUes-fri Mon is lymphedema clinic.  See Tues and THurs time doesnt matter

## 2024-12-06 ENCOUNTER — HOME CARE VISIT (OUTPATIENT)
Dept: HOME HEALTH SERVICES | Facility: HOME HEALTH | Age: 59
End: 2024-12-06
Payer: MEDICARE

## 2024-12-06 VITALS
RESPIRATION RATE: 18 BRPM | TEMPERATURE: 97.6 F | SYSTOLIC BLOOD PRESSURE: 122 MMHG | HEART RATE: 94 BPM | DIASTOLIC BLOOD PRESSURE: 79 MMHG | OXYGEN SATURATION: 96 %

## 2024-12-06 PROCEDURE — G0300 HHS/HOSPICE OF LPN EA 15 MIN: HCPCS | Mod: HHH

## 2024-12-06 ASSESSMENT — ENCOUNTER SYMPTOMS
PAIN LOCATION - PAIN QUALITY: ACHE
PERSON REPORTING PAIN: PATIENT
PAIN SEVERITY GOAL: 0/10
PAIN LOCATION - PAIN FREQUENCY: FREQUENT
PAIN: 1
CHANGE IN APPETITE: UNCHANGED
SUBJECTIVE PAIN PROGRESSION: WAXING AND WANING
APPETITE LEVEL: GOOD
HIGHEST PAIN SEVERITY IN PAST 24 HOURS: 10/10
PAIN LOCATION - PAIN SEVERITY: 7/10
LOWER EXTREMITY EDEMA: 1
LAST BOWEL MOVEMENT: 67180
PAIN LOCATION: LEFT FOOT
LOWEST PAIN SEVERITY IN PAST 24 HOURS: 6/10

## 2024-12-06 ASSESSMENT — PAIN SCALES - PAIN ASSESSMENT IN ADVANCED DEMENTIA (PAINAD)
BREATHING: 0
CONSOLABILITY: 0 - NO NEED TO CONSOLE.
CONSOLABILITY: 0
NEGVOCALIZATION: 0 - NONE.
BODYLANGUAGE: 0 - RELAXED.
FACIALEXPRESSION: 0 - SMILING OR INEXPRESSIVE.
TOTALSCORE: 0
NEGVOCALIZATION: 0
BODYLANGUAGE: 0
FACIALEXPRESSION: 0

## 2024-12-08 ASSESSMENT — ENCOUNTER SYMPTOMS
APPETITE LEVEL: GOOD
DENIES PAIN: 1
MUSCLE WEAKNESS: 1
CHANGE IN APPETITE: UNCHANGED

## 2024-12-10 ENCOUNTER — HOME CARE VISIT (OUTPATIENT)
Dept: HOME HEALTH SERVICES | Facility: HOME HEALTH | Age: 59
End: 2024-12-10
Payer: MEDICARE

## 2024-12-11 ENCOUNTER — HOME CARE VISIT (OUTPATIENT)
Dept: HOME HEALTH SERVICES | Facility: HOME HEALTH | Age: 59
End: 2024-12-11
Payer: MEDICARE

## 2024-12-11 VITALS
SYSTOLIC BLOOD PRESSURE: 130 MMHG | OXYGEN SATURATION: 96 % | DIASTOLIC BLOOD PRESSURE: 86 MMHG | TEMPERATURE: 97.6 F | RESPIRATION RATE: 18 BRPM | HEART RATE: 93 BPM

## 2024-12-11 PROCEDURE — G0300 HHS/HOSPICE OF LPN EA 15 MIN: HCPCS | Mod: HHH

## 2024-12-11 ASSESSMENT — ENCOUNTER SYMPTOMS
PAIN LOCATION - PAIN QUALITY: ACHING
LAST BOWEL MOVEMENT: 67185
CHANGE IN APPETITE: UNCHANGED
APPETITE LEVEL: GOOD
PAIN SEVERITY GOAL: 0/10
LOWER EXTREMITY EDEMA: 1
PAIN LOCATION: LEFT FOOT
PAIN: 1
PERSON REPORTING PAIN: PATIENT
HIGHEST PAIN SEVERITY IN PAST 24 HOURS: 8/10
LOWEST PAIN SEVERITY IN PAST 24 HOURS: 6/10
SUBJECTIVE PAIN PROGRESSION: UNCHANGED
PAIN LOCATION - PAIN SEVERITY: 6/10
PAIN LOCATION - PAIN FREQUENCY: FREQUENT

## 2024-12-12 ENCOUNTER — HOME CARE VISIT (OUTPATIENT)
Dept: HOME HEALTH SERVICES | Facility: HOME HEALTH | Age: 59
End: 2024-12-12
Payer: MEDICARE

## 2024-12-13 ENCOUNTER — HOME CARE VISIT (OUTPATIENT)
Dept: HOME HEALTH SERVICES | Facility: HOME HEALTH | Age: 59
End: 2024-12-13
Payer: MEDICARE

## 2024-12-13 PROCEDURE — G0299 HHS/HOSPICE OF RN EA 15 MIN: HCPCS | Mod: HHH

## 2024-12-14 ASSESSMENT — ENCOUNTER SYMPTOMS
DENIES PAIN: 1
MUSCLE WEAKNESS: 1
CHANGE IN APPETITE: UNCHANGED
APPETITE LEVEL: GOOD

## 2024-12-16 ENCOUNTER — APPOINTMENT (OUTPATIENT)
Dept: WOUND CARE | Facility: CLINIC | Age: 59
End: 2024-12-16
Payer: MEDICARE

## 2024-12-17 ENCOUNTER — HOME CARE VISIT (OUTPATIENT)
Dept: HOME HEALTH SERVICES | Facility: HOME HEALTH | Age: 59
End: 2024-12-17
Payer: MEDICARE

## 2024-12-18 ENCOUNTER — HOME CARE VISIT (OUTPATIENT)
Dept: HOME HEALTH SERVICES | Facility: HOME HEALTH | Age: 59
End: 2024-12-18
Payer: MEDICARE

## 2024-12-19 ENCOUNTER — PATIENT OUTREACH (OUTPATIENT)
Dept: PRIMARY CARE | Facility: CLINIC | Age: 59
End: 2024-12-19
Payer: MEDICARE

## 2024-12-21 ENCOUNTER — HOME CARE VISIT (OUTPATIENT)
Dept: HOME HEALTH SERVICES | Facility: HOME HEALTH | Age: 59
End: 2024-12-21
Payer: MEDICARE

## 2024-12-24 ENCOUNTER — HOME CARE VISIT (OUTPATIENT)
Dept: HOME HEALTH SERVICES | Facility: HOME HEALTH | Age: 59
End: 2024-12-24
Payer: MEDICARE

## 2024-12-26 ENCOUNTER — HOME CARE VISIT (OUTPATIENT)
Dept: HOME HEALTH SERVICES | Facility: HOME HEALTH | Age: 59
End: 2024-12-26
Payer: MEDICARE

## 2024-12-30 ENCOUNTER — OFFICE VISIT (OUTPATIENT)
Dept: WOUND CARE | Facility: CLINIC | Age: 59
End: 2024-12-30
Payer: MEDICARE

## 2024-12-30 DIAGNOSIS — L03.116 CELLULITIS OF LEFT LEG: Primary | ICD-10-CM

## 2024-12-30 PROCEDURE — 87077 CULTURE AEROBIC IDENTIFY: CPT | Performed by: NURSE PRACTITIONER

## 2024-12-30 PROCEDURE — 11042 DBRDMT SUBQ TIS 1ST 20SQCM/<: CPT

## 2024-12-30 PROCEDURE — 11045 DBRDMT SUBQ TISS EACH ADDL: CPT

## 2024-12-30 RX ORDER — CIPROFLOXACIN 500 MG/1
500 TABLET ORAL 2 TIMES DAILY
Qty: 14 TABLET | Refills: 0 | Status: SHIPPED | OUTPATIENT
Start: 2024-12-30 | End: 2025-01-06

## 2024-12-30 RX ORDER — NYSTATIN 100000 [USP'U]/G
1 POWDER TOPICAL 2 TIMES DAILY
Qty: 30 G | Refills: 3 | Status: SHIPPED | OUTPATIENT
Start: 2024-12-30 | End: 2025-12-30

## 2024-12-30 ASSESSMENT — ENCOUNTER SYMPTOMS
ARTHRALGIAS: 1
PAIN LOCATION: RIGHT KNEE
PAIN: 1
MUSCLE WEAKNESS: 1
PERSON REPORTING PAIN: PATIENT
PAIN LOCATION: LEFT KNEE

## 2024-12-30 ASSESSMENT — ACTIVITIES OF DAILY LIVING (ADL)
HOME_HEALTH_OASIS: 00
PHYSICAL TRANSFERS ASSESSED: 1
CURRENT_FUNCTION: INDEPENDENT
AMBULATION ASSISTANCE: SUPERVISION
OASIS_M1830: 02
AMBULATION ASSISTANCE ON FLAT SURFACES: 1
AMBULATION ASSISTANCE: 1
AMBULATION_DISTANCE/DURATION_TOLERATED: HOUSEHOLD DISTANCES

## 2024-12-31 NOTE — HOME HEALTH
Pt was seen for eval and recommendations how to increase activity.   She missed many visits and acknowledged that she cannot do PT at home while getting lymphedema outpt [facility].

## 2025-01-01 SDOH — HEALTH STABILITY: PHYSICAL HEALTH: EXERCISE TYPE: HAS HEP

## 2025-01-01 ASSESSMENT — ACTIVITIES OF DAILY LIVING (ADL)
PHYSICAL TRANSFERS ASSESSED: 1
AMBULATION ASSISTANCE: 1
CURRENT_FUNCTION: INDEPENDENT
AMBULATION ASSISTANCE ON FLAT SURFACES: 1
AMBULATION ASSISTANCE: INDEPENDENT

## 2025-01-01 ASSESSMENT — ENCOUNTER SYMPTOMS
PERSON REPORTING PAIN: PATIENT
DENIES PAIN: 1

## 2025-01-03 LAB
B-LACTAMASE ORGANISM ISLT: NEGATIVE
BACTERIA SPEC CULT: ABNORMAL
BACTERIA SPEC CULT: ABNORMAL
GRAM STN SPEC: ABNORMAL
GRAM STN SPEC: ABNORMAL

## 2025-01-03 RX ORDER — DOXYCYCLINE 100 MG/1
100 CAPSULE ORAL 2 TIMES DAILY
Qty: 14 CAPSULE | Refills: 0 | Status: SHIPPED | OUTPATIENT
Start: 2025-01-03 | End: 2025-01-10

## 2025-01-06 ENCOUNTER — APPOINTMENT (OUTPATIENT)
Dept: WOUND CARE | Facility: CLINIC | Age: 60
End: 2025-01-06
Payer: MEDICARE

## 2025-01-08 ENCOUNTER — APPOINTMENT (OUTPATIENT)
Dept: WOUND CARE | Facility: CLINIC | Age: 60
End: 2025-01-08
Payer: MEDICARE

## 2025-01-13 ENCOUNTER — OFFICE VISIT (OUTPATIENT)
Dept: WOUND CARE | Facility: CLINIC | Age: 60
End: 2025-01-13
Payer: MEDICARE

## 2025-01-13 ENCOUNTER — APPOINTMENT (OUTPATIENT)
Dept: WOUND CARE | Facility: CLINIC | Age: 60
End: 2025-01-13
Payer: MEDICARE

## 2025-01-13 PROCEDURE — 11042 DBRDMT SUBQ TIS 1ST 20SQCM/<: CPT

## 2025-01-13 PROCEDURE — 11045 DBRDMT SUBQ TISS EACH ADDL: CPT

## 2025-01-20 ENCOUNTER — APPOINTMENT (OUTPATIENT)
Dept: WOUND CARE | Facility: CLINIC | Age: 60
End: 2025-01-20
Payer: MEDICARE

## 2025-01-27 ENCOUNTER — OFFICE VISIT (OUTPATIENT)
Dept: WOUND CARE | Facility: CLINIC | Age: 60
End: 2025-01-27
Payer: MEDICARE

## 2025-01-27 DIAGNOSIS — L03.116 LEFT LEG CELLULITIS: Primary | ICD-10-CM

## 2025-01-27 PROCEDURE — 11045 DBRDMT SUBQ TISS EACH ADDL: CPT

## 2025-01-27 PROCEDURE — 11042 DBRDMT SUBQ TIS 1ST 20SQCM/<: CPT

## 2025-01-27 RX ORDER — DOXYCYCLINE 100 MG/1
100 CAPSULE ORAL 2 TIMES DAILY
Qty: 20 CAPSULE | Refills: 0 | Status: SHIPPED | OUTPATIENT
Start: 2025-01-27 | End: 2025-02-06

## 2025-02-03 ENCOUNTER — OFFICE VISIT (OUTPATIENT)
Dept: WOUND CARE | Facility: CLINIC | Age: 60
End: 2025-02-03
Payer: MEDICARE

## 2025-02-03 PROCEDURE — 11042 DBRDMT SUBQ TIS 1ST 20SQCM/<: CPT

## 2025-02-10 ENCOUNTER — OFFICE VISIT (OUTPATIENT)
Dept: WOUND CARE | Facility: CLINIC | Age: 60
End: 2025-02-10
Payer: MEDICARE

## 2025-02-10 DIAGNOSIS — L97.828: ICD-10-CM

## 2025-02-10 DIAGNOSIS — L97.922: Primary | ICD-10-CM

## 2025-02-10 DIAGNOSIS — E66.01 MORBID OBESITY (MULTI): ICD-10-CM

## 2025-02-10 PROCEDURE — 11042 DBRDMT SUBQ TIS 1ST 20SQCM/<: CPT

## 2025-02-10 PROCEDURE — 11045 DBRDMT SUBQ TISS EACH ADDL: CPT

## 2025-02-11 ENCOUNTER — PATIENT OUTREACH (OUTPATIENT)
Dept: PRIMARY CARE | Facility: CLINIC | Age: 60
End: 2025-02-11
Payer: MEDICARE

## 2025-02-11 LAB
EST. AVERAGE GLUCOSE BLD GHB EST-MCNC: 126 MG/DL
EST. AVERAGE GLUCOSE BLD GHB EST-SCNC: 7 MMOL/L
HBA1C MFR BLD: 6 % OF TOTAL HGB

## 2025-02-11 NOTE — PROGRESS NOTES
Physical Therapy Evaluation and Treatment      Patient Name:Patricia Mckeon   MRN:[id number]   Today's Date:10/10/2024   Referred by: Josy Ray   Time Calculation  Start Time: 100  Stop Time: 210  Time Calculation (min): 70 min    PT Evaluation Time Entry  PT Evaluation (Complex) Time Entry: 45  PT Therapeutic Procedures Time Entry  Manual Therapy Time Entry: 15  Self-Care/Home Mgmt Training: 10       Insurance  Visit number: 1   Approved number of visits: MN  Auth required: No  Authorization date range:   Onset Date: 2024  Medicare Certification Period:  Beginning:     10/10/2024            Endin25  Payor: [employer] / Plan: [employer] MEDICARE ASSURE / Product Type: *No Product type* /     Assessment:   58 yo female referred to lymphedema clinic by wound clinic, pt displays significant B LE lymphedema with medial thigh lobule, multiple lobule and skin folds L>R lower leg, open wound under lobes between skin folds with drainage, hemosiderin staining, dorsal foot hump, stage 3 lymphedema, complicated by CVI, wound, obesity with BMI 69, pt is currently undergoing wound care 1xweek and self dressing change daily, pt recently received lymphedema pump. PT will work on CDT focus on compression, then MLD and remedial exercises to help with sx management, expect very long course of lymphedema treatment due to extent of her lymphedema with multiple comorbidity condition.    Will need to see if can request compression bandages via insurance, will need day time and night time compression garment once improved.       Rehab Potential: fair  Clinical Presentation: Unstable and unpredictable characteristics   Evaluation Complexity: high      Plan:   Visit number: 1   PT Plan: Skilled PT  PT Frequency: 3 times per week  Duration: 5 months  Onset Date: 24  Certification Period Start Date: 10/10/24  Certification Period End Date: 25  Number of Treatments Authorized: SHABBIR Alegre  Rehab Potential:  0630:Pt pulled back to [facility] in [facility] [place]. Pre- Procedure prep in progress.    0746:Pre procedure prep complete. Awaiting procedure.  Redness noted to bilat groin area. Cath Lab notified.   0931: Bedside handoff/ Pre procedure timeout performed with Cath Lab RN Nery. Patient left to Cath Lab for scheduled procedure. Chart sent with patient. [facility].    1048:Patient back from cath lab. Wrist puncture site CDI. Vascband in place. Will continue to monitor.    1315: Vascband removed. Dressing applied to right wrist.   1457:Ambulated around unit without difficulty. Returned to room with no complaints of pain or distress noted. VSS. Dressing to puncture site remains CDI and unchanged from previous assessment. Proceed with discharge as ordered.   1518:.Patient verbalizes understanding of discharge instructions, angio site care, and follow up care. Belongings gathered and dressed in personal clothing. VSS/NADN at time of discharge. Wheeled to private vehicle with designated ride home.       Fair  Plan of Care Agreement: Patient    Planned interventions include:   Request compression bandaging, start CDT treatment B LE, remedial exercises as able, continue with effort of elevation and lymphedema pump when able (when not bandaged), continue weekly woundcare.    When improved, request day and night time compression garment.     Plan of care was developed with input and agreement by the patient.     Precautions/Fall Risk:  Fall Risk: Moderate based on professional judgment  Pacemaker: no    Seizures: No    Post Op Movement/Restrictions: No  Weight Bearing Status: As tolerated  Other Medical precautions: latex allergy, circulation issues with wound and lymphedema, stomach tumor removal, sepsis [id number] with tissue removal     Therapy Diagnosis:  Problem List Items Addressed This Visit             ICD-10-CM       Cardiac and Vasculature    Venous insufficiency I87.2    Relevant Orders    Follow Up In Physical Therapy       Endocrine/Metabolic    Obesity, unspecified E66.9    Relevant Orders    Follow Up In Physical Therapy       Musculoskeletal and Injuries    Chronic ulcer of left calf limited to breakdown of skin L97.221    Relevant Orders    Follow Up In Physical Therapy       Symptoms and Signs    Lymphedema of both lower extremities - Primary I89.0    Relevant Orders    Follow Up In Physical Therapy       Current Problem:   1. Lymphedema of both lower extremities  Referral to Physical Therapy    Follow Up In Physical Therapy      2. Chronic ulcer of left calf limited to breakdown of skin  Follow Up In Physical Therapy      3. Venous insufficiency  Follow Up In Physical Therapy      4. Obesity, unspecified  Follow Up In Physical Therapy                Subjective:  General:  General  Reason for Referral: lymphedema B LE, wound L LE  Referred By: Josy Ray CNP  Past Medical History Relevant to Rehab: circulation problems, latex allergy  Preferred Learning Style: verbal, visual,  written  Precautions:  Precautions  STEADI Fall Risk Score (The score of 4 or more indicates an increased risk of falling): 7  Precautions Comment: circulation problems, latex allergy    HPI: pt states a few years onset of fluid leaking out of lower legs since 2017, sx gradually getting worse, has had sepsis 2019 -2020 with hospitalization and tissue removal, currently has wound L LE, under wound care 1x/week for the past year or two. Pt is referred to lymphedema clinic by wound care.  No previous lymphedema treatment.   Pt states body weight holding steady or slightly gaining.  Pt is sleeping in bed with legs propped up under mattress for the past year.    Pt received lymphapress x2 months, trying 1-2x/[id number], painful but feels it helps.      PMH: latex allergy, circulation issues with wound and lymphedema, stomach tumor removal, sepsis 5823-4553 with tissue removal     MEDICATIONS:  lasix 40mg just started, taking gabapentin, tylenol for pain.      SOCIAL HX/JOB STATUS: lives alone, 2 steps to enter, using rolling walker, independent with self care with some difficulty getting in and out of tub, children help intermittently.      BASELINE FUNCTION:   no regular exercises.     Pain:  Location of Pain: L>R LE as ache, burning, throbbing.    Current Pain Level (0-10): 8   High Pain Level (0-10): 8   Low Pain Level (0-10): 8  Pain Exacerbating Factors:  unclear.    Pain Relieving Factors: pain meds, unclear. Maybe resting and sleeping.      Patient Awareness: Patient is aware of  her diagnosis and prognosis.       Objective   LE circumference measure R/L:   Not measured due to significant irregular shape, lobules B medial thigh, significant lobule L LE with wound under lobule.  With dressing material (hydrocera blue )under lobule.    Date/R/L        Metatarsal        Arch         Malleoli        ankle        10cm above ankle        20cm above ankle        calf        Tibial tuberosity         knee        10cm above  knee        20cm above knee        30cm above knee        Gluteal fold        Length (knee/thigh)              Functional mobility: able to sit to stand with use of hand, difficulty lifting Legs, transfer into bed from prone position per pt.        Skin appearance/condition:   significant irregular shape, lobules B medial thigh, significant lobule L LE with wound under lobule.  With dressing material (hydrocera blue(?) )under lobule.  L dorsal foot hump and diminished toe definition.   +fibrosis and skin folds, hyperkeratosis, painful with palpation, pitting on lobule.  +hemosiderin staining.   Self dressing change daily, with weeping noted, woundcare 1x/week with debridement then new wound dressing.    LLIS 71%    Outcome Measures:  Other Measures  Lymphedema Life Impact Scale (LLIS): 71     Treatments:     Treatment Performed Today: Treatment provided today:     Educated patient on lymphedema etiology, treatment rationale and expectations. All questions answered.  Educated on decongestive exercises with ankle pumps as able, importance of elevation with sitting and sleeping.  Reapplied dressing with abd pad and gauze to fill wound between lobes secured with kerlix, provided new size G tubigrip R, size K tubigrip L.      Response to Treatment: improved knowledge and understanding of condition    Education/Resources provided today: Home Program   [employer]:    OP EDUCATION:       Goals:  Patient's Goal for Treatment: Reduce symptoms  To be met at time of discharge:  --Decrease girth measurement of LE by 5cm or until plateau.  -- Improve skin/tissue: facilitate wound closing, minimize B LE lobule and skin folds.    -- Knowledgeable and compliant with home program, including lymphedema management and exercises.   -- Improve functional outcome on LLIS to <40%.  -- able to tolerate a conditioning program 10min a day or longer for long term management.

## 2025-02-16 LAB
BACTERIA SPEC AEROBE CULT: ABNORMAL
BACTERIA SPEC ANAEROBE CULT: ABNORMAL

## 2025-02-17 ENCOUNTER — APPOINTMENT (OUTPATIENT)
Dept: WOUND CARE | Facility: CLINIC | Age: 60
End: 2025-02-17
Payer: MEDICARE

## 2025-02-24 ENCOUNTER — OFFICE VISIT (OUTPATIENT)
Dept: WOUND CARE | Facility: CLINIC | Age: 60
End: 2025-02-24
Payer: MEDICARE

## 2025-02-24 PROCEDURE — 11042 DBRDMT SUBQ TIS 1ST 20SQCM/<: CPT

## 2025-02-24 PROCEDURE — 11045 DBRDMT SUBQ TISS EACH ADDL: CPT

## 2025-03-03 ENCOUNTER — APPOINTMENT (OUTPATIENT)
Dept: WOUND CARE | Facility: CLINIC | Age: 60
End: 2025-03-03
Payer: MEDICARE

## 2025-03-10 ENCOUNTER — OFFICE VISIT (OUTPATIENT)
Dept: WOUND CARE | Facility: CLINIC | Age: 60
End: 2025-03-10
Payer: MEDICARE

## 2025-03-10 DIAGNOSIS — L97.222 NON-PRESSURE CHRONIC ULCER OF LEFT CALF WITH FAT LAYER EXPOSED (MULTI): ICD-10-CM

## 2025-03-10 PROCEDURE — 11045 DBRDMT SUBQ TISS EACH ADDL: CPT

## 2025-03-10 PROCEDURE — 11042 DBRDMT SUBQ TIS 1ST 20SQCM/<: CPT

## 2025-03-13 RX ORDER — CIPROFLOXACIN 500 MG/1
500 TABLET ORAL 2 TIMES DAILY
Qty: 20 TABLET | Refills: 0 | Status: SHIPPED | OUTPATIENT
Start: 2025-03-13 | End: 2025-03-23

## 2025-03-16 LAB
BACTERIA SPEC AEROBE CULT: ABNORMAL
BACTERIA SPEC ANAEROBE CULT: ABNORMAL

## 2025-03-17 ENCOUNTER — OFFICE VISIT (OUTPATIENT)
Dept: WOUND CARE | Facility: CLINIC | Age: 60
End: 2025-03-17
Payer: MEDICARE

## 2025-03-17 PROCEDURE — 11045 DBRDMT SUBQ TISS EACH ADDL: CPT

## 2025-03-17 PROCEDURE — 11042 DBRDMT SUBQ TIS 1ST 20SQCM/<: CPT

## 2025-03-24 ENCOUNTER — APPOINTMENT (OUTPATIENT)
Dept: WOUND CARE | Facility: CLINIC | Age: 60
End: 2025-03-24
Payer: MEDICARE

## 2025-03-31 ENCOUNTER — OFFICE VISIT (OUTPATIENT)
Dept: WOUND CARE | Facility: CLINIC | Age: 60
End: 2025-03-31
Payer: MEDICARE

## 2025-03-31 PROCEDURE — 97598 DBRDMT OPN WND ADDL 20CM/<: CPT

## 2025-03-31 PROCEDURE — 97597 DBRDMT OPN WND 1ST 20 CM/<: CPT

## 2025-04-07 ENCOUNTER — APPOINTMENT (OUTPATIENT)
Dept: WOUND CARE | Facility: CLINIC | Age: 60
End: 2025-04-07
Payer: MEDICARE

## 2025-08-11 ENCOUNTER — PATIENT OUTREACH (OUTPATIENT)
Dept: PRIMARY CARE | Facility: CLINIC | Age: 60
End: 2025-08-11
Payer: MEDICARE

## 2025-08-13 ENCOUNTER — TELEPHONE (OUTPATIENT)
Dept: PRIMARY CARE | Facility: CLINIC | Age: 60
End: 2025-08-13
Payer: MEDICARE

## 2025-08-26 ENCOUNTER — PATIENT OUTREACH (OUTPATIENT)
Dept: PRIMARY CARE | Facility: CLINIC | Age: 60
End: 2025-08-26
Payer: MEDICARE